# Patient Record
Sex: MALE | Race: WHITE | NOT HISPANIC OR LATINO | Employment: FULL TIME | URBAN - METROPOLITAN AREA
[De-identification: names, ages, dates, MRNs, and addresses within clinical notes are randomized per-mention and may not be internally consistent; named-entity substitution may affect disease eponyms.]

---

## 2017-01-17 ENCOUNTER — HOSPITAL ENCOUNTER (OUTPATIENT)
Dept: CARDIOLOGY | Facility: HOSPITAL | Age: 57
Discharge: HOME | End: 2017-01-17
Attending: INTERNAL MEDICINE | Admitting: INTERNAL MEDICINE

## 2017-05-16 ENCOUNTER — HOSPITAL ENCOUNTER (OUTPATIENT)
Dept: OTHER | Facility: HOSPITAL | Age: 57
Setting detail: SPECIMEN
Discharge: HOME | End: 2017-05-16
Attending: FAMILY MEDICINE | Admitting: FAMILY MEDICINE

## 2017-05-16 LAB
PSA SERPL-MCNC: 1.23 NG/ML (ref 0–4)
TESTOST SERPL-MCNC: 1.02 NG/ML (ref 1.7–7.8)

## 2018-06-06 ENCOUNTER — HOSPITAL ENCOUNTER (OUTPATIENT)
Dept: OTHER | Facility: HOSPITAL | Age: 58
Discharge: HOME | End: 2018-06-06
Attending: FAMILY MEDICINE | Admitting: FAMILY MEDICINE

## 2018-08-07 ENCOUNTER — HOSPITAL ENCOUNTER (OUTPATIENT)
Dept: OTHER | Facility: HOSPITAL | Age: 58
Setting detail: SPECIMEN
Discharge: HOME | End: 2018-08-07
Attending: FAMILY MEDICINE | Admitting: FAMILY MEDICINE

## 2018-08-07 LAB
ALBUMIN SERPL-MCNC: 4.1 G/DL (ref 3.5–4.8)
ALBUMIN/GLOB SERPL: 1.6 {RATIO} (ref 1–1.7)
ALP SERPL-CCNC: 53 IU/L (ref 32–91)
ALT SERPL-CCNC: 21 IU/L (ref 17–63)
ANION GAP SERPL CALC-SCNC: 11.1 MMOL/L (ref 10–20)
AST SERPL-CCNC: 24 IU/L (ref 15–41)
BASOPHILS # BLD AUTO: 0.1 10*3/UL (ref 0–0.2)
BASOPHILS NFR BLD AUTO: 1 % (ref 0–2)
BILIRUB SERPL-MCNC: 0.7 MG/DL (ref 0.3–1.2)
BUN SERPL-MCNC: 18 MG/DL (ref 8–20)
BUN/CREAT SERPL: 18 (ref 6.2–20.3)
CALCIUM SERPL-MCNC: 9.1 MG/DL (ref 8.9–10.3)
CHLORIDE SERPL-SCNC: 108 MMOL/L (ref 101–111)
CONV CO2: 25 MMOL/L (ref 22–32)
CONV TOTAL PROTEIN: 6.7 G/DL (ref 6.1–7.9)
CREAT UR-MCNC: 1 MG/DL (ref 0.7–1.2)
DIFFERENTIAL METHOD BLD: (no result)
EOSINOPHIL # BLD AUTO: 0.1 10*3/UL (ref 0–0.3)
EOSINOPHIL # BLD AUTO: 1 % (ref 0–3)
ERYTHROCYTE [DISTWIDTH] IN BLOOD BY AUTOMATED COUNT: 13.3 % (ref 11.5–14.5)
GLOBULIN UR ELPH-MCNC: 2.6 G/DL (ref 2.5–3.8)
GLUCOSE SERPL-MCNC: 89 MG/DL (ref 65–99)
HCT VFR BLD AUTO: 41.5 % (ref 40–54)
HGB BLD-MCNC: 14.4 G/DL (ref 14–18)
LYMPHOCYTES # BLD AUTO: 2 10*3/UL (ref 0.8–4.8)
LYMPHOCYTES NFR BLD AUTO: 35 % (ref 18–42)
MCH RBC QN AUTO: 30.8 PG (ref 26–32)
MCHC RBC AUTO-ENTMCNC: 34.6 G/DL (ref 32–36)
MCV RBC AUTO: 88.9 FL (ref 80–94)
MONOCYTES # BLD AUTO: 0.4 10*3/UL (ref 0.1–1.3)
MONOCYTES NFR BLD AUTO: 7 % (ref 2–11)
NEUTROPHILS # BLD AUTO: 3.2 10*3/UL (ref 2.3–8.6)
NEUTROPHILS NFR BLD AUTO: 56 % (ref 50–75)
NRBC BLD AUTO-RTO: 0 /100{WBCS}
NRBC/RBC NFR BLD MANUAL: 0 10*3/UL
PLATELET # BLD AUTO: 241 10*3/UL (ref 150–450)
PMV BLD AUTO: 8.4 FL (ref 7.4–10.4)
POTASSIUM SERPL-SCNC: 4.1 MMOL/L (ref 3.6–5.1)
RBC # BLD AUTO: 4.67 10*6/UL (ref 4.6–6)
SODIUM SERPL-SCNC: 140 MMOL/L (ref 136–144)
WBC # BLD AUTO: 5.7 10*3/UL (ref 4.5–11.5)

## 2018-08-31 ENCOUNTER — HOSPITAL ENCOUNTER (OUTPATIENT)
Dept: OTHER | Facility: HOSPITAL | Age: 58
Discharge: HOME | End: 2018-08-31
Attending: FAMILY MEDICINE | Admitting: FAMILY MEDICINE

## 2019-01-09 ENCOUNTER — HOSPITAL ENCOUNTER (OUTPATIENT)
Dept: OTHER | Facility: HOSPITAL | Age: 59
Setting detail: SPECIMEN
Discharge: HOME | End: 2019-01-09
Attending: FAMILY MEDICINE | Admitting: FAMILY MEDICINE

## 2019-01-09 ENCOUNTER — HOSPITAL ENCOUNTER (OUTPATIENT)
Dept: LAB | Facility: HOSPITAL | Age: 59
Discharge: HOME | End: 2019-01-09
Attending: FAMILY MEDICINE | Admitting: FAMILY MEDICINE

## 2019-01-09 LAB
ALBUMIN SERPL-MCNC: 3.9 G/DL (ref 3.5–4.8)
ALBUMIN/GLOB SERPL: 1.6 {RATIO} (ref 1–1.7)
ALP SERPL-CCNC: 63 IU/L (ref 32–91)
ALT SERPL-CCNC: 23 IU/L (ref 17–63)
ANION GAP SERPL CALC-SCNC: 13.9 MMOL/L (ref 10–20)
AST SERPL-CCNC: 28 IU/L (ref 15–41)
BILIRUB SERPL-MCNC: 0.3 MG/DL (ref 0.3–1.2)
BUN SERPL-MCNC: 14 MG/DL (ref 8–20)
BUN/CREAT SERPL: 14 (ref 6.2–20.3)
CALCIUM SERPL-MCNC: 9 MG/DL (ref 8.9–10.3)
CHLORIDE SERPL-SCNC: 102 MMOL/L (ref 101–111)
CONV CO2: 23 MMOL/L (ref 22–32)
CONV TOTAL PROTEIN: 6.4 G/DL (ref 6.1–7.9)
CREAT UR-MCNC: 1 MG/DL (ref 0.7–1.2)
GLOBULIN UR ELPH-MCNC: 2.5 G/DL (ref 2.5–3.8)
GLUCOSE SERPL-MCNC: 121 MG/DL (ref 65–99)
POTASSIUM SERPL-SCNC: 3.9 MMOL/L (ref 3.6–5.1)
SODIUM SERPL-SCNC: 135 MMOL/L (ref 136–144)
URATE SERPL-MCNC: 6.9 MG/DL (ref 4.8–8.7)

## 2019-01-11 LAB
ANA SER QL IA: NORMAL
CHROMATIN AB SERPL-ACNC: <20 [IU]/ML (ref 0–20)

## 2019-01-15 ENCOUNTER — HOSPITAL ENCOUNTER (OUTPATIENT)
Dept: LAB | Facility: HOSPITAL | Age: 59
Discharge: HOME | End: 2019-01-15
Attending: NURSE PRACTITIONER | Admitting: NURSE PRACTITIONER

## 2019-01-15 LAB
Lab: 24
Lab: NORMAL
Lab: NORMAL
SPECIMEN SOURCE: NORMAL
URINE VOLUME: 1300 ML

## 2019-02-18 ENCOUNTER — HOSPITAL ENCOUNTER (OUTPATIENT)
Dept: OTHER | Facility: HOSPITAL | Age: 59
Setting detail: SPECIMEN
Discharge: HOME | End: 2019-02-18
Attending: FAMILY MEDICINE | Admitting: FAMILY MEDICINE

## 2019-07-03 ENCOUNTER — OFFICE VISIT (OUTPATIENT)
Dept: FAMILY MEDICINE CLINIC | Facility: CLINIC | Age: 59
End: 2019-07-03

## 2019-07-03 VITALS
HEIGHT: 72 IN | BODY MASS INDEX: 26.33 KG/M2 | HEART RATE: 67 BPM | OXYGEN SATURATION: 97 % | SYSTOLIC BLOOD PRESSURE: 160 MMHG | DIASTOLIC BLOOD PRESSURE: 88 MMHG | WEIGHT: 194.4 LBS

## 2019-07-03 DIAGNOSIS — L82.1 SEBORRHEIC KERATOSIS: Primary | ICD-10-CM

## 2019-07-03 DIAGNOSIS — L82.1 SEBORRHEIC KERATOSES: ICD-10-CM

## 2019-07-03 PROCEDURE — 11200 RMVL SKIN TAGS UP TO&INC 15: CPT | Performed by: FAMILY MEDICINE

## 2019-07-03 PROCEDURE — 99211 OFF/OP EST MAY X REQ PHY/QHP: CPT | Performed by: FAMILY MEDICINE

## 2019-07-03 RX ORDER — TESTOSTERONE CYPIONATE 200 MG/ML
INJECTION, SOLUTION INTRAMUSCULAR
COMMUNITY
Start: 2016-12-27 | End: 2019-08-08 | Stop reason: SDUPTHER

## 2019-07-03 RX ORDER — VERAPAMIL HYDROCHLORIDE 240 MG/1
CAPSULE, EXTENDED RELEASE ORAL
COMMUNITY
Start: 2018-04-30 | End: 2019-08-08 | Stop reason: SDUPTHER

## 2019-07-03 RX ORDER — LISINOPRIL 20 MG/1
TABLET ORAL EVERY 12 HOURS
COMMUNITY
Start: 2018-03-27 | End: 2019-08-08 | Stop reason: SDUPTHER

## 2019-07-04 PROBLEM — E03.9 HYPOTHYROIDISM: Status: ACTIVE | Noted: 2018-08-31

## 2019-07-04 PROBLEM — F41.9 CHRONIC ANXIETY: Status: ACTIVE | Noted: 2019-04-22

## 2019-07-04 PROBLEM — M25.50 PAIN IN JOINT: Status: ACTIVE | Noted: 2019-01-09

## 2019-07-04 PROBLEM — L82.1 SEBORRHEIC KERATOSIS: Status: ACTIVE | Noted: 2017-01-13

## 2019-07-04 PROBLEM — M19.90 DEGENERATIVE ARTHRITIS: Status: ACTIVE | Noted: 2019-01-09

## 2019-07-04 PROBLEM — R42 DIZZINESS: Status: ACTIVE | Noted: 2017-11-22

## 2019-07-04 PROBLEM — H10.10 ALLERGIC CONJUNCTIVITIS: Status: ACTIVE | Noted: 2017-02-14

## 2019-07-04 PROBLEM — R79.89 LOW TESTOSTERONE: Status: ACTIVE | Noted: 2019-05-06

## 2019-07-04 PROBLEM — M31.6 TEMPORAL ARTERITIS (HCC): Status: ACTIVE | Noted: 2019-03-26

## 2019-07-04 PROBLEM — R51.9 OCCIPITAL HEADACHE: Status: ACTIVE | Noted: 2017-11-22

## 2019-07-04 NOTE — PROGRESS NOTES
Procedure  Pt experiencing irritation and inflammation from skin lesions and requests removal.      Destruction of Lesion  Date/Time: 7/4/2019 8:53 AM  Performed by: Armen Hayward Jr., MD  Authorized by: Armen Hayward Jr., MD   Local anesthesia used: yes    Anesthesia:  Local anesthesia used: yes  Local Anesthetic: lidocaine 1% with epinephrine    Sedation:  Patient sedated: no    Patient tolerance: Patient tolerated the procedure well with no immediate complications  Comments: 4 separate Lesions electrocauterized and removed w sailaja. No Path indicated.  Wound care instructions. F/U prn.

## 2019-08-05 ENCOUNTER — TELEPHONE (OUTPATIENT)
Dept: FAMILY MEDICINE CLINIC | Facility: CLINIC | Age: 59
End: 2019-08-05

## 2019-08-05 NOTE — TELEPHONE ENCOUNTER
I don't believe there are any multidose vials available.  Please call his Pharmacy to Ck.  We could also give his Injections q 2 weeks instead of monthly to see if that would help. Thanks.

## 2019-08-05 NOTE — TELEPHONE ENCOUNTER
Patient came in today for his testosterone injection and said that his energy levels haven't improved much. He said they are OK a few days after the injection, then they begin to decline again. He used his last vial today and will need a refill. He requested that you prescribe him a multi-dose vial instead of single-dose.

## 2019-08-08 ENCOUNTER — OFFICE VISIT (OUTPATIENT)
Dept: FAMILY MEDICINE CLINIC | Facility: CLINIC | Age: 59
End: 2019-08-08

## 2019-08-08 VITALS
HEART RATE: 70 BPM | HEIGHT: 72 IN | OXYGEN SATURATION: 98 % | SYSTOLIC BLOOD PRESSURE: 142 MMHG | WEIGHT: 190.6 LBS | DIASTOLIC BLOOD PRESSURE: 81 MMHG | BODY MASS INDEX: 25.81 KG/M2

## 2019-08-08 DIAGNOSIS — L57.0 MULTIPLE ACTINIC KERATOSES: ICD-10-CM

## 2019-08-08 DIAGNOSIS — I10 ESSENTIAL HYPERTENSION: ICD-10-CM

## 2019-08-08 DIAGNOSIS — Z86.73 HISTORY OF TRANSIENT ISCHEMIC ATTACK: ICD-10-CM

## 2019-08-08 DIAGNOSIS — M31.6 TEMPORAL ARTERITIS (HCC): ICD-10-CM

## 2019-08-08 DIAGNOSIS — L98.9 SKIN LESIONS: Primary | ICD-10-CM

## 2019-08-08 PROCEDURE — 99213 OFFICE O/P EST LOW 20 MIN: CPT | Performed by: FAMILY MEDICINE

## 2019-08-08 RX ORDER — TESTOSTERONE CYPIONATE 200 MG/ML
200 INJECTION, SOLUTION INTRAMUSCULAR
Qty: 6 ML | Refills: 1 | Status: SHIPPED | OUTPATIENT
Start: 2019-08-08 | End: 2019-12-02 | Stop reason: SDUPTHER

## 2019-08-08 RX ORDER — VERAPAMIL HYDROCHLORIDE 240 MG/1
240 CAPSULE, EXTENDED RELEASE ORAL NIGHTLY
Qty: 90 CAPSULE | Refills: 1 | Status: SHIPPED | OUTPATIENT
Start: 2019-08-08 | End: 2020-02-10 | Stop reason: SDUPTHER

## 2019-08-08 RX ORDER — LISINOPRIL 20 MG/1
20 TABLET ORAL EVERY 12 HOURS
Qty: 90 TABLET | Refills: 1 | Status: SHIPPED | OUTPATIENT
Start: 2019-08-08 | End: 2020-02-10 | Stop reason: SDUPTHER

## 2019-08-08 RX ORDER — GABAPENTIN 100 MG/1
100 CAPSULE ORAL 2 TIMES DAILY
Qty: 120 CAPSULE | Refills: 5 | Status: SHIPPED | OUTPATIENT
Start: 2019-08-08 | End: 2020-03-10 | Stop reason: SDUPTHER

## 2019-08-08 RX ORDER — VERAPAMIL HYDROCHLORIDE 240 MG/1
240 CAPSULE, EXTENDED RELEASE ORAL DAILY
Qty: 30 CAPSULE | Refills: 5 | Status: CANCELLED | OUTPATIENT
Start: 2019-08-08

## 2019-08-08 NOTE — PROGRESS NOTES
"Subjective   Jaron Espinoza is a 58 y.o. male.     Pt in for F/U chronic Fatigue and chronic Neuropathy.  Prednisone seemed to help \"for a while\" but back to baseline.  Also has some irritated skin lesions on L shoulder, chest, R arm.     /81   Pulse 70   Ht 182.9 cm (72.01\")   Wt 86.5 kg (190 lb 9.6 oz)   SpO2 98%   BMI 25.84 kg/m²     The following portions of the patient's history were reviewed and updated as appropriate: allergies, current medications, past family history, past medical history, past social history, past surgical history and problem list.    Review of Systems   Constitutional: Positive for fatigue.   Eyes: Negative.    Respiratory: Negative.    Cardiovascular: Negative.    Neurological: Positive for dizziness, weakness, light-headedness, numbness and headaches.   Hematological: Negative.    Psychiatric/Behavioral: Positive for dysphoric mood and sleep disturbance. Negative for self-injury and suicidal ideas. The patient is nervous/anxious.        Objective   Physical Exam   Constitutional: He is oriented to person, place, and time. He appears well-developed and well-nourished. No distress.   HENT:   Head: Normocephalic and atraumatic.   Mouth/Throat: Oropharynx is clear and moist.   Eyes: Conjunctivae and EOM are normal. Pupils are equal, round, and reactive to light.   Neck: Normal range of motion. Neck supple. No thyromegaly present.   Cardiovascular: Normal rate, normal heart sounds and intact distal pulses. Exam reveals no gallop.   No murmur heard.  Pulmonary/Chest: Effort normal and breath sounds normal. No respiratory distress. He has no wheezes. He has no rales.   Abdominal: Soft. Bowel sounds are normal. He exhibits no distension and no mass. There is no tenderness. No hernia.   Musculoskeletal: Normal range of motion. He exhibits tenderness. He exhibits no deformity.   Lymphadenopathy:     He has no cervical adenopathy.   Neurological: He is alert and oriented to person, " place, and time. He displays normal reflexes. A sensory deficit is present. No cranial nerve deficit. He exhibits normal muscle tone. Coordination normal.   Skin: Skin is warm and dry.   Scattered actinic and seborrheic keratoses. One on L Shoulder, L Abdomen, R Triceps area appear irritated and are removed w electrocautery and currettage.   Psychiatric: His behavior is normal. Judgment and thought content normal.   Mildly depressed and mod anxious. No harm thoughts.   Nursing note and vitals reviewed.      Assessment/Plan   Jaron was seen today for suspicious skin lesion.    Diagnoses and all orders for this visit:    Skin lesions    Multiple actinic keratoses    Essential hypertension    Temporal arteritis (CMS/HCC)    History of transient ischemic attack    Other orders  -     Testosterone Cypionate (DEPO-TESTOSTERONE) 200 MG/ML injection; Inject 1 mL into the appropriate muscle as directed by prescriber Every 14 (Fourteen) Days.  -     gabapentin (NEURONTIN) 100 MG capsule; Take 1 capsule by mouth 2 (Two) Times a Day. And 2-3 caps at bedtime.  -     verapamil (VERELAN) 240 MG 24 hr capsule; Take 1 capsule by mouth Every Night.  -     lisinopril (PRINIVIL,ZESTRIL) 20 MG tablet; Take 1 tablet by mouth Every 12 (Twelve) Hours.

## 2019-08-10 NOTE — PATIENT INSTRUCTIONS
Lesions removed as noted.  Labs/Imaging/Meds reviewed w no changes at this time. Neuro/Rheum Evaluation pending.  PCP F/U prn or in 3 mo.

## 2019-08-10 NOTE — PROGRESS NOTES
Procedure   Destruction of Lesion  Date/Time: 8/10/2019 9:13 AM  Performed by: Armen Hayward Jr., MD  Authorized by: Armen Hayward Jr., MD   Local anesthesia used: yes  Anesthesia: local infiltration    Anesthesia:  Local anesthesia used: yes  Local Anesthetic: lidocaine 1% without epinephrine  Anesthetic total: 0.5 mL    Sedation:  Patient sedated: no    Patient tolerance: Patient tolerated the procedure well with no immediate complications  Comments: Alcohol Prep of Lesions on L shoulder, L Abdomen, R Triceps, all < 1 cm, Numbed w 1% xylocaine and electrocauterized, curretted, and covered w Neosporina nd Band-aids. No Pathology indicated or sent. Wound care Instructions given.

## 2019-08-12 NOTE — TELEPHONE ENCOUNTER
Pharmacy does carry multi-dose vials, but they only have one left. Do you want to order it or continue with single dose? Thanks.

## 2019-08-12 NOTE — TELEPHONE ENCOUNTER
Ordered multi-dose (10ml) with one refill with the same directions as the previous prescription. Thanks.

## 2019-10-25 ENCOUNTER — TELEPHONE (OUTPATIENT)
Dept: FAMILY MEDICINE CLINIC | Facility: CLINIC | Age: 59
End: 2019-10-25

## 2019-10-25 DIAGNOSIS — R79.89 LOW TESTOSTERONE: Primary | ICD-10-CM

## 2019-10-25 DIAGNOSIS — E03.9 ACQUIRED HYPOTHYROIDISM: ICD-10-CM

## 2019-10-25 DIAGNOSIS — I10 ESSENTIAL HYPERTENSION: ICD-10-CM

## 2019-10-25 DIAGNOSIS — E78.49 OTHER HYPERLIPIDEMIA: ICD-10-CM

## 2019-12-02 RX ORDER — TESTOSTERONE CYPIONATE 200 MG/ML
200 INJECTION, SOLUTION INTRAMUSCULAR
Qty: 6 ML | Refills: 1 | Status: SHIPPED | OUTPATIENT
Start: 2019-12-02 | End: 2019-12-11 | Stop reason: SDUPTHER

## 2019-12-11 DIAGNOSIS — R79.89 LOW TESTOSTERONE: Primary | ICD-10-CM

## 2019-12-11 RX ORDER — TESTOSTERONE CYPIONATE 200 MG/ML
200 INJECTION, SOLUTION INTRAMUSCULAR
Qty: 6 ML | Refills: 1 | Status: SHIPPED | OUTPATIENT
Start: 2019-12-11 | End: 2020-03-10 | Stop reason: SDUPTHER

## 2019-12-20 ENCOUNTER — TRANSCRIBE ORDERS (OUTPATIENT)
Dept: ADMINISTRATIVE | Facility: HOSPITAL | Age: 59
End: 2019-12-20

## 2019-12-20 DIAGNOSIS — G89.29 CHRONIC NONINTRACTABLE HEADACHE, UNSPECIFIED HEADACHE TYPE: Primary | ICD-10-CM

## 2019-12-20 DIAGNOSIS — R51.9 CHRONIC NONINTRACTABLE HEADACHE, UNSPECIFIED HEADACHE TYPE: Primary | ICD-10-CM

## 2019-12-27 ENCOUNTER — HOSPITAL ENCOUNTER (OUTPATIENT)
Dept: MRI IMAGING | Facility: HOSPITAL | Age: 59
Discharge: HOME | End: 2019-12-27
Admitting: PSYCHIATRY & NEUROLOGY

## 2019-12-27 DIAGNOSIS — G89.29 CHRONIC NONINTRACTABLE HEADACHE, UNSPECIFIED HEADACHE TYPE: ICD-10-CM

## 2019-12-27 DIAGNOSIS — R51.9 CHRONIC NONINTRACTABLE HEADACHE, UNSPECIFIED HEADACHE TYPE: ICD-10-CM

## 2019-12-27 PROCEDURE — 70551 MRI BRAIN STEM W/O DYE: CPT

## 2020-01-17 PROCEDURE — 87086 URINE CULTURE/COLONY COUNT: CPT

## 2020-02-10 RX ORDER — VERAPAMIL HYDROCHLORIDE 240 MG/1
240 CAPSULE, EXTENDED RELEASE ORAL NIGHTLY
Qty: 90 CAPSULE | Refills: 1 | Status: SHIPPED | OUTPATIENT
Start: 2020-02-10 | End: 2020-08-10

## 2020-02-10 RX ORDER — LISINOPRIL 20 MG/1
20 TABLET ORAL EVERY 12 HOURS
Qty: 90 TABLET | Refills: 0 | Status: SHIPPED | OUTPATIENT
Start: 2020-02-10 | End: 2020-03-10 | Stop reason: SDUPTHER

## 2020-02-17 ENCOUNTER — TELEPHONE (OUTPATIENT)
Dept: FAMILY MEDICINE CLINIC | Facility: CLINIC | Age: 60
End: 2020-02-17

## 2020-02-17 NOTE — TELEPHONE ENCOUNTER
Called patient to remind him of his standing lab orders with no answer.  Left vm for patient to return call.

## 2020-02-19 ENCOUNTER — LAB (OUTPATIENT)
Dept: FAMILY MEDICINE CLINIC | Facility: CLINIC | Age: 60
End: 2020-02-19

## 2020-02-19 DIAGNOSIS — I10 ESSENTIAL HYPERTENSION: ICD-10-CM

## 2020-02-19 DIAGNOSIS — E03.9 ACQUIRED HYPOTHYROIDISM: ICD-10-CM

## 2020-02-19 DIAGNOSIS — R79.89 LOW TESTOSTERONE: ICD-10-CM

## 2020-02-19 DIAGNOSIS — E78.49 OTHER HYPERLIPIDEMIA: ICD-10-CM

## 2020-02-19 PROCEDURE — 84403 ASSAY OF TOTAL TESTOSTERONE: CPT | Performed by: NURSE PRACTITIONER

## 2020-02-19 PROCEDURE — 85027 COMPLETE CBC AUTOMATED: CPT | Performed by: NURSE PRACTITIONER

## 2020-02-19 PROCEDURE — 80061 LIPID PANEL: CPT | Performed by: NURSE PRACTITIONER

## 2020-02-19 PROCEDURE — 84443 ASSAY THYROID STIM HORMONE: CPT | Performed by: NURSE PRACTITIONER

## 2020-02-19 PROCEDURE — 80053 COMPREHEN METABOLIC PANEL: CPT | Performed by: NURSE PRACTITIONER

## 2020-02-20 LAB
ALBUMIN SERPL-MCNC: 4.5 G/DL (ref 3.5–5.2)
ALBUMIN/GLOB SERPL: 1.8 G/DL
ALP SERPL-CCNC: 52 U/L (ref 39–117)
ALT SERPL W P-5'-P-CCNC: 21 U/L (ref 1–41)
ANION GAP SERPL CALCULATED.3IONS-SCNC: 16.6 MMOL/L (ref 5–15)
AST SERPL-CCNC: 24 U/L (ref 1–40)
BILIRUB SERPL-MCNC: 0.4 MG/DL (ref 0.2–1.2)
BUN BLD-MCNC: 24 MG/DL (ref 8–23)
BUN/CREAT SERPL: 23.3 (ref 7–25)
CALCIUM SPEC-SCNC: 9.3 MG/DL (ref 8.6–10.5)
CHLORIDE SERPL-SCNC: 103 MMOL/L (ref 98–107)
CHOLEST SERPL-MCNC: 234 MG/DL (ref 0–200)
CO2 SERPL-SCNC: 19.4 MMOL/L (ref 22–29)
CREAT BLD-MCNC: 1.03 MG/DL (ref 0.76–1.27)
DEPRECATED RDW RBC AUTO: 41 FL (ref 37–54)
ERYTHROCYTE [DISTWIDTH] IN BLOOD BY AUTOMATED COUNT: 12.9 % (ref 12.3–15.4)
GFR SERPL CREATININE-BSD FRML MDRD: 73 ML/MIN/1.73
GLOBULIN UR ELPH-MCNC: 2.5 GM/DL
GLUCOSE BLD-MCNC: 98 MG/DL (ref 65–99)
HCT VFR BLD AUTO: 46.8 % (ref 37.5–51)
HDLC SERPL-MCNC: 37 MG/DL (ref 40–60)
HGB BLD-MCNC: 15.7 G/DL (ref 13–17.7)
LDLC SERPL CALC-MCNC: 144 MG/DL (ref 0–100)
LDLC/HDLC SERPL: 3.9 {RATIO}
MCH RBC QN AUTO: 29.5 PG (ref 26.6–33)
MCHC RBC AUTO-ENTMCNC: 33.5 G/DL (ref 31.5–35.7)
MCV RBC AUTO: 87.8 FL (ref 79–97)
PLATELET # BLD AUTO: 255 10*3/MM3 (ref 140–450)
PMV BLD AUTO: 10.5 FL (ref 6–12)
POTASSIUM BLD-SCNC: 4.2 MMOL/L (ref 3.5–5.2)
PROT SERPL-MCNC: 7 G/DL (ref 6–8.5)
RBC # BLD AUTO: 5.33 10*6/MM3 (ref 4.14–5.8)
SODIUM BLD-SCNC: 139 MMOL/L (ref 136–145)
TESTOST SERPL-MCNC: 91.9 NG/DL (ref 193–740)
TRIGL SERPL-MCNC: 264 MG/DL (ref 0–150)
TSH SERPL DL<=0.05 MIU/L-ACNC: 4.1 UIU/ML (ref 0.27–4.2)
VLDLC SERPL-MCNC: 52.8 MG/DL (ref 5–40)
WBC NRBC COR # BLD: 6.36 10*3/MM3 (ref 3.4–10.8)

## 2020-03-04 ENCOUNTER — TELEPHONE (OUTPATIENT)
Dept: FAMILY MEDICINE CLINIC | Facility: CLINIC | Age: 60
End: 2020-03-04

## 2020-03-04 NOTE — TELEPHONE ENCOUNTER
Patient presented to the office today for a testosterone injection and told me that he is having the following symptoms:  Bleeding and discharge from navel, no bowel changes, nausea and loss of appetite.  He believes that it is due to the use of [employer] deep alvarez dry oil spray that he had been using for a vacation.  He said that he could taste the spray.  I asked him if he had used the spray on his face because he would need to call poison control if he had.  He denies using it on his face or getting it in his mouth.  He said that he can squeeze his skin and the self tanning spray will leak out.  He said the symptoms began after he returned from [place] in late Jan early Feb.  He sees a neurologist and has expressed to me in past visits that he has been trying to ween off of some of his medications.  I told him that it is unwise to do unless he is being supervised by a provider.  He wanted me to ask if you could order labs to check if the self colby is in his bloodstream.  He has an appt with you on 3/10.

## 2020-03-04 NOTE — TELEPHONE ENCOUNTER
There is not a test to check to see if the sunscreen is in his bloodstream… did have blood work done in February and we will discuss this at his follow-up visit next week.  The bleeding and discharge from the navel may be hernia or something else, if this continues he may need to go to the emergency department.

## 2020-03-04 NOTE — TELEPHONE ENCOUNTER
Patient reports that he spoke with his neurologist and they told him that some of his symptoms are related to the topiramate that he is taking.  He is going to ween off under neurologist's supervision.

## 2020-03-10 ENCOUNTER — OFFICE VISIT (OUTPATIENT)
Dept: FAMILY MEDICINE CLINIC | Facility: CLINIC | Age: 60
End: 2020-03-10

## 2020-03-10 VITALS
HEIGHT: 68 IN | WEIGHT: 193.4 LBS | SYSTOLIC BLOOD PRESSURE: 143 MMHG | OXYGEN SATURATION: 97 % | DIASTOLIC BLOOD PRESSURE: 77 MMHG | HEART RATE: 74 BPM | BODY MASS INDEX: 29.31 KG/M2

## 2020-03-10 DIAGNOSIS — R79.89 LOW TESTOSTERONE: ICD-10-CM

## 2020-03-10 DIAGNOSIS — E78.49 OTHER HYPERLIPIDEMIA: ICD-10-CM

## 2020-03-10 DIAGNOSIS — Z12.11 COLON CANCER SCREENING: ICD-10-CM

## 2020-03-10 DIAGNOSIS — R20.9 COLD LEFT FOOT: ICD-10-CM

## 2020-03-10 DIAGNOSIS — I10 ESSENTIAL HYPERTENSION: Primary | ICD-10-CM

## 2020-03-10 PROCEDURE — 99214 OFFICE O/P EST MOD 30 MIN: CPT | Performed by: NURSE PRACTITIONER

## 2020-03-10 RX ORDER — LISINOPRIL 20 MG/1
20 TABLET ORAL DAILY
Qty: 90 TABLET | Refills: 1 | Status: SHIPPED | OUTPATIENT
Start: 2020-03-10 | End: 2020-12-17 | Stop reason: SDUPTHER

## 2020-03-10 RX ORDER — TESTOSTERONE CYPIONATE 200 MG/ML
200 INJECTION, SOLUTION INTRAMUSCULAR
Qty: 10 ML | Refills: 1 | Status: SHIPPED | OUTPATIENT
Start: 2020-03-10 | End: 2020-06-11

## 2020-03-10 RX ORDER — CARBAMAZEPINE 100 MG/1
100 TABLET, CHEWABLE ORAL DAILY
COMMUNITY
Start: 2020-03-06 | End: 2020-06-10

## 2020-03-10 RX ORDER — LISINOPRIL 20 MG/1
20 TABLET ORAL DAILY
COMMUNITY
End: 2020-03-10 | Stop reason: SDUPTHER

## 2020-03-10 RX ORDER — TESTOSTERONE CYPIONATE 200 MG/ML
200 INJECTION, SOLUTION INTRAMUSCULAR
COMMUNITY
Start: 2019-12-02 | End: 2020-03-10 | Stop reason: SDUPTHER

## 2020-03-10 NOTE — PROGRESS NOTES
Chief Complaint   Patient presents with   • Establish Care     Former Dr. Hayward patient       HPI     HTN, stable on meds and takes as directed, denies chest pain, headache, shortness of air, palpitations and swelling of extremities.  He does report his left lower extremity feels cool with intermittent pain in the posterior part of the leg intermittently, the pain resolves once he lies down and elevates his legs.    Migraines, see's Dr. Michael guerra with [facility], has stopped amitrip and gabapentin. Feels like the pain is r/t neck muscle spasms. Carbamazepine recently started and will only be used as needed for migraines.     Bleeding from [employer] recently, denies injury and abdominal pain.  Now just a scab present would like evaluated.         Past Medical History:   Diagnosis Date   • Headache    • Hx of hypogonadism    • Hyperlipidemia    • Hypertension      Past Surgical History:   Procedure Laterality Date   • OTHER SURGICAL HISTORY  2012    TIA- [place]     Family History   Problem Relation Age of Onset   • Cancer Mother      Social History     Tobacco Use   • Smoking status: Former Smoker   • Smokeless tobacco: Never Used   • Tobacco comment: quit x30 years   Substance Use Topics   • Alcohol use: No     Frequency: Never         Current Outpatient Medications:   •  carBAMazepine (TEGretol) 100 MG chewable tablet, Chew 100 mg Daily., Disp: , Rfl:   •  lisinopril (PRINIVIL,ZESTRIL) 20 MG tablet, Take 1 tablet by mouth Daily., Disp: 90 tablet, Rfl: 1  •  Testosterone Cypionate (DEPO-TESTOSTERONE) 200 MG/ML injection, Inject 1 mL into the appropriate muscle as directed by prescriber Every 14 (Fourteen) Days., Disp: 10 mL, Rfl: 1  •  verapamil ER (VERELAN) 240 MG 24 hr capsule, Take 1 capsule by mouth Every Night., Disp: 90 capsule, Rfl: 1        The following portions of the patient's history were reviewed and updated as appropriate: allergies, current medications, past family history, past medical history, past  "social history, past surgical history and problem list.    Review of Systems   Constitutional: Negative for chills, fatigue and fever.   HENT: Negative for dental problem, ear pain, sinus pressure and sore throat.    Eyes: Negative for visual disturbance.   Respiratory: Negative for cough, shortness of breath and wheezing.    Cardiovascular: Negative for chest pain, palpitations and leg swelling.   Gastrointestinal: Positive for nausea (resolved after stopping migraine meds). Negative for abdominal pain, blood in stool, constipation, diarrhea, vomiting and GERD.   Genitourinary: Negative for difficulty urinating, frequency, urgency and urinary incontinence.   Musculoskeletal: Negative for arthralgias, back pain, gait problem, joint swelling, myalgias and neck pain.        Left leg pain and the foot is cool intermittently   Skin: Negative for dry skin, pallor and rash.        Umbilical sore, bleeding at times   Neurological: Positive for headache. Negative for dizziness, seizures, speech difficulty and weakness.   Hematological: Negative for adenopathy.   Psychiatric/Behavioral: Negative for sleep disturbance, depressed mood and stress. The patient is not nervous/anxious.         Vitals:    03/10/20 1011   BP: 143/77   BP Location: Left arm   Patient Position: Sitting   Cuff Size: Large Adult   Pulse: 74   SpO2: 97%   Weight: 87.7 kg (193 lb 6.4 oz)   Height: 172.7 cm (67.99\")     Body mass index is 29.41 kg/m².     Physical Exam   Constitutional: He is oriented to person, place, and time. He appears well-developed and well-nourished. No distress.   HENT:   Head: Normocephalic and atraumatic.   Eyes: Pupils are equal, round, and reactive to light.   Neck: Normal range of motion. No thyromegaly present.   Cardiovascular: Normal rate, regular rhythm, normal heart sounds and intact distal pulses.   Cool left foot, pedal pulses present, slightly diminished DP +2, PT +2.  Right foot warm/pedal pulses normal "   Pulmonary/Chest: Effort normal and breath sounds normal. No respiratory distress.   Abdominal: Soft. Bowel sounds are normal. He exhibits no distension. There is no tenderness.   Musculoskeletal: Normal range of motion.   Neurological: He is alert and oriented to person, place, and time. No sensory deficit.   Skin: Skin is warm and dry. He is not diaphoretic. No erythema.   Tiny umbilical scab/excoriation present, healing, no drainage, tenderness or erythema   Psychiatric: He has a normal mood and affect. His behavior is normal. Judgment and thought content normal.   Nursing note and vitals reviewed.       No visits with results within 7 Day(s) from this visit.   Latest known visit with results is:   Lab on 02/19/2020   Component Date Value Ref Range Status   • Testosterone, Total 02/19/2020 91.90* 193.00 - 740.00 ng/dL Final   • Glucose 02/19/2020 98  65 - 99 mg/dL Final   • BUN 02/19/2020 24* 8 - 23 mg/dL Final   • Creatinine 02/19/2020 1.03  0.76 - 1.27 mg/dL Final   • Sodium 02/19/2020 139  136 - 145 mmol/L Final   • Potassium 02/19/2020 4.2  3.5 - 5.2 mmol/L Final   • Chloride 02/19/2020 103  98 - 107 mmol/L Final   • CO2 02/19/2020 19.4* 22.0 - 29.0 mmol/L Final   • Calcium 02/19/2020 9.3  8.6 - 10.5 mg/dL Final   • Total Protein 02/19/2020 7.0  6.0 - 8.5 g/dL Final   • Albumin 02/19/2020 4.50  3.50 - 5.20 g/dL Final   • ALT (SGPT) 02/19/2020 21  1 - 41 U/L Final   • AST (SGOT) 02/19/2020 24  1 - 40 U/L Final   • Alkaline Phosphatase 02/19/2020 52  39 - 117 U/L Final   • Total Bilirubin 02/19/2020 0.4  0.2 - 1.2 mg/dL Final   • eGFR Non African Amer 02/19/2020 73  >60 mL/min/1.73 Final   • Globulin 02/19/2020 2.5  gm/dL Final   • A/G Ratio 02/19/2020 1.8  g/dL Final   • BUN/Creatinine Ratio 02/19/2020 23.3  7.0 - 25.0 Final   • Anion Gap 02/19/2020 16.6* 5.0 - 15.0 mmol/L Final   • TSH 02/19/2020 4.100  0.270 - 4.200 uIU/mL Final   • Total Cholesterol 02/19/2020 234* 0 - 200 mg/dL Final   • Triglycerides  02/19/2020 264* 0 - 150 mg/dL Final   • HDL Cholesterol 02/19/2020 37* 40 - 60 mg/dL Final   • LDL Cholesterol  02/19/2020 144* 0 - 100 mg/dL Final   • VLDL Cholesterol 02/19/2020 52.8* 5 - 40 mg/dL Final   • LDL/HDL Ratio 02/19/2020 3.90   Final   • WBC 02/19/2020 6.36  3.40 - 10.80 10*3/mm3 Final   • RBC 02/19/2020 5.33  4.14 - 5.80 10*6/mm3 Final   • Hemoglobin 02/19/2020 15.7  13.0 - 17.7 g/dL Final   • Hematocrit 02/19/2020 46.8  37.5 - 51.0 % Final   • MCV 02/19/2020 87.8  79.0 - 97.0 fL Final   • MCH 02/19/2020 29.5  26.6 - 33.0 pg Final   • MCHC 02/19/2020 33.5  31.5 - 35.7 g/dL Final   • RDW 02/19/2020 12.9  12.3 - 15.4 % Final   • RDW-SD 02/19/2020 41.0  37.0 - 54.0 fl Final   • MPV 02/19/2020 10.5  6.0 - 12.0 fL Final   • Platelets 02/19/2020 255  140 - 450 10*3/mm3 Final       Diagnoses and all orders for this visit:    1. Essential hypertension (Primary)    2. Other hyperlipidemia    3. Cold left foot  -     Doppler Ankle Brachial Index Single Level CAR; Future    4. Low testosterone  -     Testosterone Cypionate (DEPO-TESTOSTERONE) 200 MG/ML injection; Inject 1 mL into the appropriate muscle as directed by prescriber Every 14 (Fourteen) Days.  Dispense: 10 mL; Refill: 1    5. Colon cancer screening  -     Cologuard - Stool, Per Rectum; Future    Other orders  -     lisinopril (PRINIVIL,ZESTRIL) 20 MG tablet; Take 1 tablet by mouth Daily.  Dispense: 90 tablet; Refill: 1      Reviewed labs, recommended to patient to get fishoil OTC. Given HHD information, discussed lifestyle modifications and improving exercise habits.  Plan to repeat lipids again in 3 months.  We will need to consider statin if no improvement  continue testosterone 200 mg injections every 14 days, level may improve now off amitrip and migraine meds.   bp elev, rf lisinopril, check BP outside of the office and notify if consistently greater than 150  Check TITA due to complaint of left cool foot and occasional posterior leg pain  Repeat  hypertension panel 1 week prior to 3-month follow-up, will discuss labs at follow-up visit

## 2020-03-10 NOTE — PATIENT INSTRUCTIONS
"Get fishoil OTC for hypertriglycerides.       DASH Eating Plan  DASH stands for \"Dietary Approaches to Stop Hypertension.\" The DASH eating plan is a healthy eating plan that has been shown to reduce high blood pressure (hypertension). It may also reduce your risk for type 2 diabetes, heart disease, and stroke. The DASH eating plan may also help with weight loss.  What are tips for following this plan?    General guidelines  · Avoid eating more than 2,300 mg (milligrams) of salt (sodium) a day. If you have hypertension, you may need to reduce your sodium intake to 1,500 mg a day.  · Limit alcohol intake to no more than 1 drink a day for nonpregnant women and 2 drinks a day for men. One drink equals 12 oz of beer, 5 oz of wine, or 1½ oz of hard liquor.  · Work with your health care provider to maintain a healthy body weight or to lose weight. Ask what an ideal weight is for you.  · Get at least 30 minutes of exercise that causes your heart to beat faster (aerobic exercise) most days of the week. Activities may include walking, swimming, or biking.  · Work with your health care provider or diet and nutrition specialist (dietitian) to adjust your eating plan to your individual calorie needs.  Reading food labels    · Check food labels for the amount of sodium per serving. Choose foods with less than 5 percent of the Daily Value of sodium. Generally, foods with less than 300 mg of sodium per serving fit into this eating plan.  · To find whole grains, look for the word \"whole\" as the first word in the ingredient list.  Shopping  · Buy products labeled as \"low-sodium\" or \"no salt added.\"  · Buy fresh foods. Avoid canned foods and premade or frozen meals.  Cooking  · Avoid adding salt when cooking. Use salt-free seasonings or herbs instead of table salt or sea salt. Check with your health care provider or pharmacist before using salt substitutes.  · Do not pittman foods. Cook foods using healthy methods such as baking, boiling, " grilling, and broiling instead.  · Cook with heart-healthy oils, such as olive, canola, soybean, or sunflower oil.  Meal planning  · Eat a balanced diet that includes:  ? 5 or more servings of fruits and vegetables each day. At each meal, try to fill half of your plate with fruits and vegetables.  ? Up to 6-8 servings of whole grains each day.  ? Less than 6 oz of lean meat, poultry, or fish each day. A 3-oz serving of meat is about the same size as a deck of cards. One egg equals 1 oz.  ? 2 servings of low-fat dairy each day.  ? A serving of nuts, seeds, or beans 5 times each week.  ? Heart-healthy fats. Healthy fats called Omega-3 fatty acids are found in foods such as flaxseeds and coldwater fish, like sardines, salmon, and mackerel.  · Limit how much you eat of the following:  ? Canned or prepackaged foods.  ? Food that is high in trans fat, such as fried foods.  ? Food that is high in saturated fat, such as fatty meat.  ? Sweets, desserts, sugary drinks, and other foods with added sugar.  ? Full-fat dairy products.  · Do not salt foods before eating.  · Try to eat at least 2 vegetarian meals each week.  · Eat more home-cooked food and less restaurant, buffet, and fast food.  · When eating at a restaurant, ask that your food be prepared with less salt or no salt, if possible.  What foods are recommended?  The items listed may not be a complete list. Talk with your dietitian about what dietary choices are best for you.  Grains  Whole-grain or whole-wheat bread. Whole-grain or whole-wheat pasta. Brown rice. Oatmeal. Quinoa. Bulgur. Whole-grain and low-sodium cereals. Katelynn bread. Low-fat, low-sodium crackers. Whole-wheat flour tortillas.  Vegetables  Fresh or frozen vegetables (raw, steamed, roasted, or grilled). Low-sodium or reduced-sodium tomato and vegetable juice. Low-sodium or reduced-sodium tomato sauce and tomato paste. Low-sodium or reduced-sodium canned vegetables.  Fruits  All fresh, dried, or frozen  fruit. Canned fruit in natural juice (without added sugar).  Meat and other protein foods  Skinless chicken or turkey. Ground chicken or turkey. Pork with fat trimmed off. Fish and seafood. Egg whites. Dried beans, peas, or lentils. Unsalted nuts, nut butters, and seeds. Unsalted canned beans. Lean cuts of beef with fat trimmed off. Low-sodium, lean deli meat.  Dairy  Low-fat (1%) or fat-free (skim) milk. Fat-free, low-fat, or reduced-fat cheeses. Nonfat, low-sodium ricotta or cottage cheese. Low-fat or nonfat yogurt. Low-fat, low-sodium cheese.  Fats and oils  Soft margarine without trans fats. Vegetable oil. Low-fat, reduced-fat, or light mayonnaise and salad dressings (reduced-sodium). Canola, safflower, olive, soybean, and sunflower oils. Avocado.  Seasoning and other foods  Herbs. Spices. Seasoning mixes without salt. Unsalted popcorn and pretzels. Fat-free sweets.  What foods are not recommended?  The items listed may not be a complete list. Talk with your dietitian about what dietary choices are best for you.  Grains  Baked goods made with fat, such as croissants, muffins, or some breads. Dry pasta or rice meal packs.  Vegetables  Creamed or fried vegetables. Vegetables in a cheese sauce. Regular canned vegetables (not low-sodium or reduced-sodium). Regular canned tomato sauce and paste (not low-sodium or reduced-sodium). Regular tomato and vegetable juice (not low-sodium or reduced-sodium). Pickles. Olives.  Fruits  Canned fruit in a light or heavy syrup. Fried fruit. Fruit in cream or butter sauce.  Meat and other protein foods  Fatty cuts of meat. Ribs. Fried meat. Valdes. Sausage. Bologna and other processed lunch meats. Salami. Fatback. Hotdogs. Bratwurst. Salted nuts and seeds. Canned beans with added salt. Canned or smoked fish. Whole eggs or egg yolks. Chicken or turkey with skin.  Dairy  Whole or 2% milk, cream, and half-and-half. Whole or full-fat cream cheese. Whole-fat or sweetened yogurt. Full-fat  cheese. Nondairy creamers. Whipped toppings. Processed cheese and cheese spreads.  Fats and oils  Butter. Stick margarine. Lard. Shortening. Ghee. Valdes fat. Tropical oils, such as coconut, palm kernel, or palm oil.  Seasoning and other foods  Salted popcorn and pretzels. Onion salt, garlic salt, seasoned salt, table salt, and sea salt. Worcestershire sauce. Tartar sauce. Barbecue sauce. Teriyaki sauce. Soy sauce, including reduced-sodium. Steak sauce. Canned and packaged gravies. Fish sauce. Oyster sauce. Cocktail sauce. Horseradish that you find on the shelf. Ketchup. Mustard. Meat flavorings and tenderizers. Bouillon cubes. Hot sauce and Tabasco sauce. Premade or packaged marinades. Premade or packaged taco seasonings. Relishes. Regular salad dressings.  Where to find more information:  · National Heart, Lung, and Blood [place]: www.nhlbi.nih.gov  · American Heart Association: www.heart.org  Summary  · The DASH eating plan is a healthy eating plan that has been shown to reduce high blood pressure (hypertension). It may also reduce your risk for type 2 diabetes, heart disease, and stroke.  · With the DASH eating plan, you should limit salt (sodium) intake to 2,300 mg a day. If you have hypertension, you may need to reduce your sodium intake to 1,500 mg a day.  · When on the DASH eating plan, aim to eat more fresh fruits and vegetables, whole grains, lean proteins, low-fat dairy, and heart-healthy fats.  · Work with your health care provider or diet and nutrition specialist (dietitian) to adjust your eating plan to your individual calorie needs.  This information is not intended to replace advice given to you by your health care provider. Make sure you discuss any questions you have with your health care provider.  Document Released: 12/06/2012 Document Revised: 12/11/2017 Document Reviewed: 12/11/2017  [employer] Patient Education © 2020 [employer]  ove diet, exercise, follow healthy heart diet

## 2020-03-12 ENCOUNTER — TELEPHONE (OUTPATIENT)
Dept: FAMILY MEDICINE CLINIC | Facility: CLINIC | Age: 60
End: 2020-03-12

## 2020-03-12 NOTE — TELEPHONE ENCOUNTER
Patient reports that he is experiencing hip pain.  He said that yesterday, he fell off a ladder at work due to the pain.  He requested an XR order for his left hip.  He said that it is the same side as the foot coldness and leg pains.

## 2020-03-13 NOTE — TELEPHONE ENCOUNTER
Pt to call Monday with symptoms, if wonb may order w/u at that time. RICE this weekend and ibuprofen/tylenol prn

## 2020-03-17 ENCOUNTER — HOSPITAL ENCOUNTER (OUTPATIENT)
Dept: GENERAL RADIOLOGY | Facility: HOSPITAL | Age: 60
Discharge: HOME | End: 2020-03-17

## 2020-03-17 ENCOUNTER — HOSPITAL ENCOUNTER (OUTPATIENT)
Dept: CARDIOLOGY | Facility: HOSPITAL | Age: 60
Discharge: HOME | End: 2020-03-17
Admitting: NURSE PRACTITIONER

## 2020-03-17 DIAGNOSIS — M25.552 LEFT HIP PAIN: Primary | ICD-10-CM

## 2020-03-17 DIAGNOSIS — R20.9 COLD LEFT FOOT: ICD-10-CM

## 2020-03-17 DIAGNOSIS — M25.552 LEFT HIP PAIN: ICD-10-CM

## 2020-03-17 LAB
BH CV LOWER ARTERIAL LEFT ABI RATIO: 1.14
BH CV LOWER ARTERIAL LEFT DORSALIS PEDIS SYS MAX: 165 MMHG
BH CV LOWER ARTERIAL LEFT GREAT TOE SYS MAX: 114 MMHG
BH CV LOWER ARTERIAL LEFT POST TIBIAL SYS MAX: 177 MMHG
BH CV LOWER ARTERIAL LEFT TBI RATIO: 0.74
BH CV LOWER ARTERIAL RIGHT ABI RATIO: 1.17
BH CV LOWER ARTERIAL RIGHT DORSALIS PEDIS SYS MAX: 162 MMHG
BH CV LOWER ARTERIAL RIGHT GREAT TOE SYS MAX: 104 MMHG
BH CV LOWER ARTERIAL RIGHT POST TIBIAL SYS MAX: 182 MMHG
BH CV LOWER ARTERIAL RIGHT TBI RATIO: 0.67
UPPER ARTERIAL LEFT ARM BRACHIAL SYS MAX: 145 MMHG
UPPER ARTERIAL RIGHT ARM BRACHIAL SYS MAX: 155 MMHG

## 2020-03-17 PROCEDURE — 93922 UPR/L XTREMITY ART 2 LEVELS: CPT

## 2020-03-17 PROCEDURE — 73502 X-RAY EXAM HIP UNI 2-3 VIEWS: CPT

## 2020-06-10 ENCOUNTER — TELEMEDICINE (OUTPATIENT)
Dept: FAMILY MEDICINE CLINIC | Facility: CLINIC | Age: 60
End: 2020-06-10

## 2020-06-10 DIAGNOSIS — Z53.21 PATIENT LEFT WITHOUT BEING SEEN: Primary | ICD-10-CM

## 2020-06-11 ENCOUNTER — TELEPHONE (OUTPATIENT)
Dept: FAMILY MEDICINE CLINIC | Facility: CLINIC | Age: 60
End: 2020-06-11

## 2020-06-11 ENCOUNTER — LAB (OUTPATIENT)
Dept: FAMILY MEDICINE CLINIC | Facility: CLINIC | Age: 60
End: 2020-06-11

## 2020-06-11 DIAGNOSIS — E29.1 HYPOGONADISM, MALE: Primary | ICD-10-CM

## 2020-06-11 RX ORDER — TESTOSTERONE CYPIONATE 200 MG/ML
200 INJECTION, SOLUTION INTRAMUSCULAR
Status: SHIPPED | OUTPATIENT
Start: 2020-06-11

## 2020-06-29 ENCOUNTER — OFFICE VISIT (OUTPATIENT)
Dept: FAMILY MEDICINE CLINIC | Facility: CLINIC | Age: 60
End: 2020-06-29

## 2020-06-29 VITALS
HEIGHT: 68 IN | HEART RATE: 57 BPM | SYSTOLIC BLOOD PRESSURE: 151 MMHG | OXYGEN SATURATION: 97 % | DIASTOLIC BLOOD PRESSURE: 83 MMHG | TEMPERATURE: 96.6 F | BODY MASS INDEX: 29.7 KG/M2 | WEIGHT: 196 LBS

## 2020-06-29 DIAGNOSIS — M77.12 EPICONDYLITIS, LATERAL, LEFT: ICD-10-CM

## 2020-06-29 DIAGNOSIS — E78.49 OTHER HYPERLIPIDEMIA: ICD-10-CM

## 2020-06-29 DIAGNOSIS — F41.9 ANXIETY: ICD-10-CM

## 2020-06-29 DIAGNOSIS — F32.A DEPRESSION, UNSPECIFIED DEPRESSION TYPE: ICD-10-CM

## 2020-06-29 DIAGNOSIS — Z12.5 PROSTATE CANCER SCREENING: ICD-10-CM

## 2020-06-29 DIAGNOSIS — B35.1 FUNGAL TOENAIL INFECTION: ICD-10-CM

## 2020-06-29 DIAGNOSIS — E29.1 HYPOGONADISM, MALE: ICD-10-CM

## 2020-06-29 DIAGNOSIS — I10 ESSENTIAL HYPERTENSION: Primary | ICD-10-CM

## 2020-06-29 PROCEDURE — 84443 ASSAY THYROID STIM HORMONE: CPT | Performed by: NURSE PRACTITIONER

## 2020-06-29 PROCEDURE — 80053 COMPREHEN METABOLIC PANEL: CPT | Performed by: NURSE PRACTITIONER

## 2020-06-29 PROCEDURE — G0103 PSA SCREENING: HCPCS | Performed by: NURSE PRACTITIONER

## 2020-06-29 PROCEDURE — 84403 ASSAY OF TOTAL TESTOSTERONE: CPT | Performed by: NURSE PRACTITIONER

## 2020-06-29 PROCEDURE — 80061 LIPID PANEL: CPT | Performed by: NURSE PRACTITIONER

## 2020-06-29 PROCEDURE — 96372 THER/PROPH/DIAG INJ SC/IM: CPT | Performed by: NURSE PRACTITIONER

## 2020-06-29 PROCEDURE — 99214 OFFICE O/P EST MOD 30 MIN: CPT | Performed by: NURSE PRACTITIONER

## 2020-06-29 PROCEDURE — 85027 COMPLETE CBC AUTOMATED: CPT | Performed by: NURSE PRACTITIONER

## 2020-06-29 RX ORDER — IBUPROFEN 800 MG/1
800 TABLET ORAL EVERY 8 HOURS PRN
Qty: 90 TABLET | Refills: 1 | Status: SHIPPED | OUTPATIENT
Start: 2020-06-29 | End: 2020-09-10

## 2020-06-29 RX ORDER — HYDROXYZINE HYDROCHLORIDE 10 MG/1
10 TABLET, FILM COATED ORAL 3 TIMES DAILY PRN
Qty: 30 TABLET | Refills: 0 | Status: SHIPPED | OUTPATIENT
Start: 2020-06-29 | End: 2020-12-17

## 2020-06-29 RX ADMIN — TESTOSTERONE CYPIONATE 200 MG: 200 INJECTION, SOLUTION INTRAMUSCULAR at 12:27

## 2020-06-29 NOTE — PROGRESS NOTES
Chief Complaint   Patient presents with   • Annual Exam       HPI     HTN, stable on meds and takes as directed, denies chest pain, headache, shortness of air, palpitations and swelling of extremities.     Hypgonadism, receiving testosterone inj q2wks, still with fatigue, reports anxiety/depression.     Fungal nail, 2nd toe right foot , using otc fungal solution which is not improving cond.     Left elbow pain, fell at the pool recently, but had pain prior to fall, does have pain with rotation and movement, difficulty with lifting, holding items, does have repetitive movements with work.     Anxiety, pt reports sadness and nervousness lately intermittently, reports several divorces in past and wants to be happy. Patient denies significant weight loss/gain, insomnia, hypersomnia, psychomotor agitation, psychomotor retardation, fatigue (loss of energy), feelings of worthlessness (guilt), impaired concentration (indecisiveness), thoughts of death or suicide.       The following portions of the patient's history were reviewed and updated as appropriate: allergies, current medications, past family history, past medical history, past social history, past surgical history and problem list.    Past Medical History:   Diagnosis Date   • Headache    • Hx of hypogonadism    • Hyperlipidemia    • Hypertension      Past Surgical History:   Procedure Laterality Date   • OTHER SURGICAL HISTORY  2012    TIA- [facility]     Family History   Problem Relation Age of Onset   • Cancer Mother      Social History     Tobacco Use   • Smoking status: Former Smoker   • Smokeless tobacco: Never Used   • Tobacco comment: quit x30 years   Substance Use Topics   • Alcohol use: No     Frequency: Never         Current Outpatient Medications:   •  lisinopril (PRINIVIL,ZESTRIL) 20 MG tablet, Take 1 tablet by mouth Daily., Disp: 90 tablet, Rfl: 1  •  verapamil ER (VERELAN) 240 MG 24 hr capsule, Take 1 capsule by mouth Every Night., Disp: 90 capsule,  "Rfl: 1  •  ciclopirox (PENLAC) 8 % solution, Apply to the affected toe nightly for up to 12 weeks., Disp: 6 mL, Rfl: 0  •  hydrOXYzine (ATARAX) 10 MG tablet, Take 1 tablet by mouth 3 (Three) Times a Day As Needed for Anxiety., Disp: 30 tablet, Rfl: 0  •  ibuprofen (ADVIL,MOTRIN) 800 MG tablet, Take 1 tablet by mouth Every 8 (Eight) Hours As Needed for Moderate Pain ., Disp: 90 tablet, Rfl: 1    Current Facility-Administered Medications:   •  Testosterone Cypionate (DEPOTESTOTERONE CYPIONATE) injection 200 mg, 200 mg, Intramuscular, Q14 Days, JensRina, APRN, 200 mg at 06/29/20 1227      Review of Systems     A full 12 point review of systems has been obtained as mentioned in HPI, otherwise negative      Vitals:    06/29/20 1104   BP: 151/83   BP Location: Left arm   Patient Position: Sitting   Cuff Size: Adult   Pulse: 57   Temp: 96.6 °F (35.9 °C)   TempSrc: Skin   SpO2: 97%   Weight: 88.9 kg (196 lb)   Height: 172.7 cm (67.99\")     Body mass index is 29.81 kg/m².    Physical Exam   Constitutional: He is oriented to person, place, and time. He appears well-developed and well-nourished. No distress.   HENT:   Head: Normocephalic and atraumatic.   Eyes: Pupils are equal, round, and reactive to light.   Neck: Normal range of motion. No thyromegaly present.   Cardiovascular: Normal rate, regular rhythm, normal heart sounds and intact distal pulses.   Pulmonary/Chest: Effort normal and breath sounds normal. No respiratory distress.   Abdominal: Soft. Bowel sounds are normal. He exhibits no distension. There is no tenderness.   Musculoskeletal: Normal range of motion. He exhibits tenderness (mild ttp L lateral epicondyle, no edema).   Neurological: He is alert and oriented to person, place, and time.   Skin: Skin is warm and dry. He is not diaphoretic. No erythema.   Psychiatric: His speech is normal and behavior is normal. Judgment and thought content normal. His mood appears anxious. Cognition and memory are " normal. He exhibits a depressed mood.   Nursing note and vitals reviewed.      No visits with results within 7 Day(s) from this visit.   Latest known visit with results is:   Hospital Outpatient Visit on 03/17/2020   Component Date Value Ref Range Status   • RIGHT DORSALIS PEDIS SYS MAX 03/17/2020 162  mmHg Final   • RIGHT POST TIBIAL SYS MAX 03/17/2020 182  mmHg Final   • RIGHT GREAT TOE SYS MAX 03/17/2020 104  mmHg Final   • RIGHT TITA RATIO 03/17/2020 1.17   Final   • RIGHT TBI RATIO 03/17/2020 0.67   Final   • LEFT DORSALIS PEDIS SYS MAX 03/17/2020 165  mmHg Final   • LEFT POST TIBIAL SYS MAX 03/17/2020 177  mmHg Final   • LEFT GREAT TOE SYS MAX 03/17/2020 114  mmHg Final   • LEFT TITA RATIO 03/17/2020 1.14   Final   • LEFT TBI RATIO 03/17/2020 0.74   Final   • Upper arterial right arm brachial * 03/17/2020 155  mmHg Final   • Upper arterial left arm brachial s* 03/17/2020 145  mmHg Final       Diagnoses and all orders for this visit:    1. Essential hypertension (Primary)  -     Comprehensive Metabolic Panel  -     CBC (No Diff)  -     TSH    2. Other hyperlipidemia  -     Lipid Panel    3. Hypogonadism, male  -     Testosterone    4. Anxiety    5. Depression, unspecified depression type    6. Fungal toenail infection    7. Prostate cancer screening  -     PSA Screen    8. Epicondylitis, lateral, left    Other orders  -     ibuprofen (ADVIL,MOTRIN) 800 MG tablet; Take 1 tablet by mouth Every 8 (Eight) Hours As Needed for Moderate Pain .  Dispense: 90 tablet; Refill: 1  -     hydrOXYzine (ATARAX) 10 MG tablet; Take 1 tablet by mouth 3 (Three) Times a Day As Needed for Anxiety.  Dispense: 30 tablet; Refill: 0  -     ciclopirox (PENLAC) 8 % solution; Apply to the affected toe nightly for up to 12 weeks.  Dispense: 6 mL; Refill: 0       no rf needed at this time  bp elevated but stable outside office  labs today, notify results  Continue testosterone inj q2 weeks, check level today  Start vicks to toenail in am,  penlac hs  Ibuprofen for L elbow prn, RICE  hydrox and consider ssri in future.   rtc in 6 months or earlier if needed to re-eval anxiety/depression

## 2020-06-30 ENCOUNTER — TELEPHONE (OUTPATIENT)
Dept: FAMILY MEDICINE CLINIC | Facility: CLINIC | Age: 60
End: 2020-06-30

## 2020-06-30 LAB
ALBUMIN SERPL-MCNC: 4.6 G/DL (ref 3.5–5.2)
ALBUMIN/GLOB SERPL: 1.8 G/DL
ALP SERPL-CCNC: 62 U/L (ref 39–117)
ALT SERPL W P-5'-P-CCNC: 21 U/L (ref 1–41)
ANION GAP SERPL CALCULATED.3IONS-SCNC: 12.2 MMOL/L (ref 5–15)
AST SERPL-CCNC: 20 U/L (ref 1–40)
BILIRUB SERPL-MCNC: 0.3 MG/DL (ref 0.2–1.2)
BUN SERPL-MCNC: 19 MG/DL (ref 8–23)
BUN/CREAT SERPL: 20 (ref 7–25)
CALCIUM SPEC-SCNC: 9.1 MG/DL (ref 8.6–10.5)
CHLORIDE SERPL-SCNC: 107 MMOL/L (ref 98–107)
CHOLEST SERPL-MCNC: 209 MG/DL (ref 0–200)
CO2 SERPL-SCNC: 21.8 MMOL/L (ref 22–29)
CREAT SERPL-MCNC: 0.95 MG/DL (ref 0.76–1.27)
DEPRECATED RDW RBC AUTO: 41.5 FL (ref 37–54)
ERYTHROCYTE [DISTWIDTH] IN BLOOD BY AUTOMATED COUNT: 13.1 % (ref 12.3–15.4)
GFR SERPL CREATININE-BSD FRML MDRD: 81 ML/MIN/1.73
GLOBULIN UR ELPH-MCNC: 2.6 GM/DL
GLUCOSE SERPL-MCNC: 91 MG/DL (ref 65–99)
HCT VFR BLD AUTO: 43.7 % (ref 37.5–51)
HDLC SERPL-MCNC: 42 MG/DL (ref 40–60)
HGB BLD-MCNC: 15 G/DL (ref 13–17.7)
LDLC SERPL CALC-MCNC: 122 MG/DL (ref 0–100)
LDLC/HDLC SERPL: 2.91 {RATIO}
MCH RBC QN AUTO: 30.1 PG (ref 26.6–33)
MCHC RBC AUTO-ENTMCNC: 34.3 G/DL (ref 31.5–35.7)
MCV RBC AUTO: 87.6 FL (ref 79–97)
PLATELET # BLD AUTO: 248 10*3/MM3 (ref 140–450)
PMV BLD AUTO: 10.4 FL (ref 6–12)
POTASSIUM SERPL-SCNC: 4.5 MMOL/L (ref 3.5–5.2)
PROT SERPL-MCNC: 7.2 G/DL (ref 6–8.5)
PSA SERPL-MCNC: 1.86 NG/ML (ref 0–4)
RBC # BLD AUTO: 4.99 10*6/MM3 (ref 4.14–5.8)
SODIUM SERPL-SCNC: 141 MMOL/L (ref 136–145)
TESTOST SERPL-MCNC: 97.3 NG/DL (ref 193–740)
TRIGL SERPL-MCNC: 223 MG/DL (ref 0–150)
TSH SERPL DL<=0.05 MIU/L-ACNC: 3.81 UIU/ML (ref 0.27–4.2)
VLDLC SERPL-MCNC: 44.6 MG/DL
WBC # BLD AUTO: 6.19 10*3/MM3 (ref 3.4–10.8)

## 2020-07-21 ENCOUNTER — OFFICE VISIT (OUTPATIENT)
Dept: FAMILY MEDICINE CLINIC | Facility: CLINIC | Age: 60
End: 2020-07-21

## 2020-07-21 VITALS
HEIGHT: 68 IN | OXYGEN SATURATION: 100 % | BODY MASS INDEX: 27.95 KG/M2 | DIASTOLIC BLOOD PRESSURE: 79 MMHG | SYSTOLIC BLOOD PRESSURE: 145 MMHG | HEART RATE: 59 BPM | TEMPERATURE: 97.5 F | WEIGHT: 184.4 LBS

## 2020-07-21 DIAGNOSIS — E29.1 HYPOGONADISM, MALE: ICD-10-CM

## 2020-07-21 DIAGNOSIS — R59.9 ENLARGED LYMPH NODE: Primary | ICD-10-CM

## 2020-07-21 DIAGNOSIS — S80.869A INSECT BITE OF LOWER EXTREMITY, UNSPECIFIED LATERALITY, INITIAL ENCOUNTER: ICD-10-CM
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
## 2020-07-21 PROCEDURE — 96372 THER/PROPH/DIAG INJ SC/IM: CPT | Performed by: NURSE PRACTITIONER

## 2020-07-21 PROCEDURE — 99213 OFFICE O/P EST LOW 20 MIN: CPT | Performed by: NURSE PRACTITIONER

## 2020-07-21 RX ADMIN — TESTOSTERONE CYPIONATE 200 MG: 200 INJECTION, SOLUTION INTRAMUSCULAR at 09:42

## 2020-07-21 NOTE — PROGRESS NOTES
Chief Complaint   Patient presents with   • GI Problem     lump on right groin       HPI     Pt reports he was spraying weeds, got insect bites on his legs and a lump appeared yesterday on his right groin, it is nontender, no erythema, and denies fever, fatigue, diaphoresis, but he is concerned. The insect bites are scattered on the BLE, erythemic, no drainage w/ pruritis.     The following portions of the patient's history were reviewed and updated as appropriate: allergies, current medications, past family history, past medical history, past social history, past surgical history and problem list.    Past Medical History:   Diagnosis Date   • Headache    • Hx of hypogonadism    • Hyperlipidemia    • Hypertension      Past Surgical History:   Procedure Laterality Date   • OTHER SURGICAL HISTORY  2012    TIA- [facility]     Family History   Problem Relation Age of Onset   • Cancer Mother      Social History     Tobacco Use   • Smoking status: Former Smoker   • Smokeless tobacco: Never Used   • Tobacco comment: quit x30 years   Substance Use Topics   • Alcohol use: No     Frequency: Never         Current Outpatient Medications:   •  ciclopirox (PENLAC) 8 % solution, Apply to the affected toe nightly for up to 12 weeks., Disp: 6 mL, Rfl: 0  •  hydrOXYzine (ATARAX) 10 MG tablet, Take 1 tablet by mouth 3 (Three) Times a Day As Needed for Anxiety., Disp: 30 tablet, Rfl: 0  •  ibuprofen (ADVIL,MOTRIN) 800 MG tablet, Take 1 tablet by mouth Every 8 (Eight) Hours As Needed for Moderate Pain ., Disp: 90 tablet, Rfl: 1  •  lisinopril (PRINIVIL,ZESTRIL) 20 MG tablet, Take 1 tablet by mouth Daily., Disp: 90 tablet, Rfl: 1  •  verapamil ER (VERELAN) 240 MG 24 hr capsule, Take 1 capsule by mouth Every Night., Disp: 90 capsule, Rfl: 1    Current Facility-Administered Medications:   •  Testosterone Cypionate (DEPOTESTOTERONE CYPIONATE) injection 200 mg, 200 mg, Intramuscular, Q14 Days, Rina Colindres APRN, 200 mg at 06/29/20  "1227      Review of Systems   Constitutional: Negative for chills, diaphoresis, fatigue, fever and unexpected weight gain.   HENT: Negative.    Genitourinary: Negative for scrotal swelling.   Musculoskeletal: Negative.    Skin:        Insect bites both legs and lump right groin   Psychiatric/Behavioral: Negative.        Vitals:    07/21/20 0859   BP: 145/79   BP Location: Left arm   Patient Position: Sitting   Cuff Size: Adult   Pulse: 59   Temp: 97.5 °F (36.4 °C)   TempSrc: Skin   SpO2: 100%   Weight: 83.6 kg (184 lb 6.4 oz)   Height: 172.7 cm (67.99\")     Body mass index is 28.04 kg/m².    Physical Exam   Constitutional: He is oriented to person, place, and time. He appears well-developed and well-nourished. No distress.   HENT:   Head: Normocephalic and atraumatic.   Eyes: Pupils are equal, round, and reactive to light.   Neck: Normal range of motion. No thyromegaly present.   Abdominal: He exhibits no distension. There is no tenderness.   Musculoskeletal: Normal range of motion.   Neurological: He is alert and oriented to person, place, and time.   Skin: Skin is warm and dry. Rash (scattered insect bites BLE and one 1/2 dollar size bite 1 inch from the enlarged lymphnode.  non fluctuant w/o drainage ) noted. He is not diaphoretic. No erythema.   R inguinal lymph node enlargement, approx 2x2cm. nontender w/o erythema.    Psychiatric: He has a normal mood and affect. His behavior is normal. Judgment and thought content normal.   Nursing note and vitals reviewed.      No visits with results within 7 Day(s) from this visit.   Latest known visit with results is:   Office Visit on 06/29/2020   Component Date Value Ref Range Status   • Testosterone, Total 06/29/2020 97.30* 193.00 - 740.00 ng/dL Final   • Total Cholesterol 06/29/2020 209* 0 - 200 mg/dL Final   • Triglycerides 06/29/2020 223* 0 - 150 mg/dL Final   • HDL Cholesterol 06/29/2020 42  40 - 60 mg/dL Final   • LDL Cholesterol  06/29/2020 122* 0 - 100 mg/dL " Final   • VLDL Cholesterol 06/29/2020 44.6  mg/dL Final   • LDL/HDL Ratio 06/29/2020 2.91   Final   • Glucose 06/29/2020 91  65 - 99 mg/dL Final   • BUN 06/29/2020 19  8 - 23 mg/dL Final   • Creatinine 06/29/2020 0.95  0.76 - 1.27 mg/dL Final   • Sodium 06/29/2020 141  136 - 145 mmol/L Final   • Potassium 06/29/2020 4.5  3.5 - 5.2 mmol/L Final   • Chloride 06/29/2020 107  98 - 107 mmol/L Final   • CO2 06/29/2020 21.8* 22.0 - 29.0 mmol/L Final   • Calcium 06/29/2020 9.1  8.6 - 10.5 mg/dL Final   • Total Protein 06/29/2020 7.2  6.0 - 8.5 g/dL Final   • Albumin 06/29/2020 4.60  3.50 - 5.20 g/dL Final   • ALT (SGPT) 06/29/2020 21  1 - 41 U/L Final   • AST (SGOT) 06/29/2020 20  1 - 40 U/L Final   • Alkaline Phosphatase 06/29/2020 62  39 - 117 U/L Final   • Total Bilirubin 06/29/2020 0.3  0.2 - 1.2 mg/dL Final   • eGFR Non African Amer 06/29/2020 81  >60 mL/min/1.73 Final   • Globulin 06/29/2020 2.6  gm/dL Final   • A/G Ratio 06/29/2020 1.8  g/dL Final   • BUN/Creatinine Ratio 06/29/2020 20.0  7.0 - 25.0 Final   • Anion Gap 06/29/2020 12.2  5.0 - 15.0 mmol/L Final   • WBC 06/29/2020 6.19  3.40 - 10.80 10*3/mm3 Final   • RBC 06/29/2020 4.99  4.14 - 5.80 10*6/mm3 Final   • Hemoglobin 06/29/2020 15.0  13.0 - 17.7 g/dL Final   • Hematocrit 06/29/2020 43.7  37.5 - 51.0 % Final   • MCV 06/29/2020 87.6  79.0 - 97.0 fL Final   • MCH 06/29/2020 30.1  26.6 - 33.0 pg Final   • MCHC 06/29/2020 34.3  31.5 - 35.7 g/dL Final   • RDW 06/29/2020 13.1  12.3 - 15.4 % Final   • RDW-SD 06/29/2020 41.5  37.0 - 54.0 fl Final   • MPV 06/29/2020 10.4  6.0 - 12.0 fL Final   • Platelets 06/29/2020 248  140 - 450 10*3/mm3 Final   • TSH 06/29/2020 3.810  0.270 - 4.200 uIU/mL Final   • PSA 06/29/2020 1.860  0.000 - 4.000 ng/mL Final       Diagnoses and all orders for this visit:    1. Enlarged lymph node (Primary)    2. Insect bite of lower extremity, unspecified laterality, initial encounter    3. Hypogonadism, male      Monitor for now, likely d/t  insect bites BLE. Notify if wonb in 2 weeks, if still enlarged at 4 weeks will check u/s.     Ok to receive testosterone injection today as well

## 2020-08-10 RX ORDER — VERAPAMIL HYDROCHLORIDE 240 MG/1
CAPSULE, EXTENDED RELEASE ORAL
Qty: 90 CAPSULE | Refills: 0 | Status: SHIPPED | OUTPATIENT
Start: 2020-08-10 | End: 2020-10-27 | Stop reason: SDUPTHER

## 2020-08-12 ENCOUNTER — LAB (OUTPATIENT)
Dept: FAMILY MEDICINE CLINIC | Facility: CLINIC | Age: 60
End: 2020-08-12

## 2020-08-12 PROCEDURE — 96372 THER/PROPH/DIAG INJ SC/IM: CPT | Performed by: NURSE PRACTITIONER

## 2020-08-12 RX ADMIN — TESTOSTERONE CYPIONATE 200 MG: 200 INJECTION, SOLUTION INTRAMUSCULAR at 12:00

## 2020-08-27 DIAGNOSIS — E29.1 HYPOGONADISM, MALE: Primary | ICD-10-CM

## 2020-08-27 RX ORDER — TESTOSTERONE CYPIONATE 200 MG/ML
200 INJECTION, SOLUTION INTRAMUSCULAR
Qty: 10 ML | Refills: 0 | Status: SHIPPED | OUTPATIENT
Start: 2020-08-27 | End: 2020-10-27 | Stop reason: SDUPTHER

## 2020-08-27 NOTE — TELEPHONE ENCOUNTER
Pt requested refill for Testosterone Cypionate (DEPOTESTOTERONE CYPIONATE) injection 200 mg [[id number]] (Order [id number]) pt will come in tomorrow

## 2020-08-28 ENCOUNTER — TELEPHONE (OUTPATIENT)
Dept: FAMILY MEDICINE CLINIC | Facility: CLINIC | Age: 60
End: 2020-08-28

## 2020-08-31 ENCOUNTER — CLINICAL SUPPORT (OUTPATIENT)
Dept: FAMILY MEDICINE CLINIC | Facility: CLINIC | Age: 60
End: 2020-08-31

## 2020-08-31 DIAGNOSIS — E03.9 ACQUIRED HYPOTHYROIDISM: ICD-10-CM

## 2020-08-31 DIAGNOSIS — E29.1 HYPOGONADISM, MALE: ICD-10-CM

## 2020-08-31 PROCEDURE — 96372 THER/PROPH/DIAG INJ SC/IM: CPT | Performed by: NURSE PRACTITIONER

## 2020-08-31 RX ADMIN — TESTOSTERONE CYPIONATE 200 MG: 200 INJECTION, SOLUTION INTRAMUSCULAR at 11:00

## 2020-09-02 ENCOUNTER — OFFICE VISIT (OUTPATIENT)
Dept: FAMILY MEDICINE CLINIC | Facility: CLINIC | Age: 60
End: 2020-09-02

## 2020-09-02 VITALS
BODY MASS INDEX: 28.95 KG/M2 | DIASTOLIC BLOOD PRESSURE: 74 MMHG | TEMPERATURE: 97.3 F | SYSTOLIC BLOOD PRESSURE: 137 MMHG | HEIGHT: 68 IN | WEIGHT: 191 LBS | OXYGEN SATURATION: 97 % | HEART RATE: 62 BPM

## 2020-09-02 DIAGNOSIS — M77.12 LATERAL EPICONDYLITIS OF LEFT ELBOW: Primary | ICD-10-CM

## 2020-09-02 PROCEDURE — 99213 OFFICE O/P EST LOW 20 MIN: CPT | Performed by: NURSE PRACTITIONER

## 2020-09-02 RX ORDER — GABAPENTIN 100 MG/1
100 CAPSULE ORAL 3 TIMES DAILY
Qty: 30 CAPSULE | Refills: 0 | Status: SHIPPED | OUTPATIENT
Start: 2020-09-02 | End: 2020-09-10 | Stop reason: SINTOL

## 2020-09-02 NOTE — PROGRESS NOTES
Chief Complaint   Patient presents with   • Numbness     bilateral fingers       HPI     Pt reports pain around the left elbow and forearm, with numbness, tingling into the fingers when picks up objects. Denies loss of motion. 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       The following portions of the patient's history were reviewed and updated as appropriate: allergies, current medications, past family history, past medical history, past social history, past surgical history and problem list.    Past Medical History:   Diagnosis Date   • Headache    • Hx of hypogonadism    • Hyperlipidemia    • Hypertension      Past Surgical History:   Procedure Laterality Date   • OTHER SURGICAL HISTORY  2012    TIA- [facility]     Family History   Problem Relation Age of Onset   • Cancer Mother      Social History     Tobacco Use   • Smoking status: Former Smoker   • Smokeless tobacco: Never Used   • Tobacco comment: quit x30 years   Substance Use Topics   • Alcohol use: No     Frequency: Never         Current Outpatient Medications:   •  lisinopril (PRINIVIL,ZESTRIL) 20 MG tablet, Take 1 tablet by mouth Daily., Disp: 90 tablet, Rfl: 1  •  Testosterone Cypionate (Depo-Testosterone) 200 MG/ML injection, Inject 1 mL into the appropriate muscle as directed by prescriber Every 14 (Fourteen) Days., Disp: 10 mL, Rfl: 0  •  verapamil ER (VERELAN) 240 MG 24 hr capsule, TAKE ONE CAPSULE BY MOUTH EVERY NIGHT, Disp: 90 capsule, Rfl: 0  •  diclofenac (VOLTAREN) 1 % gel gel, Apply 4 g topically to the appropriate area as directed 4 (Four) Times a Day As Needed (for left arm tendinitis pain)., Disp: 150 g, Rfl: 0  •  gabapentin (NEURONTIN) 100 MG capsule, Take 1 capsule by mouth 3 (Three) Times a Day., Disp: 30 capsule, Rfl: 0  •  hydrOXYzine (ATARAX) 10 MG tablet, Take 1 tablet by mouth 3 (Three) Times a Day As Needed for Anxiety., Disp: 30 tablet, Rfl: 0  •  ibuprofen (ADVIL,MOTRIN) 800 MG tablet, Take 1 tablet by mouth  "Every 8 (Eight) Hours As Needed for Moderate Pain ., Disp: 90 tablet, Rfl: 1    Current Facility-Administered Medications:   •  Testosterone Cypionate (DEPOTESTOTERONE CYPIONATE) injection 200 mg, 200 mg, Intramuscular, Q14 Days, Rina Colindres, APRN, 200 mg at 08/31/20 1100      Review of Systems       Obtained as mentioned in HPI, otherwise negative.       Vitals:    09/02/20 1332   BP: 137/74   BP Location: Left arm   Patient Position: Sitting   Cuff Size: Adult   Pulse: 62   Temp: 97.3 °F (36.3 °C)   TempSrc: Skin   SpO2: 97%   Weight: 86.6 kg (191 lb)   Height: 172.7 cm (67.99\")     Body mass index is 29.05 kg/m².    Physical Exam   Constitutional: He is oriented to person, place, and time. He appears well-developed and well-nourished. No distress.   HENT:   Head: Normocephalic and atraumatic.   Eyes: Pupils are equal, round, and reactive to light.   Neck: Normal range of motion. No thyromegaly present.   Pulmonary/Chest: Effort normal.   Abdominal: Soft. Bowel sounds are normal. He exhibits no distension. There is no tenderness.   Musculoskeletal: Normal range of motion. He exhibits tenderness (left lateral epicondyle ttp, firm and tender mid forearm distal to the elbow).   Neurological: He is alert and oriented to person, place, and time.   Skin: Skin is warm and dry. He is not diaphoretic. No erythema.   Psychiatric: He has a normal mood and affect. His behavior is normal. Judgment and thought content normal.   Nursing note and vitals reviewed.      No visits with results within 7 Day(s) from this visit.   Latest known visit with results is:   Office Visit on 06/29/2020   Component Date Value Ref Range Status   • Testosterone, Total 06/29/2020 97.30* 193.00 - 740.00 ng/dL Final   • Total Cholesterol 06/29/2020 209* 0 - 200 mg/dL Final   • Triglycerides 06/29/2020 223* 0 - 150 mg/dL Final   • HDL Cholesterol 06/29/2020 42  40 - 60 mg/dL Final   • LDL Cholesterol  06/29/2020 122* 0 - 100 mg/dL Final   • VLDL " Cholesterol 06/29/2020 44.6  mg/dL Final   • LDL/HDL Ratio 06/29/2020 2.91   Final   • Glucose 06/29/2020 91  65 - 99 mg/dL Final   • BUN 06/29/2020 19  8 - 23 mg/dL Final   • Creatinine 06/29/2020 0.95  0.76 - 1.27 mg/dL Final   • Sodium 06/29/2020 141  136 - 145 mmol/L Final   • Potassium 06/29/2020 4.5  3.5 - 5.2 mmol/L Final   • Chloride 06/29/2020 107  98 - 107 mmol/L Final   • CO2 06/29/2020 21.8* 22.0 - 29.0 mmol/L Final   • Calcium 06/29/2020 9.1  8.6 - 10.5 mg/dL Final   • Total Protein 06/29/2020 7.2  6.0 - 8.5 g/dL Final   • Albumin 06/29/2020 4.60  3.50 - 5.20 g/dL Final   • ALT (SGPT) 06/29/2020 21  1 - 41 U/L Final   • AST (SGOT) 06/29/2020 20  1 - 40 U/L Final   • Alkaline Phosphatase 06/29/2020 62  39 - 117 U/L Final   • Total Bilirubin 06/29/2020 0.3  0.2 - 1.2 mg/dL Final   • eGFR Non African Amer 06/29/2020 81  >60 mL/min/1.73 Final   • Globulin 06/29/2020 2.6  gm/dL Final   • A/G Ratio 06/29/2020 1.8  g/dL Final   • BUN/Creatinine Ratio 06/29/2020 20.0  7.0 - 25.0 Final   • Anion Gap 06/29/2020 12.2  5.0 - 15.0 mmol/L Final   • WBC 06/29/2020 6.19  3.40 - 10.80 10*3/mm3 Final   • RBC 06/29/2020 4.99  4.14 - 5.80 10*6/mm3 Final   • Hemoglobin 06/29/2020 15.0  13.0 - 17.7 g/dL Final   • Hematocrit 06/29/2020 43.7  37.5 - 51.0 % Final   • MCV 06/29/2020 87.6  79.0 - 97.0 fL Final   • MCH 06/29/2020 30.1  26.6 - 33.0 pg Final   • MCHC 06/29/2020 34.3  31.5 - 35.7 g/dL Final   • RDW 06/29/2020 13.1  12.3 - 15.4 % Final   • RDW-SD 06/29/2020 41.5  37.0 - 54.0 fl Final   • MPV 06/29/2020 10.4  6.0 - 12.0 fL Final   • Platelets 06/29/2020 248  140 - 450 10*3/mm3 Final   • TSH 06/29/2020 3.810  0.270 - 4.200 uIU/mL Final   • PSA 06/29/2020 1.860  0.000 - 4.000 ng/mL Final       Diagnoses and all orders for this visit:    1. Lateral epicondylitis of left elbow (Primary)  Comments:  Rest, ice, rec tennis elbow strap, and elevate. try diclofenac and gabapentin.     Other orders  -     diclofenac (VOLTAREN) 1  % gel gel; Apply 4 g topically to the appropriate area as directed 4 (Four) Times a Day As Needed (for left arm tendinitis pain).  Dispense: 150 g; Refill: 0  -     gabapentin (NEURONTIN) 100 MG capsule; Take 1 capsule by mouth 3 (Three) Times a Day.  Dispense: 30 capsule; Refill: 0    Return for Next scheduled follow up, sched htn panel and testosterone labs 1 wk prior to appt.

## 2020-09-02 NOTE — PATIENT INSTRUCTIONS
Tennis Elbow    Tennis elbow (lateral epicondylitis) is inflammation of tendons in your outer forearm, near your elbow. Tendons are tissues that connect muscle to bone. When you have tennis elbow, inflammation affects the tendons that you use to bend your wrist and move your hand up. Inflammation occurs in the lower part of the upper arm bone (humerus), where the tendons connect to the bone (lateral epicondyle).  Tennis elbow often affects people who play tennis, but anyone may get the condition from repeatedly extending the wrist or turning the forearm.  What are the causes?  This condition is usually caused by repeatedly extending the wrist, turning the forearm, and using the hands. It can result from sports or work that requires repetitive forearm movements. In some cases, it may be caused by a sudden injury.  What increases the risk?  You are more likely to develop tennis elbow if you play tennis or another racket sport. You also have a higher risk if you frequently use your hands for work. Besides people who play tennis, others at greater risk include:  · Musicians.  · , painters, and plumbers.  · Cooks.  · Mobile.  · People who work in factories.  · Construction workers.  · Butchers.  · People who use computers.  What are the signs or symptoms?  Symptoms of this condition include:  · Pain and tenderness in the forearm and the outer part of the elbow. Pain may be felt only when using the arm, or it may be there all the time.  · A burning feeling that starts in the elbow and spreads down the forearm.  · A weak  in the hand.  How is this diagnosed?  This condition may be diagnosed based on:  · Your symptoms and medical history.  · A physical exam.  · X-rays.  · MRI.  How is this treated?  Resting and icing your arm is often the first treatment. Your health care provider may also recommend:  · Medicines to reduce pain and inflammation. These may be in the form of a pill, topical gels, or shots of a  steroid medicine (cortisone).  · An elbow strap to reduce stress on the area.  · Physical therapy. This may include massage or exercises.  · An elbow brace to restrict the movements that cause symptoms.  If these treatments do not help relieve your symptoms, your health care provider may recommend surgery to remove damaged muscle and reattach healthy muscle to bone.  Follow these instructions at home:  Activity  · Rest your elbow and wrist and avoid activities that cause symptoms, as told by your health care provider.  · Do physical therapy exercises as instructed.  · If you lift an object, lift it with your palm facing up. This reduces stress on your elbow.  Lifestyle  · If your tennis elbow is caused by sports, check your equipment and make sure that:  ? You are using it correctly.  ? It is the best fit for you.  · If your tennis elbow is caused by work or computer use, take frequent breaks to stretch your arm. Talk with your manager about ways to manage your condition at work.  If you have a brace:  · Wear the brace or strap as told by your health care provider. Remove it only as told by your health care provider.  · Loosen the brace if your fingers tingle, become numb, or turn cold and blue.  · Keep the brace clean.  · If the brace is not waterproof, ask if you may remove it for bathing. If you must keep the brace on while bathing:  ? Do not let it get wet.  ? Cover it with a watertight covering when you take a bath or a shower.  General instructions    · If directed, put ice on the painful area:  ? Put ice in a plastic bag.  ? Place a towel between your skin and the bag.  ? Leave the ice on for 20 minutes, 2-3 times a day.  · Take over-the-counter and prescription medicines only as told by your health care provider.  · Keep all follow-up visits as told by your health care provider. This is important.  Contact a health care provider if:  · You have pain that gets worse or does not get better with  treatment.  · You have numbness or weakness in your forearm, hand, or fingers.  Summary  · Tennis elbow (lateral epicondylitis) is inflammation of tendons in your outer forearm, near your elbow.  · Common symptoms include pain and tenderness in your forearm and the outer part of your elbow.  · This condition is usually caused by repeatedly extending your wrist, turning your forearm, and using your hands.  · The first treatment is often resting and icing your arm to relieve symptoms. Further treatment may include taking medicine, getting physical therapy, wearing a brace or strap, or having surgery.  This information is not intended to replace advice given to you by your health care provider. Make sure you discuss any questions you have with your health care provider.  Document Released: 12/18/2006 Document Revised: 09/13/2019 Document Reviewed: 10/02/2018  [employer] Patient Education © 2020 [employer]      Tennis Elbow Rehab  Ask your health care provider which exercises are safe for you. Do exercises exactly as told by your health care provider and adjust them as directed. It is normal to feel mild stretching, pulling, tightness, or discomfort as you do these exercises. Stop right away if you feel sudden pain or your pain gets worse. Do not begin these exercises until told by your health care provider.  Stretching and range-of-motion exercises  These exercises warm up your muscles and joints and improve the movement and flexibility of your elbow. These exercises also help to relieve pain, numbness, and tingling.  Wrist flexion, assisted    1. Straighten your left / right elbow in front of you with your palm facing down toward the floor.  ? If told by your health care provider, bend your left / right elbow to a 90-degree angle (right angle) at your side.  2. With your other hand, gently push over the back of your left / right hand so your fingers point toward the floor (flexion). Stop when you feel a gentle  stretch on the back of your forearm.  3. Hold this position for __________ seconds.  Repeat __________ times. Complete this exercise __________ times a day.  Wrist extension, assisted    1. Straighten your left / right elbow in front of you with your palm facing up toward the ceiling.  ? If told by your health care provider, bend your left / right elbow to a 90-degree angle (right angle) at your side.  2. With your other hand, gently pull your left / right hand and fingers toward the floor (extension). Stop when you feel a gentle stretch on the palm side of your forearm.  3. Hold this position for __________ seconds.  Repeat __________ times. Complete this exercise __________ times a day.  Assisted forearm rotation, supination  1. Sit or stand with your left / right elbow bent to a 90-degree angle (right angle) at your side.  2. Using your uninjured hand, turn (rotate) your left / right palm up toward the ceiling (supination) until you feel a gentle stretch along the inside of your forearm.  3. Hold this position for __________ seconds.  Repeat __________ times. Complete this exercise __________ times a day.  Assisted forearm rotation, pronation  1. Sit or stand with your left / right elbow bent to a 90-degree angle (right angle) at your side.  2. Using your uninjured hand, rotate your left / right palm down toward the floor (pronation) until you feel a gentle stretch along the outside of your forearm.  3. Hold this position for __________ seconds.  Repeat __________ times. Complete this exercise __________ times a day.  Strengthening exercises  These exercises build strength and endurance in your forearm and elbow. Endurance is the ability to use your muscles for a long time, even after they get tired.  Radial deviation    1. Stand with a __________ weight or a hammer in your left / right hand. Or, sit while holding a rubber exercise band or tubing, with your left / right forearm supported on a table or  countertop.  ? If you are standing, position your forearm so that your thumb is facing forward. If you are sitting, position your forearm so that the thumb is facing the ceiling. This is the neutral position.  2. Raise your hand upward in front of you so your thumb moves toward the ceiling (radial deviation), or pull up on the rubber tubing. Keep your forearm and elbow still while you move your wrist only.  3. Hold this position for __________ seconds.  4. Slowly return to the starting position.  Repeat __________ times. Complete this exercise __________ times a day.  Wrist extension, eccentric  1. Sit with your left / right forearm palm-down and supported on a table or other surface. Let your left / right wrist extend over the edge of the surface.  2. Hold a __________ weight or a piece of exercise band or tubing in your left / right hand.  ? If using a rubber exercise band or tubing, hold the other end of the tubing with your other hand.  3. Use your uninjured hand to move your left / right hand up toward the ceiling.  4. Take your uninjured hand away and slowly return to the starting position using only your left / right hand. Lowering your arm under tension is called eccentric extension.  Repeat __________ times. Complete this exercise __________ times a day.  Wrist extension  Do not do this exercise if it causes pain at the outside of your elbow. Only do this exercise once instructed by your health care provider.  1. Sit with your left / right forearm supported on a table or other surface and your palm turned down toward the floor. Let your left / right wrist extend over the edge of the surface.  2. Hold a __________ weight or a piece of rubber exercise band or tubing.  ? If you are using a rubber exercise band or tubing, hold the band or tubing in place with your other hand to provide resistance.  3. Slowly bend your wrist so your hand moves up toward the ceiling (extension). Move only your wrist, keeping your  forearm and elbow still.  4. Hold this position for __________ seconds.  5. Slowly return to the starting position.  Repeat __________ times. Complete this exercise __________ times a day.  Forearm rotation, supination  To do this exercise, you will need a lightweight hammer or rubber mallet.  1. Sit with your left / right forearm supported on a table or other surface. Bend your elbow to a 90-degree angle (right angle). Position your forearm so that your palm is facing down toward the floor, with your hand resting over the edge of the table.  2. Hold a hammer in your left / right hand.  ? To make this exercise easier, hold the hammer near the head of the hammer.  ? To make this exercise harder, hold the hammer near the end of the handle.  3. Without moving your wrist or elbow, slowly rotate your forearm so your palm faces up toward the ceiling (supination).  4. Hold this position for __________ seconds.  5. Slowly return to the starting position.  Repeat __________ times. Complete this exercise __________ times a day.  Shoulder blade squeeze  1. Sit in a stable chair or stand with good posture. If you are sitting down, do not let your back touch the back of the chair.  2. Your arms should be at your sides with your elbows bent to a 90-degree angle (right angle). Position your forearms so that your thumbs are facing the ceiling (neutral position).  3. Without lifting your shoulders up, squeeze your shoulder blades tightly together.  4. Hold this position for __________ seconds.  5. Slowly release and return to the starting position.  Repeat __________ times. Complete this exercise __________ times a day.  This information is not intended to replace advice given to you by your health care provider. Make sure you discuss any questions you have with your health care provider.  Document Released: 12/18/2006 Document Revised: 04/09/2020 Document Reviewed: 02/11/2020  [employer] Patient Education © 2020 [employer]

## 2020-09-08 ENCOUNTER — TELEPHONE (OUTPATIENT)
Dept: FAMILY MEDICINE CLINIC | Facility: CLINIC | Age: 60
End: 2020-09-08

## 2020-09-08 NOTE — TELEPHONE ENCOUNTER
Pt requested a change in medication from gabapentin (NEURONTIN) 100 MG capsule [[id number]] (Order [id number])    To something else saying that medication gives him headaches and doesn't do enough for him to take it.    Pt also requested that his T-shot be given every ten days instead of 2 weeks.  He says it wears off by then and leaves him sluggish.      Pt wants a callback when something is decided.

## 2020-09-10 ENCOUNTER — LAB (OUTPATIENT)
Dept: FAMILY MEDICINE CLINIC | Facility: CLINIC | Age: 60
End: 2020-09-10

## 2020-09-10 DIAGNOSIS — E29.1 HYPOGONADISM, MALE: Primary | ICD-10-CM

## 2020-09-10 PROCEDURE — 84403 ASSAY OF TOTAL TESTOSTERONE: CPT | Performed by: NURSE PRACTITIONER

## 2020-09-10 PROCEDURE — 36415 COLL VENOUS BLD VENIPUNCTURE: CPT | Performed by: NURSE PRACTITIONER

## 2020-09-10 RX ORDER — INDOMETHACIN 50 MG/1
50 CAPSULE ORAL 2 TIMES DAILY PRN
Qty: 60 CAPSULE | Refills: 0 | Status: SHIPPED | OUTPATIENT
Start: 2020-09-10 | End: 2020-12-17

## 2020-09-10 NOTE — TELEPHONE ENCOUNTER
Stop gabapentin d/t side effect. Hold ibuprofen and try indomethacin BID prn, I sent to pharmacy. He needs to come in for testosterone level 10 days after the injection, will see where his level is at that time before deciding to change frequency in shots.

## 2020-09-10 NOTE — TELEPHONE ENCOUNTER
Called patient with instructions and he voiced understanding.  He states his last testosterone shot was 8/31/20 so he is going to stop by today to check his current testosterone level.  cc

## 2020-09-11 LAB — TESTOST SERPL-MCNC: 238 NG/DL (ref 193–740)

## 2020-09-21 ENCOUNTER — CLINICAL SUPPORT (OUTPATIENT)
Dept: FAMILY MEDICINE CLINIC | Facility: CLINIC | Age: 60
End: 2020-09-21

## 2020-09-21 DIAGNOSIS — E29.1 HYPOGONADISM, MALE: Primary | ICD-10-CM

## 2020-09-21 PROCEDURE — 96372 THER/PROPH/DIAG INJ SC/IM: CPT | Performed by: NURSE PRACTITIONER

## 2020-09-21 RX ADMIN — TESTOSTERONE CYPIONATE 200 MG: 200 INJECTION, SOLUTION INTRAMUSCULAR at 13:02

## 2020-10-01 ENCOUNTER — CLINICAL SUPPORT (OUTPATIENT)
Dept: FAMILY MEDICINE CLINIC | Facility: CLINIC | Age: 60
End: 2020-10-01

## 2020-10-01 DIAGNOSIS — E29.1 HYPOGONADISM, MALE: ICD-10-CM

## 2020-10-01 PROCEDURE — 96372 THER/PROPH/DIAG INJ SC/IM: CPT | Performed by: NURSE PRACTITIONER

## 2020-10-01 RX ADMIN — TESTOSTERONE CYPIONATE 200 MG: 200 INJECTION, SOLUTION INTRAMUSCULAR at 11:48

## 2020-10-05 ENCOUNTER — OFFICE VISIT (OUTPATIENT)
Dept: FAMILY MEDICINE CLINIC | Facility: CLINIC | Age: 60
End: 2020-10-05

## 2020-10-05 VITALS
HEIGHT: 68 IN | BODY MASS INDEX: 29.1 KG/M2 | OXYGEN SATURATION: 97 % | TEMPERATURE: 98.2 F | DIASTOLIC BLOOD PRESSURE: 79 MMHG | HEART RATE: 57 BPM | SYSTOLIC BLOOD PRESSURE: 136 MMHG | WEIGHT: 192 LBS

## 2020-10-05 DIAGNOSIS — L82.1 SEBORRHEIC KERATOSES: Primary | ICD-10-CM

## 2020-10-05 DIAGNOSIS — D49.2 ABNORMAL SKIN GROWTH: ICD-10-CM

## 2020-10-05 PROCEDURE — 99213 OFFICE O/P EST LOW 20 MIN: CPT | Performed by: NURSE PRACTITIONER

## 2020-10-05 NOTE — PROGRESS NOTES
Chief Complaint   Patient presents with   • Suspicious Skin Lesion     pt isn't sure of when last colonoscopy, refused flu vax       HPI     Pt with a spot on left forehead last 2 mo, brown and itching at times. Also a spot on the left shoulder discolored and irritated, abnormal borders, no drainage or erythema      The following portions of the patient's history were reviewed and updated as appropriate: allergies, current medications, past family history, past medical history, past social history, past surgical history and problem list.    Past Medical History:   Diagnosis Date   • Headache    • Hx of hypogonadism    • Hyperlipidemia    • Hypertension      Past Surgical History:   Procedure Laterality Date   • OTHER SURGICAL HISTORY  2012    TIA- [place]     Family History   Problem Relation Age of Onset   • Cancer Mother      Social History     Tobacco Use   • Smoking status: Former Smoker   • Smokeless tobacco: Never Used   • Tobacco comment: quit x30 years   Substance Use Topics   • Alcohol use: No     Frequency: Never         Current Outpatient Medications:   •  lisinopril (PRINIVIL,ZESTRIL) 20 MG tablet, Take 1 tablet by mouth Daily., Disp: 90 tablet, Rfl: 1  •  Testosterone Cypionate (Depo-Testosterone) 200 MG/ML injection, Inject 1 mL into the appropriate muscle as directed by prescriber Every 14 (Fourteen) Days., Disp: 10 mL, Rfl: 0  •  verapamil ER (VERELAN) 240 MG 24 hr capsule, TAKE ONE CAPSULE BY MOUTH EVERY NIGHT, Disp: 90 capsule, Rfl: 0  •  diclofenac (VOLTAREN) 1 % gel gel, Apply 4 g topically to the appropriate area as directed 4 (Four) Times a Day As Needed (for left arm tendinitis pain)., Disp: 150 g, Rfl: 0  •  hydrOXYzine (ATARAX) 10 MG tablet, Take 1 tablet by mouth 3 (Three) Times a Day As Needed for Anxiety., Disp: 30 tablet, Rfl: 0  •  indomethacin (INDOCIN) 50 MG capsule, Take 1 capsule by mouth 2 (Two) Times a Day As Needed for Moderate Pain ., Disp: 60 capsule, Rfl: 0    Current  "Facility-Administered Medications:   •  Testosterone Cypionate (DEPOTESTOTERONE CYPIONATE) injection 200 mg, 200 mg, Intramuscular, Q14 Days, Rina Colindres, APRN, 200 mg at 10/01/20 1148      Review of Systems       Obtained as mentioned in HPI, otherwise negative.       Vitals:    10/05/20 1315   BP: 136/79   BP Location: Left arm   Patient Position: Sitting   Cuff Size: Adult   Pulse: 57   Temp: 98.2 °F (36.8 °C)   TempSrc: Skin   SpO2: 97%   Weight: 87.1 kg (192 lb)   Height: 172.7 cm (67.99\")     Body mass index is 29.2 kg/m².    Physical Exam  Vitals signs and nursing note reviewed.   Constitutional:       General: He is not in acute distress.     Appearance: He is well-developed. He is not diaphoretic.   HENT:      Head: Normocephalic and atraumatic.   Eyes:      Pupils: Pupils are equal, round, and reactive to light.   Neck:      Musculoskeletal: Normal range of motion.      Thyroid: No thyromegaly.   Cardiovascular:      Rate and Rhythm: Normal rate and regular rhythm.      Heart sounds: Normal heart sounds. No murmur.   Pulmonary:      Effort: Pulmonary effort is normal. No respiratory distress.      Breath sounds: Normal breath sounds.   Abdominal:      General: Bowel sounds are normal. There is no distension.      Palpations: Abdomen is soft.      Tenderness: There is no abdominal tenderness.   Musculoskeletal: Normal range of motion.   Skin:     General: Skin is warm and dry.      Findings: Lesion (x2 small SK left forehead, left shoulder abnormal nevus., abnormal abcde) present. No erythema.   Neurological:      Mental Status: He is alert and oriented to person, place, and time.   Psychiatric:         Behavior: Behavior normal.         Thought Content: Thought content normal.         Judgment: Judgment normal.         No visits with results within 7 Day(s) from this visit.   Latest known visit with results is:   Orders Only on 09/10/2020   Component Date Value Ref Range Status   • Testosterone, Total " 09/10/2020 238.00  193.00 - 740.00 ng/dL Final       Problem List Items Addressed This Visit     None      Visit Diagnoses     Seborrheic keratoses    -  Primary    facial and several on the body, referral to derm for full body skin check.     Relevant Orders    Ambulatory Referral to Dermatology    Abnormal skin growth        Relevant Orders    Ambulatory Referral to Dermatology         Return for Next scheduled follow up, schedule labs 1 wk prior to appt.

## 2020-10-13 ENCOUNTER — CLINICAL SUPPORT (OUTPATIENT)
Dept: FAMILY MEDICINE CLINIC | Facility: CLINIC | Age: 60
End: 2020-10-13

## 2020-10-13 PROCEDURE — 96372 THER/PROPH/DIAG INJ SC/IM: CPT | Performed by: NURSE PRACTITIONER

## 2020-10-13 RX ADMIN — TESTOSTERONE CYPIONATE 200 MG: 200 INJECTION, SOLUTION INTRAMUSCULAR at 14:28

## 2020-10-27 ENCOUNTER — CLINICAL SUPPORT (OUTPATIENT)
Dept: FAMILY MEDICINE CLINIC | Facility: CLINIC | Age: 60
End: 2020-10-27

## 2020-10-27 DIAGNOSIS — E29.1 HYPOGONADISM, MALE: ICD-10-CM

## 2020-10-27 PROCEDURE — 96372 THER/PROPH/DIAG INJ SC/IM: CPT | Performed by: NURSE PRACTITIONER

## 2020-10-27 RX ORDER — TESTOSTERONE CYPIONATE 200 MG/ML
200 INJECTION, SOLUTION INTRAMUSCULAR
Qty: 10 ML | Refills: 0 | Status: SHIPPED | OUTPATIENT
Start: 2020-10-27 | End: 2021-06-22 | Stop reason: SDUPTHER

## 2020-10-27 RX ORDER — VERAPAMIL HYDROCHLORIDE 240 MG/1
240 CAPSULE, EXTENDED RELEASE ORAL NIGHTLY
Qty: 90 CAPSULE | Refills: 1 | Status: SHIPPED | OUTPATIENT
Start: 2020-10-27 | End: 2021-05-25 | Stop reason: SDUPTHER

## 2020-10-27 RX ADMIN — TESTOSTERONE CYPIONATE 200 MG: 200 INJECTION, SOLUTION INTRAMUSCULAR at 11:51

## 2020-11-18 ENCOUNTER — CLINICAL SUPPORT (OUTPATIENT)
Dept: FAMILY MEDICINE CLINIC | Facility: CLINIC | Age: 60
End: 2020-11-18

## 2020-11-18 DIAGNOSIS — E29.1 HYPOGONADISM, MALE: ICD-10-CM

## 2020-11-18 PROCEDURE — 96372 THER/PROPH/DIAG INJ SC/IM: CPT | Performed by: NURSE PRACTITIONER

## 2020-11-18 RX ADMIN — TESTOSTERONE CYPIONATE 200 MG: 200 INJECTION, SOLUTION INTRAMUSCULAR at 13:52

## 2020-12-07 ENCOUNTER — CLINICAL SUPPORT (OUTPATIENT)
Dept: FAMILY MEDICINE CLINIC | Facility: CLINIC | Age: 60
End: 2020-12-07

## 2020-12-07 DIAGNOSIS — E29.1 HYPOGONADISM, MALE: ICD-10-CM

## 2020-12-07 PROCEDURE — 96372 THER/PROPH/DIAG INJ SC/IM: CPT | Performed by: NURSE PRACTITIONER

## 2020-12-07 RX ADMIN — TESTOSTERONE CYPIONATE 200 MG: 200 INJECTION, SOLUTION INTRAMUSCULAR at 11:48

## 2020-12-17 ENCOUNTER — OFFICE VISIT (OUTPATIENT)
Dept: FAMILY MEDICINE CLINIC | Facility: CLINIC | Age: 60
End: 2020-12-17

## 2020-12-17 VITALS
BODY MASS INDEX: 29.92 KG/M2 | DIASTOLIC BLOOD PRESSURE: 117 MMHG | OXYGEN SATURATION: 99 % | WEIGHT: 197.4 LBS | SYSTOLIC BLOOD PRESSURE: 198 MMHG | HEIGHT: 68 IN | TEMPERATURE: 97.8 F | HEART RATE: 66 BPM

## 2020-12-17 DIAGNOSIS — E78.49 OTHER HYPERLIPIDEMIA: ICD-10-CM

## 2020-12-17 DIAGNOSIS — Z00.00 PREVENTATIVE HEALTH CARE: ICD-10-CM

## 2020-12-17 DIAGNOSIS — R53.83 FATIGUE, UNSPECIFIED TYPE: ICD-10-CM

## 2020-12-17 DIAGNOSIS — J30.2 SEASONAL ALLERGIC RHINITIS, UNSPECIFIED TRIGGER: ICD-10-CM

## 2020-12-17 DIAGNOSIS — R07.89 OTHER CHEST PAIN: ICD-10-CM

## 2020-12-17 DIAGNOSIS — F41.9 ANXIETY: ICD-10-CM

## 2020-12-17 DIAGNOSIS — E29.1 HYPOGONADISM, MALE: Primary | ICD-10-CM

## 2020-12-17 DIAGNOSIS — I10 ESSENTIAL HYPERTENSION: ICD-10-CM

## 2020-12-17 PROCEDURE — 99214 OFFICE O/P EST MOD 30 MIN: CPT | Performed by: NURSE PRACTITIONER

## 2020-12-17 PROCEDURE — 86803 HEPATITIS C AB TEST: CPT | Performed by: NURSE PRACTITIONER

## 2020-12-17 PROCEDURE — 84443 ASSAY THYROID STIM HORMONE: CPT | Performed by: NURSE PRACTITIONER

## 2020-12-17 PROCEDURE — 84484 ASSAY OF TROPONIN QUANT: CPT | Performed by: NURSE PRACTITIONER

## 2020-12-17 PROCEDURE — 85027 COMPLETE CBC AUTOMATED: CPT | Performed by: NURSE PRACTITIONER

## 2020-12-17 PROCEDURE — 80061 LIPID PANEL: CPT | Performed by: NURSE PRACTITIONER

## 2020-12-17 PROCEDURE — 82306 VITAMIN D 25 HYDROXY: CPT | Performed by: NURSE PRACTITIONER

## 2020-12-17 PROCEDURE — 80053 COMPREHEN METABOLIC PANEL: CPT | Performed by: NURSE PRACTITIONER

## 2020-12-17 PROCEDURE — 84403 ASSAY OF TOTAL TESTOSTERONE: CPT | Performed by: NURSE PRACTITIONER

## 2020-12-17 PROCEDURE — 82607 VITAMIN B-12: CPT | Performed by: NURSE PRACTITIONER

## 2020-12-17 RX ORDER — LISINOPRIL 20 MG/1
20 TABLET ORAL DAILY
Qty: 90 TABLET | Refills: 1 | Status: SHIPPED | OUTPATIENT
Start: 2020-12-17 | End: 2020-12-17

## 2020-12-17 RX ORDER — LISINOPRIL 40 MG/1
40 TABLET ORAL DAILY
Qty: 90 TABLET | Refills: 1 | Status: SHIPPED | OUTPATIENT
Start: 2020-12-17 | End: 2021-05-25 | Stop reason: SDUPTHER

## 2020-12-17 NOTE — PROGRESS NOTES
Chief Complaint   Patient presents with   • Hypogonadism   • Follow-up   • Chest Pain     left side of chest, shooting pains, only happened once a few days ago with ataxia (stumbling)       HPI    HTN, bp elevated, repeat per manual cuff today 180/90, pt feels stable on meds and takes as directed, pt does report left sided chest pain intermittently after carrying a ladder-he also reports some tenderness with palpation on the chest wall, lasted on/off 2-3 days, but now resolved for the last 5 days. He denies headache, shortness of air, palpitations and swelling of extremities.      Hypgonadism, receiving testosterone inj q10 days, fatigue improved but still present, reports anxiety/depression.     Hyperlipidemia, taking fishoil daily, the patient denies muscle aches, constipation, diarrhea, GI upset, fatigue, chest pain/pressure, exercise intolerance, dyspnea, palpitations, syncope and pedal edema.           The following portions of the patient's history were reviewed and updated as appropriate: allergies, current medications, past family history, past medical history, past social history, past surgical history and problem list.    Past Medical History:   Diagnosis Date   • Headache    • Hx of hypogonadism    • Hyperlipidemia    • Hypertension      Past Surgical History:   Procedure Laterality Date   • OTHER SURGICAL HISTORY  2012    TIA- [facility]     Family History   Problem Relation Age of Onset   • Cancer Mother      Social History     Tobacco Use   • Smoking status: Former Smoker   • Smokeless tobacco: Never Used   • Tobacco comment: quit x30 years   Substance Use Topics   • Alcohol use: No     Frequency: Never         Current Outpatient Medications:   •  lisinopril (PRINIVIL,ZESTRIL) 40 MG tablet, Take 1 tablet by mouth Daily., Disp: 90 tablet, Rfl: 1  •  Testosterone Cypionate (Depo-Testosterone) 200 MG/ML injection, Inject 1 mL into the appropriate muscle as directed by prescriber Every 14 (Fourteen)  "Days., Disp: 10 mL, Rfl: 0  •  verapamil ER (VERELAN) 240 MG 24 hr capsule, Take 1 capsule by mouth Every Night., Disp: 90 capsule, Rfl: 1  •  diclofenac (VOLTAREN) 1 % gel gel, Apply 4 g topically to the appropriate area as directed 4 (Four) Times a Day As Needed (for left arm tendinitis pain)., Disp: 150 g, Rfl: 0    Current Facility-Administered Medications:   •  Testosterone Cypionate (DEPOTESTOTERONE CYPIONATE) injection 200 mg, 200 mg, Intramuscular, Q14 Days, Rina Colindres, APRN, 200 mg at 12/07/20 1148      Review of Systems     Obtained as mentioned in HPI, otherwise negative.       Vitals:    12/17/20 1322 12/17/20 1327   BP: 178/98 (!) 198/117   BP Location: Right arm Left arm   Patient Position: Sitting Sitting   Cuff Size: Adult Adult   Pulse: 66    Temp: 97.8 °F (36.6 °C)    TempSrc: Skin    SpO2: 99%    Weight: 89.5 kg (197 lb 6.4 oz)    Height: 172.7 cm (67.99\")      Body mass index is 30.02 kg/m².    Physical Exam  Vitals signs and nursing note reviewed.   Constitutional:       General: He is not in acute distress.     Appearance: He is well-developed. He is not diaphoretic.   HENT:      Head: Normocephalic and atraumatic.   Eyes:      Pupils: Pupils are equal, round, and reactive to light.   Neck:      Musculoskeletal: Normal range of motion.      Thyroid: No thyromegaly.   Cardiovascular:      Rate and Rhythm: Normal rate and regular rhythm.      Heart sounds: Normal heart sounds. No murmur.   Pulmonary:      Effort: Pulmonary effort is normal. No respiratory distress.      Breath sounds: Normal breath sounds.   Abdominal:      General: Bowel sounds are normal. There is no distension.      Palpations: Abdomen is soft.      Tenderness: There is no abdominal tenderness.   Musculoskeletal: Normal range of motion.   Skin:     General: Skin is warm and dry.      Findings: No erythema.   Neurological:      Mental Status: He is alert and oriented to person, place, and time.   Psychiatric:         " Behavior: Behavior normal.         Thought Content: Thought content normal.         Judgment: Judgment normal.         Office Visit on 12/17/2020   Component Date Value Ref Range Status   • Total Cholesterol 12/17/2020 204* 0 - 200 mg/dL Final   • Triglycerides 12/17/2020 166* 0 - 150 mg/dL Final   • HDL Cholesterol 12/17/2020 41  40 - 60 mg/dL Final   • LDL Cholesterol  12/17/2020 133* 0 - 100 mg/dL Final   • VLDL Cholesterol 12/17/2020 30  5 - 40 mg/dL Final   • LDL/HDL Ratio 12/17/2020 3.17   Final   • Glucose 12/17/2020 94  65 - 99 mg/dL Final   • BUN 12/17/2020 16  8 - 23 mg/dL Final   • Creatinine 12/17/2020 1.04  0.76 - 1.27 mg/dL Final   • Sodium 12/17/2020 142  136 - 145 mmol/L Final   • Potassium 12/17/2020 4.5  3.5 - 5.2 mmol/L Final   • Chloride 12/17/2020 106  98 - 107 mmol/L Final   • CO2 12/17/2020 25.5  22.0 - 29.0 mmol/L Final   • Calcium 12/17/2020 9.5  8.6 - 10.5 mg/dL Final   • Total Protein 12/17/2020 7.2  6.0 - 8.5 g/dL Final   • Albumin 12/17/2020 4.60  3.50 - 5.20 g/dL Final   • ALT (SGPT) 12/17/2020 28  1 - 41 U/L Final   • AST (SGOT) 12/17/2020 22  1 - 40 U/L Final   • Alkaline Phosphatase 12/17/2020 57  39 - 117 U/L Final   • Total Bilirubin 12/17/2020 0.2  0.0 - 1.2 mg/dL Final   • eGFR Non African Amer 12/17/2020 72  >60 mL/min/1.73 Final   • Globulin 12/17/2020 2.6  gm/dL Final   • A/G Ratio 12/17/2020 1.8  g/dL Final   • BUN/Creatinine Ratio 12/17/2020 15.4  7.0 - 25.0 Final   • Anion Gap 12/17/2020 10.5  5.0 - 15.0 mmol/L Final   • WBC 12/17/2020 5.94  3.40 - 10.80 10*3/mm3 Final   • RBC 12/17/2020 5.45  4.14 - 5.80 10*6/mm3 Final   • Hemoglobin 12/17/2020 16.1  13.0 - 17.7 g/dL Final   • Hematocrit 12/17/2020 48.4  37.5 - 51.0 % Final   • MCV 12/17/2020 88.8  79.0 - 97.0 fL Final   • MCH 12/17/2020 29.5  26.6 - 33.0 pg Final   • MCHC 12/17/2020 33.3  31.5 - 35.7 g/dL Final   • RDW 12/17/2020 12.7  12.3 - 15.4 % Final   • RDW-SD 12/17/2020 40.6  37.0 - 54.0 fl Final   • MPV 12/17/2020  10.5  6.0 - 12.0 fL Final   • Platelets 12/17/2020 265  140 - 450 10*3/mm3 Final   • TSH 12/17/2020 3.340  0.270 - 4.200 uIU/mL Final   • Testosterone, Total 12/17/2020 211.00  193.00 - 740.00 ng/dL Final   • Vitamin B-12 12/17/2020 1,298* 211 - 946 pg/mL Final   • 25 Hydroxy, Vitamin D 12/17/2020 37.4  30.0 - 100.0 ng/ml Final   • Hepatitis C Ab 12/17/2020 Non-Reactive  Non-Reactive Final   • Troponin T 12/17/2020 <0.010  0.000 - 0.030 ng/mL Final       Diagnoses and all orders for this visit:    1. Hypogonadism, male (Primary)  -     Testosterone    2. Essential hypertension  -     Comprehensive Metabolic Panel  -     CBC (No Diff)  -     TSH  -     Vitamin B12  -     Vitamin D 25 Hydroxy  -     Troponin    3. Other hyperlipidemia  -     Lipid Panel    4. Anxiety    5. Fatigue, unspecified type  -     Vitamin B12  -     Vitamin D 25 Hydroxy    6. Preventative health care  -     Hepatitis C Antibody    7. Other chest pain  -     Troponin    8. Seasonal allergic rhinitis, unspecified trigger  Comments:  try flonase at night for left blocked nasal passage. pt has seen ENT, had allergy testing    Other orders  -     Discontinue: lisinopril (PRINIVIL,ZESTRIL) 20 MG tablet; Take 1 tablet by mouth Daily.  Dispense: 90 tablet; Refill: 1  -     lisinopril (PRINIVIL,ZESTRIL) 40 MG tablet; Take 1 tablet by mouth Daily.  Dispense: 90 tablet; Refill: 1       1. Check labs as ordered, will notify pt results.   2. Increase lisinopril, check bp at home, notify if >150 sbp. Adjust prn  3. Adjust T if needed  4. Try flonase for blocked left nasal passage at night.   5.  Return in about 6 months (around 6/17/2021) for htn, low T.

## 2020-12-18 LAB
25(OH)D3 SERPL-MCNC: 37.4 NG/ML (ref 30–100)
ALBUMIN SERPL-MCNC: 4.6 G/DL (ref 3.5–5.2)
ALBUMIN/GLOB SERPL: 1.8 G/DL
ALP SERPL-CCNC: 57 U/L (ref 39–117)
ALT SERPL W P-5'-P-CCNC: 28 U/L (ref 1–41)
ANION GAP SERPL CALCULATED.3IONS-SCNC: 10.5 MMOL/L (ref 5–15)
AST SERPL-CCNC: 22 U/L (ref 1–40)
BILIRUB SERPL-MCNC: 0.2 MG/DL (ref 0–1.2)
BUN SERPL-MCNC: 16 MG/DL (ref 8–23)
BUN/CREAT SERPL: 15.4 (ref 7–25)
CALCIUM SPEC-SCNC: 9.5 MG/DL (ref 8.6–10.5)
CHLORIDE SERPL-SCNC: 106 MMOL/L (ref 98–107)
CHOLEST SERPL-MCNC: 204 MG/DL (ref 0–200)
CO2 SERPL-SCNC: 25.5 MMOL/L (ref 22–29)
CREAT SERPL-MCNC: 1.04 MG/DL (ref 0.76–1.27)
DEPRECATED RDW RBC AUTO: 40.6 FL (ref 37–54)
ERYTHROCYTE [DISTWIDTH] IN BLOOD BY AUTOMATED COUNT: 12.7 % (ref 12.3–15.4)
GFR SERPL CREATININE-BSD FRML MDRD: 72 ML/MIN/1.73
GLOBULIN UR ELPH-MCNC: 2.6 GM/DL
GLUCOSE SERPL-MCNC: 94 MG/DL (ref 65–99)
HCT VFR BLD AUTO: 48.4 % (ref 37.5–51)
HCV AB SER DONR QL: NORMAL
HDLC SERPL-MCNC: 41 MG/DL (ref 40–60)
HGB BLD-MCNC: 16.1 G/DL (ref 13–17.7)
LDLC SERPL CALC-MCNC: 133 MG/DL (ref 0–100)
LDLC/HDLC SERPL: 3.17 {RATIO}
MCH RBC QN AUTO: 29.5 PG (ref 26.6–33)
MCHC RBC AUTO-ENTMCNC: 33.3 G/DL (ref 31.5–35.7)
MCV RBC AUTO: 88.8 FL (ref 79–97)
PLATELET # BLD AUTO: 265 10*3/MM3 (ref 140–450)
PMV BLD AUTO: 10.5 FL (ref 6–12)
POTASSIUM SERPL-SCNC: 4.5 MMOL/L (ref 3.5–5.2)
PROT SERPL-MCNC: 7.2 G/DL (ref 6–8.5)
RBC # BLD AUTO: 5.45 10*6/MM3 (ref 4.14–5.8)
SODIUM SERPL-SCNC: 142 MMOL/L (ref 136–145)
TESTOST SERPL-MCNC: 211 NG/DL (ref 193–740)
TRIGL SERPL-MCNC: 166 MG/DL (ref 0–150)
TROPONIN T SERPL-MCNC: <0.01 NG/ML (ref 0–0.03)
TSH SERPL DL<=0.05 MIU/L-ACNC: 3.34 UIU/ML (ref 0.27–4.2)
VIT B12 BLD-MCNC: 1298 PG/ML (ref 211–946)
VLDLC SERPL-MCNC: 30 MG/DL (ref 5–40)
WBC # BLD AUTO: 5.94 10*3/MM3 (ref 3.4–10.8)

## 2020-12-30 ENCOUNTER — CLINICAL SUPPORT (OUTPATIENT)
Dept: FAMILY MEDICINE CLINIC | Facility: CLINIC | Age: 60
End: 2020-12-30

## 2020-12-30 PROCEDURE — 96372 THER/PROPH/DIAG INJ SC/IM: CPT | Performed by: NURSE PRACTITIONER

## 2020-12-30 RX ADMIN — TESTOSTERONE CYPIONATE 200 MG: 200 INJECTION, SOLUTION INTRAMUSCULAR at 11:55

## 2021-01-05 ENCOUNTER — TELEPHONE (OUTPATIENT)
Dept: FAMILY MEDICINE CLINIC | Facility: CLINIC | Age: 61
End: 2021-01-05

## 2021-01-05 RX ORDER — FLUTICASONE PROPIONATE 50 MCG
2 SPRAY, SUSPENSION (ML) NASAL DAILY
Qty: 11.1 ML | Refills: 3 | Status: SHIPPED | OUTPATIENT
Start: 2021-01-05 | End: 2023-04-05

## 2021-01-05 NOTE — TELEPHONE ENCOUNTER
Patient stopped by the office and wanted to know if you would send in a prescription for Fluticasone Propionate Nasal Prospect 50 mcg to [facility].  He stated that he found his old prescription and used it and it opened his nasal passages right up and he could breath easier.  Thanks.  cc

## 2021-01-15 ENCOUNTER — CLINICAL SUPPORT (OUTPATIENT)
Dept: FAMILY MEDICINE CLINIC | Facility: CLINIC | Age: 61
End: 2021-01-15

## 2021-01-15 DIAGNOSIS — E29.1 HYPOGONADISM, MALE: ICD-10-CM

## 2021-01-15 PROCEDURE — 96372 THER/PROPH/DIAG INJ SC/IM: CPT | Performed by: NURSE PRACTITIONER

## 2021-01-15 RX ADMIN — TESTOSTERONE CYPIONATE 200 MG: 200 INJECTION, SOLUTION INTRAMUSCULAR at 12:18

## 2021-01-25 ENCOUNTER — CLINICAL SUPPORT (OUTPATIENT)
Dept: FAMILY MEDICINE CLINIC | Facility: CLINIC | Age: 61
End: 2021-01-25

## 2021-01-25 DIAGNOSIS — E29.1 HYPOGONADISM, MALE: ICD-10-CM

## 2021-01-25 PROCEDURE — 96372 THER/PROPH/DIAG INJ SC/IM: CPT | Performed by: NURSE PRACTITIONER

## 2021-01-25 RX ADMIN — TESTOSTERONE CYPIONATE 200 MG: 200 INJECTION, SOLUTION INTRAMUSCULAR at 15:59

## 2021-02-15 ENCOUNTER — TELEPHONE (OUTPATIENT)
Dept: FAMILY MEDICINE CLINIC | Facility: CLINIC | Age: 61
End: 2021-02-15

## 2021-02-15 NOTE — TELEPHONE ENCOUNTER
Patient called stating that he is currently [place]of[place] ([place] for the next few months) but would like a prescription sent to [facility] for syringes/ needles so that he can be given his testosterone shot (q14d).  He usually comes to the office for this and is behind.  Thanks.  cc

## 2021-05-25 RX ORDER — LISINOPRIL 40 MG/1
40 TABLET ORAL DAILY
Qty: 90 TABLET | Refills: 1 | Status: SHIPPED | OUTPATIENT
Start: 2021-05-25 | End: 2021-11-10 | Stop reason: SDUPTHER

## 2021-05-25 RX ORDER — VERAPAMIL HYDROCHLORIDE 240 MG/1
240 CAPSULE, EXTENDED RELEASE ORAL NIGHTLY
Qty: 90 CAPSULE | Refills: 1 | Status: SHIPPED | OUTPATIENT
Start: 2021-05-25 | End: 2021-11-10 | Stop reason: SDUPTHER

## 2021-05-25 RX ORDER — VERAPAMIL HYDROCHLORIDE 240 MG/1
CAPSULE, EXTENDED RELEASE ORAL
Qty: 90 CAPSULE | Refills: 0 | OUTPATIENT
Start: 2021-05-25

## 2021-05-25 NOTE — TELEPHONE ENCOUNTER
Pt calling to check on RF for this medication.  He states that he is taking his last one tonight.  Wants to know if there are any side effects to not taking it for a week or two?  He is in [place] and has a friend who picks it up at his pharmacy and mails it to him.  Thanks. cc

## 2021-05-25 NOTE — TELEPHONE ENCOUNTER
No he needs to take every day, [place] will [place] supply [place] tomorrow, have him provide the number and address to where he wants it sent

## 2021-05-25 NOTE — TELEPHONE ENCOUNTER
Appears the pharmacy on file is actually [facility] [place] [facility], I am sending the prescription tonight

## 2021-06-02 ENCOUNTER — TELEPHONE (OUTPATIENT)
Dept: FAMILY MEDICINE CLINIC | Facility: CLINIC | Age: 61
End: 2021-06-02

## 2021-06-02 NOTE — TELEPHONE ENCOUNTER
HUB TO SHARE - Left message for patient to call the office and r/s 6/17/2021 appt due to Rina being out of the office that afternoon.  Can either schedule to the morning the same day or a different day completely.  cc

## 2021-06-22 DIAGNOSIS — E29.1 HYPOGONADISM, MALE: ICD-10-CM

## 2021-06-22 RX ORDER — TESTOSTERONE CYPIONATE 200 MG/ML
200 INJECTION, SOLUTION INTRAMUSCULAR
Qty: 10 ML | Refills: 0 | Status: SHIPPED | OUTPATIENT
Start: 2021-06-22 | End: 2021-11-15 | Stop reason: SDUPTHER

## 2021-11-10 ENCOUNTER — OFFICE VISIT (OUTPATIENT)
Dept: FAMILY MEDICINE CLINIC | Facility: CLINIC | Age: 61
End: 2021-11-10

## 2021-11-10 VITALS
DIASTOLIC BLOOD PRESSURE: 80 MMHG | HEIGHT: 68 IN | HEART RATE: 66 BPM | OXYGEN SATURATION: 99 % | SYSTOLIC BLOOD PRESSURE: 159 MMHG | BODY MASS INDEX: 28.55 KG/M2 | WEIGHT: 188.4 LBS

## 2021-11-10 DIAGNOSIS — M79.672 LEFT FOOT PAIN: ICD-10-CM

## 2021-11-10 DIAGNOSIS — E78.49 OTHER HYPERLIPIDEMIA: ICD-10-CM

## 2021-11-10 DIAGNOSIS — Z00.00 PREVENTATIVE HEALTH CARE: ICD-10-CM

## 2021-11-10 DIAGNOSIS — F41.9 ANXIETY: ICD-10-CM

## 2021-11-10 DIAGNOSIS — Z12.5 PROSTATE CANCER SCREENING: ICD-10-CM

## 2021-11-10 DIAGNOSIS — I10 ESSENTIAL HYPERTENSION: ICD-10-CM

## 2021-11-10 DIAGNOSIS — G47.09 OTHER INSOMNIA: ICD-10-CM

## 2021-11-10 DIAGNOSIS — E29.1 HYPOGONADISM, MALE: ICD-10-CM

## 2021-11-10 DIAGNOSIS — K21.9 GASTROESOPHAGEAL REFLUX DISEASE WITHOUT ESOPHAGITIS: Primary | ICD-10-CM

## 2021-11-10 PROBLEM — R51.9 CHRONIC DAILY HEADACHE: Status: ACTIVE | Noted: 2020-03-06

## 2021-11-10 LAB — HBA1C MFR BLD: 5.3 % (ref 3.5–5.6)

## 2021-11-10 PROCEDURE — 96372 THER/PROPH/DIAG INJ SC/IM: CPT | Performed by: NURSE PRACTITIONER

## 2021-11-10 PROCEDURE — 83036 HEMOGLOBIN GLYCOSYLATED A1C: CPT | Performed by: NURSE PRACTITIONER

## 2021-11-10 PROCEDURE — G0103 PSA SCREENING: HCPCS | Performed by: NURSE PRACTITIONER

## 2021-11-10 PROCEDURE — 80061 LIPID PANEL: CPT | Performed by: NURSE PRACTITIONER

## 2021-11-10 PROCEDURE — 84403 ASSAY OF TOTAL TESTOSTERONE: CPT | Performed by: NURSE PRACTITIONER

## 2021-11-10 PROCEDURE — 80050 GENERAL HEALTH PANEL: CPT | Performed by: NURSE PRACTITIONER

## 2021-11-10 PROCEDURE — 99214 OFFICE O/P EST MOD 30 MIN: CPT | Performed by: NURSE PRACTITIONER

## 2021-11-10 RX ORDER — OMEPRAZOLE 40 MG/1
40 CAPSULE, DELAYED RELEASE ORAL NIGHTLY
Qty: 90 CAPSULE | Refills: 1 | Status: SHIPPED | OUTPATIENT
Start: 2021-11-10 | End: 2022-05-10 | Stop reason: SDUPTHER

## 2021-11-10 RX ORDER — LISINOPRIL 40 MG/1
40 TABLET ORAL DAILY
Qty: 90 TABLET | Refills: 1 | Status: SHIPPED | OUTPATIENT
Start: 2021-11-10 | End: 2022-05-10 | Stop reason: SDUPTHER

## 2021-11-10 RX ORDER — METOPROLOL SUCCINATE 25 MG/1
25 TABLET, EXTENDED RELEASE ORAL DAILY
Qty: 90 TABLET | Refills: 0 | Status: SHIPPED | OUTPATIENT
Start: 2021-11-10 | End: 2022-05-10 | Stop reason: SDUPTHER

## 2021-11-10 RX ORDER — HYDROXYZINE HYDROCHLORIDE 10 MG/1
10-20 TABLET, FILM COATED ORAL NIGHTLY PRN
Qty: 40 TABLET | Refills: 0 | Status: SHIPPED | OUTPATIENT
Start: 2021-11-10 | End: 2022-05-10 | Stop reason: SDUPTHER

## 2021-11-10 RX ORDER — VERAPAMIL HYDROCHLORIDE 240 MG/1
240 CAPSULE, EXTENDED RELEASE ORAL NIGHTLY
Qty: 90 CAPSULE | Refills: 1 | Status: SHIPPED | OUTPATIENT
Start: 2021-11-10 | End: 2022-05-10 | Stop reason: SDUPTHER

## 2021-11-10 RX ADMIN — TESTOSTERONE CYPIONATE 200 MG: 200 INJECTION, SOLUTION INTRAMUSCULAR at 12:29

## 2021-11-10 NOTE — PROGRESS NOTES
"Chief Complaint  Annual Exam, Med Refill (testosterone and lisinopril), Hypertension (pt would like to discuss changing his lisinopril to amlodipine), Dizziness (with headaches, pt reports that he wonders if r/t stress.  pt reports he has been very stressed and would like a note that i'll let him explain to you related to a court case.), and Abdominal Pain (pt reports chest pain, but when he motioned toward the pain, it was epigastric area)    Subjective          Jaron Hassan Angelique presents to [facility] for   History of Present Illness     Patient is here today for annual exam    HTN, BP is elevated, 170/80 last night, he does report headaches, dizziness, increased stress and chest pain/midepigastric pain. Takes medications as directed, denies chest pain, shortness of air, palpitations and swelling of extremities.     Midepigastric abdominal pain, worse after meals and exacerbated with stress lately and when lying down.      Hypogonadism on testosterone IM every 14 days, he is at 2 weeks and reports fatigue, which is also exacerbated due to insomnia    Anxiety, patient reports insomnia, increased stress, fatigue. Pt denies significant weight loss/gain, hypersomnia, psychomotor agitation, psychomotor retardation, feelings of worthlessness (guilt), impaired concentration (indecisiveness), thoughts of death or suicide.       Pt reports pain in L foot ambulation, \"feels like the bones are shifting\"       The following portions of the patient's history were reviewed and updated as appropriate: allergies, current medications, past family history, past medical history, past social history, past surgical history and problem list.    Past Medical History:   Diagnosis Date   • Headache    • Hx of hypogonadism    • Hyperlipidemia    • Hypertension      Past Surgical History:   Procedure Laterality Date   • OTHER SURGICAL HISTORY  2012    TIA- [place] [facility]     Family History   Problem Relation " "Age of Onset   • Cancer Mother      Social History     Tobacco Use   • Smoking status: Former Smoker     Packs/day: 0.50     Years: 15.00     Pack years: 7.50     Types: Cigarettes     Quit date:      Years since quittin.8   • Smokeless tobacco: Never Used   • Tobacco comment: quit x30 years   Substance Use Topics   • Alcohol use: No       Current Outpatient Medications:   •  diclofenac (VOLTAREN) 1 % gel gel, Apply 4 g topically to the appropriate area as directed 4 (Four) Times a Day As Needed (for left arm tendinitis pain)., Disp: 150 g, Rfl: 0  •  fluticasone (Flonase) 50 MCG/ACT nasal spray, 2 sprays into the nostril(s) as directed by provider Daily., Disp: 11.1 mL, Rfl: 3  •  lisinopril (PRINIVIL,ZESTRIL) 40 MG tablet, Take 1 tablet by mouth Daily., Disp: 90 tablet, Rfl: 1  •  Syringe, Disposable, 3 ML misc, 1 mL Every 14 (Fourteen) Days., Disp: 2 each, Rfl: 4  •  Syringe/Needle, Disp, 23G X 1\" 3 ML misc, Use to inject testosterone Q14 days, Disp: 12 each, Rfl: 0  •  verapamil ER (VERELAN) 240 MG 24 hr capsule, Take 1 capsule by mouth Every Night., Disp: 90 capsule, Rfl: 1  •  hydrOXYzine (ATARAX) 10 MG tablet, Take 1-2 tablets by mouth At Night As Needed for Anxiety (insomnia)., Disp: 40 tablet, Rfl: 0  •  metoprolol succinate XL (Toprol XL) 25 MG 24 hr tablet, Take 1 tablet by mouth Daily., Disp: 90 tablet, Rfl: 0  •  omeprazole (priLOSEC) 40 MG capsule, Take 1 capsule by mouth Every Night. For GERD, Disp: 90 capsule, Rfl: 1  •  Testosterone Cypionate (Depo-Testosterone) 200 MG/ML injection, Inject 1 mL into the appropriate muscle as directed by prescriber Every 14 (Fourteen) Days., Disp: 10 mL, Rfl: 0    Current Facility-Administered Medications:   •  Testosterone Cypionate (DEPOTESTOTERONE CYPIONATE) injection 200 mg, 200 mg, Intramuscular, Q14 Days, Rina Colindres, APRN, 200 mg at 21 1559    Objective   Vital Signs:   /80 (BP Location: Left arm, Patient Position: Sitting, Cuff Size: " "Large Adult)   Pulse 66   Ht 172.7 cm (67.99\")   Wt 85.5 kg (188 lb 6.4 oz)   SpO2 99%   BMI 28.65 kg/m²         Physical Exam  Vitals and nursing note reviewed.   Constitutional:       General: He is not in acute distress.     Appearance: He is well-developed. He is not diaphoretic.   HENT:      Head: Normocephalic and atraumatic.   Eyes:      Pupils: Pupils are equal, round, and reactive to light.   Neck:      Thyroid: No thyromegaly.   Cardiovascular:      Rate and Rhythm: Normal rate and regular rhythm.      Heart sounds: Normal heart sounds. No murmur heard.      Pulmonary:      Effort: Pulmonary effort is normal. No respiratory distress.      Breath sounds: Normal breath sounds.   Abdominal:      General: Bowel sounds are normal. There is no distension.      Palpations: Abdomen is soft.      Tenderness: There is no abdominal tenderness.   Musculoskeletal:         General: Normal range of motion.      Cervical back: Normal range of motion.   Skin:     General: Skin is warm and dry.      Findings: No erythema.   Neurological:      Mental Status: He is alert and oriented to person, place, and time.   Psychiatric:         Behavior: Behavior normal.         Thought Content: Thought content normal.         Judgment: Judgment normal.          Result Review :{ Labs  Result Review  Imaging  Med Tab  Media :23}     No visits with results within 7 Day(s) from this visit.   Latest known visit with results is:   Office Visit on 12/17/2020   Component Date Value Ref Range Status   • Total Cholesterol 12/17/2020 204* 0 - 200 mg/dL Final   • Triglycerides 12/17/2020 166* 0 - 150 mg/dL Final   • HDL Cholesterol 12/17/2020 41  40 - 60 mg/dL Final   • LDL Cholesterol  12/17/2020 133* 0 - 100 mg/dL Final   • VLDL Cholesterol 12/17/2020 30  5 - 40 mg/dL Final   • LDL/HDL Ratio 12/17/2020 3.17   Final   • Glucose 12/17/2020 94  65 - 99 mg/dL Final   • BUN 12/17/2020 16  8 - 23 mg/dL Final   • Creatinine 12/17/2020 1.04  " 0.76 - 1.27 mg/dL Final   • Sodium 12/17/2020 142  136 - 145 mmol/L Final   • Potassium 12/17/2020 4.5  3.5 - 5.2 mmol/L Final   • Chloride 12/17/2020 106  98 - 107 mmol/L Final   • CO2 12/17/2020 25.5  22.0 - 29.0 mmol/L Final   • Calcium 12/17/2020 9.5  8.6 - 10.5 mg/dL Final   • Total Protein 12/17/2020 7.2  6.0 - 8.5 g/dL Final   • Albumin 12/17/2020 4.60  3.50 - 5.20 g/dL Final   • ALT (SGPT) 12/17/2020 28  1 - 41 U/L Final   • AST (SGOT) 12/17/2020 22  1 - 40 U/L Final   • Alkaline Phosphatase 12/17/2020 57  39 - 117 U/L Final   • Total Bilirubin 12/17/2020 0.2  0.0 - 1.2 mg/dL Final   • eGFR Non African Amer 12/17/2020 72  >60 mL/min/1.73 Final   • Globulin 12/17/2020 2.6  gm/dL Final   • A/G Ratio 12/17/2020 1.8  g/dL Final   • BUN/Creatinine Ratio 12/17/2020 15.4  7.0 - 25.0 Final   • Anion Gap 12/17/2020 10.5  5.0 - 15.0 mmol/L Final   • WBC 12/17/2020 5.94  3.40 - 10.80 10*3/mm3 Final   • RBC 12/17/2020 5.45  4.14 - 5.80 10*6/mm3 Final   • Hemoglobin 12/17/2020 16.1  13.0 - 17.7 g/dL Final   • Hematocrit 12/17/2020 48.4  37.5 - 51.0 % Final   • MCV 12/17/2020 88.8  79.0 - 97.0 fL Final   • MCH 12/17/2020 29.5  26.6 - 33.0 pg Final   • MCHC 12/17/2020 33.3  31.5 - 35.7 g/dL Final   • RDW 12/17/2020 12.7  12.3 - 15.4 % Final   • RDW-SD 12/17/2020 40.6  37.0 - 54.0 fl Final   • MPV 12/17/2020 10.5  6.0 - 12.0 fL Final   • Platelets 12/17/2020 265  140 - 450 10*3/mm3 Final   • TSH 12/17/2020 3.340  0.270 - 4.200 uIU/mL Final   • Testosterone, Total 12/17/2020 211.00  193.00 - 740.00 ng/dL Final   • Vitamin B-12 12/17/2020 1,298* 211 - 946 pg/mL Final   • 25 Hydroxy, Vitamin D 12/17/2020 37.4  30.0 - 100.0 ng/ml Final   • Hepatitis C Ab 12/17/2020 Non-Reactive  Non-Reactive Final   • Troponin T 12/17/2020 <0.010  0.000 - 0.030 ng/mL Final                       Assessment and Plan    Diagnoses and all orders for this visit:    1. Gastroesophageal reflux disease without esophagitis (Primary)  Comments:  start  omeprazole nightly    2. Anxiety  Comments:  trial hydroxyzine prn    3. Hypogonadism, male  Comments:  due for testosterone IM today, check T today if w/in range will continue Q2 weeks.   Orders:  -     Testosterone    4. Other hyperlipidemia  Comments:  labs today as ordered will notify results.   Orders:  -     Lipid Panel    5. Preventative health care  Comments:  labs as ordered, will notify results.   rec shingrix  all other immuns utd  Orders:  -     Hemoglobin A1c    6. Prostate cancer screening  -     PSA Screen    7. Other insomnia  Comments:  likely 2/2 anxiety/stress, trial hydroxyzine at night    8. Essential hypertension  Comments:  elevated, likely d/t anxiety/stress. add toprol, cont verapamil and lisinopril.   Orders:  -     Comprehensive Metabolic Panel  -     CBC (No Diff)  -     TSH  -     metoprolol succinate XL (Toprol XL) 25 MG 24 hr tablet; Take 1 tablet by mouth Daily.  Dispense: 90 tablet; Refill: 0  -     lisinopril (PRINIVIL,ZESTRIL) 40 MG tablet; Take 1 tablet by mouth Daily.  Dispense: 90 tablet; Refill: 1  -     verapamil ER (VERELAN) 240 MG 24 hr capsule; Take 1 capsule by mouth Every Night.  Dispense: 90 capsule; Refill: 1    9. Left foot pain  -     Ambulatory Referral to Podiatry    Other orders  -     hydrOXYzine (ATARAX) 10 MG tablet; Take 1-2 tablets by mouth At Night As Needed for Anxiety (insomnia).  Dispense: 40 tablet; Refill: 0  -     omeprazole (priLOSEC) 40 MG capsule; Take 1 capsule by mouth Every Night. For GERD  Dispense: 90 capsule; Refill: 1      -pt to check bp at home, notify if >160 or <110 consistently  -Age appropriate preventative counseling provided, including healthy lifestyle modifications and exercise  -pt has legal issue and rec he d/w , no letter from me at this time is recommended.     I spent 30 minutes caring for Jaron Hassan Angelique on this date of service. This time includes time spent by me in the following activities: preparing for the  visit, reviewing tests, performing a medically appropriate examination and/or evaluation , counseling and educating the patient/family/caregiver, ordering medications, tests, or procedures and documenting information in the medical record        Follow Up     Return in about 6 months (around 5/10/2022) for HTN, lipids.  Patient was given instructions and counseling regarding his condition or for health maintenance advice. Please see specific information pulled into the AVS if appropriate.      EMR Dragon transcription disclaimer:  Some of this encounter note is an electronic transcription translation of spoken language to printed text. The electronic translation of spoken language may permit erroneous, or at times, nonsensical words or phrases to be inadvertently transcribed; Although I have reviewed the note for such errors some may still exist.

## 2021-11-11 LAB
ALBUMIN SERPL-MCNC: 4.8 G/DL (ref 3.5–5.2)
ALBUMIN/GLOB SERPL: 1.7 G/DL
ALP SERPL-CCNC: 63 U/L (ref 39–117)
ALT SERPL W P-5'-P-CCNC: 25 U/L (ref 1–41)
ANION GAP SERPL CALCULATED.3IONS-SCNC: 9.7 MMOL/L (ref 5–15)
AST SERPL-CCNC: 21 U/L (ref 1–40)
BILIRUB SERPL-MCNC: 0.7 MG/DL (ref 0–1.2)
BUN SERPL-MCNC: 17 MG/DL (ref 8–23)
BUN/CREAT SERPL: 17.3 (ref 7–25)
CALCIUM SPEC-SCNC: 9.7 MG/DL (ref 8.6–10.5)
CHLORIDE SERPL-SCNC: 103 MMOL/L (ref 98–107)
CHOLEST SERPL-MCNC: 234 MG/DL (ref 0–200)
CO2 SERPL-SCNC: 25.3 MMOL/L (ref 22–29)
CREAT SERPL-MCNC: 0.98 MG/DL (ref 0.76–1.27)
DEPRECATED RDW RBC AUTO: 42.7 FL (ref 37–54)
ERYTHROCYTE [DISTWIDTH] IN BLOOD BY AUTOMATED COUNT: 12.8 % (ref 12.3–15.4)
GFR SERPL CREATININE-BSD FRML MDRD: 77 ML/MIN/1.73
GLOBULIN UR ELPH-MCNC: 2.8 GM/DL
GLUCOSE SERPL-MCNC: 86 MG/DL (ref 65–99)
HCT VFR BLD AUTO: 50.7 % (ref 37.5–51)
HDLC SERPL-MCNC: 36 MG/DL (ref 40–60)
HGB BLD-MCNC: 16.2 G/DL (ref 13–17.7)
LDLC SERPL CALC-MCNC: 155 MG/DL (ref 0–100)
LDLC/HDLC SERPL: 4.21 {RATIO}
MCH RBC QN AUTO: 29.1 PG (ref 26.6–33)
MCHC RBC AUTO-ENTMCNC: 32 G/DL (ref 31.5–35.7)
MCV RBC AUTO: 91.2 FL (ref 79–97)
PLATELET # BLD AUTO: 292 10*3/MM3 (ref 140–450)
PMV BLD AUTO: 10.4 FL (ref 6–12)
POTASSIUM SERPL-SCNC: 4.3 MMOL/L (ref 3.5–5.2)
PROT SERPL-MCNC: 7.6 G/DL (ref 6–8.5)
PSA SERPL-MCNC: 1.72 NG/ML (ref 0–4)
RBC # BLD AUTO: 5.56 10*6/MM3 (ref 4.14–5.8)
SODIUM SERPL-SCNC: 138 MMOL/L (ref 136–145)
TESTOST SERPL-MCNC: 166 NG/DL (ref 193–740)
TRIGL SERPL-MCNC: 232 MG/DL (ref 0–150)
TSH SERPL DL<=0.05 MIU/L-ACNC: 2.88 UIU/ML (ref 0.27–4.2)
VLDLC SERPL-MCNC: 43 MG/DL (ref 5–40)
WBC # BLD AUTO: 8.4 10*3/MM3 (ref 3.4–10.8)

## 2021-11-15 ENCOUNTER — TELEPHONE (OUTPATIENT)
Dept: FAMILY MEDICINE CLINIC | Facility: CLINIC | Age: 61
End: 2021-11-15

## 2021-11-15 DIAGNOSIS — E29.1 HYPOGONADISM, MALE: ICD-10-CM

## 2021-11-15 RX ORDER — AZITHROMYCIN 250 MG/1
TABLET, FILM COATED ORAL
Qty: 6 TABLET | Refills: 0 | Status: SHIPPED | OUTPATIENT
Start: 2021-11-15 | End: 2021-11-29

## 2021-11-15 RX ORDER — TESTOSTERONE CYPIONATE 200 MG/ML
200 INJECTION, SOLUTION INTRAMUSCULAR
Qty: 10 ML | Refills: 0 | Status: SHIPPED | OUTPATIENT
Start: 2021-11-15 | End: 2022-03-01

## 2021-11-15 NOTE — TELEPHONE ENCOUNTER
"Hub ok to share:    \"Z-Tae sent to pharmacy, rec otc mucinex DM as well, push fluids. Lab results:  Lipids are elevated, should improve diet, limit fatty, fried, greasy foods and increase lean meats, increase water and exercise 30 min at least 3 days/week. -Testosterone is low, but due for injection at time of lab draw so that is the reason it was a bit low.\"      "

## 2021-11-15 NOTE — TELEPHONE ENCOUNTER
Rx Refill Note  Requested Prescriptions      No prescriptions requested or ordered in this encounter      Last office visit with prescribing clinician: 11/10/2021      Next office visit with prescribing clinician: 5/10/2022            Komal Medina MA  11/15/21, 14:15 EST

## 2021-11-15 NOTE — TELEPHONE ENCOUNTER
"Caller: Jaron Merino    Relationship to patient: Self    Best call back number: [phone]    Patient is needing: PATIENT CALLED BACK AND I RELAYED \"HUB TO READ\" MESSAGE. PATIENT STATED THAT HE IS OUT OF TESTOSTERONE SHOTS AND WILL NEED A REFILL SENT IN. PATIENT ALSO HAS ADDITIONAL QUESTIONS ABOUT LAB RESULTS, BUT WOULD LIKE A CALL BACK TOMORROW, NOT TODAY.         "

## 2021-11-15 NOTE — TELEPHONE ENCOUNTER
Caller: Jaron Merino    Relationship: Self    Best call back number: [phone]    What medication are you requesting: N/A    What are your current symptoms: COUGH, GREEN MUCUS AND SORE THROAT     How long have you been experiencing symptoms: 11/11    Have you had these symptoms before:    [] Yes  [x] No    Have you been treated for these symptoms before:   [] Yes  [x] No    If a prescription is needed, what is your preferred pharmacy and phone number:      [facility] [place] - [address] [place] [address] - [phone]  - [phone]

## 2021-11-23 DIAGNOSIS — I10 ESSENTIAL HYPERTENSION: ICD-10-CM

## 2021-11-23 RX ORDER — VERAPAMIL HYDROCHLORIDE 240 MG/1
240 CAPSULE, EXTENDED RELEASE ORAL NIGHTLY
Qty: 90 CAPSULE | Refills: 1 | OUTPATIENT
Start: 2021-11-23

## 2021-11-23 RX ORDER — VERAPAMIL HYDROCHLORIDE 240 MG/1
TABLET, FILM COATED, EXTENDED RELEASE ORAL
Qty: 90 TABLET | OUTPATIENT
Start: 2021-11-23

## 2021-11-23 RX ORDER — LISINOPRIL 40 MG/1
TABLET ORAL
Qty: 90 TABLET | Refills: 1 | OUTPATIENT
Start: 2021-11-23

## 2021-11-29 ENCOUNTER — OFFICE VISIT (OUTPATIENT)
Dept: FAMILY MEDICINE CLINIC | Facility: CLINIC | Age: 61
End: 2021-11-29

## 2021-11-29 VITALS
TEMPERATURE: 98.2 F | OXYGEN SATURATION: 97 % | RESPIRATION RATE: 18 BRPM | SYSTOLIC BLOOD PRESSURE: 143 MMHG | WEIGHT: 193.6 LBS | BODY MASS INDEX: 29.34 KG/M2 | HEIGHT: 68 IN | HEART RATE: 65 BPM | DIASTOLIC BLOOD PRESSURE: 76 MMHG

## 2021-11-29 DIAGNOSIS — R59.0 LYMPHADENOPATHY, CERVICAL: Primary | ICD-10-CM

## 2021-11-29 DIAGNOSIS — E29.1 HYPOGONADISM, MALE: ICD-10-CM

## 2021-11-29 DIAGNOSIS — R07.9 CHEST PAIN, UNSPECIFIED TYPE: ICD-10-CM

## 2021-11-29 LAB
EXPIRATION DATE: NORMAL
INTERNAL CONTROL: NORMAL
Lab: NORMAL
S PYO AG THROAT QL: NEGATIVE

## 2021-11-29 PROCEDURE — 86664 EPSTEIN-BARR NUCLEAR ANTIGEN: CPT | Performed by: NURSE PRACTITIONER

## 2021-11-29 PROCEDURE — 99214 OFFICE O/P EST MOD 30 MIN: CPT | Performed by: NURSE PRACTITIONER

## 2021-11-29 PROCEDURE — 93000 ELECTROCARDIOGRAM COMPLETE: CPT | Performed by: NURSE PRACTITIONER

## 2021-11-29 PROCEDURE — 84484 ASSAY OF TROPONIN QUANT: CPT | Performed by: NURSE PRACTITIONER

## 2021-11-29 PROCEDURE — 86665 EPSTEIN-BARR CAPSID VCA: CPT | Performed by: NURSE PRACTITIONER

## 2021-11-29 PROCEDURE — 87880 STREP A ASSAY W/OPTIC: CPT | Performed by: NURSE PRACTITIONER

## 2021-11-29 RX ORDER — PREDNISONE 10 MG/1
TABLET ORAL
Qty: 33 TABLET | Refills: 0 | Status: SHIPPED | OUTPATIENT
Start: 2021-11-29 | End: 2022-05-10

## 2021-11-29 NOTE — PROGRESS NOTES
Chief Complaint  Adenopathy (under the jaw line on both the left and right sides)    Subjective          Jaron Merino presents to [facility] for   History of Present Illness    Pt was started on zpack on 11/15 for cough, green mucus and sore throat, mostly resolved with minor sinus pressure/congestion. He presents today for evaluation of cervical lymphadenopathy, sore throat, fatigue/exhaustion with minimal activity has slept nearly 20 hours/day for the last 2 days.  He reports diaphoresis, joint pain, and also developed 1 episode of chest pain, occurred when taking groceries in the house, up the stairs 2 days ago.        The following portions of the patient's history were reviewed and updated as appropriate: allergies, current medications, past family history, past medical history, past social history, past surgical history and problem list.    Past Medical History:   Diagnosis Date   • Headache    • Hx of hypogonadism    • Hyperlipidemia    • Hypertension      Past Surgical History:   Procedure Laterality Date   • OTHER SURGICAL HISTORY      TIA- [facility]     Family History   Problem Relation Age of Onset   • Cancer Mother      Social History     Tobacco Use   • Smoking status: Former Smoker     Packs/day: 0.50     Years: 15.00     Pack years: 7.50     Types: Cigarettes     Quit date:      Years since quittin.9   • Smokeless tobacco: Never Used   • Tobacco comment: quit x30 years   Substance Use Topics   • Alcohol use: No       Current Outpatient Medications:   •  diclofenac (VOLTAREN) 1 % gel gel, Apply 4 g topically to the appropriate area as directed 4 (Four) Times a Day As Needed (for left arm tendinitis pain)., Disp: 150 g, Rfl: 0  •  fluticasone (Flonase) 50 MCG/ACT nasal spray, 2 sprays into the nostril(s) as directed by provider Daily., Disp: 11.1 mL, Rfl: 3  •  hydrOXYzine (ATARAX) 10 MG tablet, Take 1-2 tablets by mouth At Night As Needed for Anxiety  "(insomnia)., Disp: 40 tablet, Rfl: 0  •  lisinopril (PRINIVIL,ZESTRIL) 40 MG tablet, Take 1 tablet by mouth Daily., Disp: 90 tablet, Rfl: 1  •  metoprolol succinate XL (Toprol XL) 25 MG 24 hr tablet, Take 1 tablet by mouth Daily., Disp: 90 tablet, Rfl: 0  •  omeprazole (priLOSEC) 40 MG capsule, Take 1 capsule by mouth Every Night. For GERD, Disp: 90 capsule, Rfl: 1  •  Syringe, Disposable, 3 ML misc, 1 mL Every 14 (Fourteen) Days., Disp: 2 each, Rfl: 4  •  Syringe/Needle, Disp, 23G X 1\" 3 ML misc, Use to inject testosterone Q14 days, Disp: 12 each, Rfl: 0  •  Testosterone Cypionate (Depo-Testosterone) 200 MG/ML injection, Inject 1 mL into the appropriate muscle as directed by prescriber Every 14 (Fourteen) Days., Disp: 10 mL, Rfl: 0  •  verapamil ER (VERELAN) 240 MG 24 hr capsule, Take 1 capsule by mouth Every Night., Disp: 90 capsule, Rfl: 1  •  predniSONE (DELTASONE) 10 MG tablet, Take 5 po for 2 days, Take 4 for 2 days, then 3 for 2 days, then 2 for 2 days, then 1 for 5 days, then stop, Disp: 33 tablet, Rfl: 0    Current Facility-Administered Medications:   •  Testosterone Cypionate (DEPOTESTOTERONE CYPIONATE) injection 200 mg, 200 mg, Intramuscular, Q14 Days, Rina Colindres, APRN, 200 mg at 11/10/21 1229    Objective   Vital Signs:   /76 (BP Location: Left arm, Patient Position: Sitting, Cuff Size: Adult)   Pulse 65   Temp 98.2 °F (36.8 °C) (Oral)   Resp 18   Ht 172.7 cm (67.99\")   Wt 87.8 kg (193 lb 9.6 oz)   SpO2 97%   BMI 29.45 kg/m²       Physical Exam  Vitals and nursing note reviewed.   Constitutional:       General: He is not in acute distress.     Appearance: He is well-developed. He is ill-appearing. He is not diaphoretic.   HENT:      Head: Normocephalic and atraumatic.      Right Ear: Tympanic membrane normal.      Left Ear: Tympanic membrane normal.      Nose: Rhinorrhea present. No congestion.      Mouth/Throat:      Pharynx: Posterior oropharyngeal erythema present.   Eyes:      " Pupils: Pupils are equal, round, and reactive to light.   Neck:      Thyroid: No thyromegaly.   Cardiovascular:      Rate and Rhythm: Normal rate and regular rhythm.      Heart sounds: Murmur (GII/VI) heard.       Pulmonary:      Effort: Pulmonary effort is normal. No respiratory distress.      Breath sounds: Normal breath sounds.   Abdominal:      General: Bowel sounds are normal. There is no distension.      Palpations: Abdomen is soft.      Tenderness: There is no abdominal tenderness.   Musculoskeletal:         General: Normal range of motion.      Cervical back: Normal range of motion. Tenderness present. No rigidity.   Lymphadenopathy:      Cervical: Cervical adenopathy (anterior cervical and submandibular enlargement and ttp) present.   Skin:     General: Skin is warm and dry.      Findings: No erythema.   Neurological:      Mental Status: He is alert and oriented to person, place, and time.   Psychiatric:         Behavior: Behavior normal.         Thought Content: Thought content normal.         Judgment: Judgment normal.          Result Review :     Office Visit on 11/29/2021   Component Date Value Ref Range Status   • Rapid Strep A Screen 11/29/2021 Negative  Negative, VALID, INVALID, Not Performed Final   • Internal Control 11/29/2021 Passed  Passed Final   • Lot Number 11/29/2021 [id number]   Final   • Expiration Date 11/29/2021 03/31/2022   Final                       Assessment and Plan    Diagnoses and all orders for this visit:    1. Lymphadenopathy, cervical (Primary)  Comments:  RSS neg, check cbc and EBV igg/igm, start pred taper.  Rest, push fluids Tylenol/ibuprofen as needed    Orders:  -     EBV Antibody Profile  -     POCT rapid strep A  -     CBC & Differential    2. Chest pain, unspecified type  Comments:  EKG and troponin today. cp resolved 2 days ago, only occurred while taking groceries. consider stress test, present to ED for worsening/recurrence.   Orders:  -     Troponin  -     ECG 12  Lead  -     CBC & Differential    3. Hypogonadism, male  Comments:  Hold testosterone IM today until troponin has resulted, if normal patient may return tomorrow for testosterone injection    Other orders  -     predniSONE (DELTASONE) 10 MG tablet; Take 5 po for 2 days, Take 4 for 2 days, then 3 for 2 days, then 2 for 2 days, then 1 for 5 days, then stop  Dispense: 33 tablet; Refill: 0      EKG shows SR with 1st degree av block      I spent 32 minutes caring for Jaron Meirno on this date of service. This time includes time spent by me in the following activities: preparing for the visit, reviewing tests, performing a medically appropriate examination and/or evaluation , counseling and educating the patient/family/caregiver, ordering medications, tests, or procedures and documenting information in the medical record        Follow Up     Return if symptoms worsen or fail to improve.  Patient was given instructions and counseling regarding his condition or for health maintenance advice. Please see specific information pulled into the AVS if appropriate.      EMR Dragon transcription disclaimer:  Some of this encounter note is an electronic transcription translation of spoken language to printed text. The electronic translation of spoken language may permit erroneous, or at times, nonsensical words or phrases to be inadvertently transcribed; Although I have reviewed the note for such errors some may still exist.

## 2021-11-30 ENCOUNTER — TELEPHONE (OUTPATIENT)
Dept: FAMILY MEDICINE CLINIC | Facility: CLINIC | Age: 61
End: 2021-11-30

## 2021-11-30 ENCOUNTER — CLINICAL SUPPORT (OUTPATIENT)
Dept: FAMILY MEDICINE CLINIC | Facility: CLINIC | Age: 61
End: 2021-11-30

## 2021-11-30 DIAGNOSIS — E55.9 VITAMIN D DEFICIENCY: Primary | ICD-10-CM

## 2021-11-30 DIAGNOSIS — R53.83 FATIGUE, UNSPECIFIED TYPE: ICD-10-CM

## 2021-11-30 DIAGNOSIS — R79.89 LOW TESTOSTERONE: ICD-10-CM

## 2021-11-30 LAB
EBV NA IGG SER IA-ACNC: 189 U/ML (ref 0–17.9)
EBV VCA IGG SER IA-ACNC: 74.3 U/ML (ref 0–17.9)
EBV VCA IGM SER IA-ACNC: <36 U/ML (ref 0–35.9)
SERVICE CMNT-IMP: ABNORMAL
TROPONIN T SERPL-MCNC: <0.01 NG/ML (ref 0–0.03)

## 2021-11-30 PROCEDURE — 96372 THER/PROPH/DIAG INJ SC/IM: CPT | Performed by: NURSE PRACTITIONER

## 2021-11-30 RX ADMIN — TESTOSTERONE CYPIONATE 200 MG: 200 INJECTION, SOLUTION INTRAMUSCULAR at 12:01

## 2021-11-30 NOTE — PROGRESS NOTES
Injection  Injection performed in left dorsogluteal by Komal Medina MA. Patient tolerated the procedure well without complications.  11/30/21   Komal Medina MA

## 2021-12-08 ENCOUNTER — TELEPHONE (OUTPATIENT)
Dept: FAMILY MEDICINE CLINIC | Facility: CLINIC | Age: 61
End: 2021-12-08

## 2021-12-08 NOTE — TELEPHONE ENCOUNTER
----- Message from MERARI Chung sent at 12/8/2021  3:18 PM EST -----  That is likely the lymph node that is still enlarged, if it persists for more than 4 weeks then we will check an ultrasound

## 2021-12-08 NOTE — TELEPHONE ENCOUNTER
"Hub ok to share:    \"The pain under the jaw is likely the lymph node that is still enlarged, if it persists for more than 4 weeks then we will check an ultrasound\"  "

## 2021-12-28 ENCOUNTER — CLINICAL SUPPORT (OUTPATIENT)
Dept: FAMILY MEDICINE CLINIC | Facility: CLINIC | Age: 61
End: 2021-12-28
Payer: MEDICAID

## 2021-12-28 DIAGNOSIS — R53.83 FATIGUE, UNSPECIFIED TYPE: ICD-10-CM

## 2021-12-28 PROCEDURE — 82306 VITAMIN D 25 HYDROXY: CPT | Performed by: NURSE PRACTITIONER

## 2021-12-28 PROCEDURE — 82607 VITAMIN B-12: CPT | Performed by: NURSE PRACTITIONER

## 2021-12-28 PROCEDURE — 85025 COMPLETE CBC W/AUTO DIFF WBC: CPT | Performed by: NURSE PRACTITIONER

## 2021-12-28 PROCEDURE — 36415 COLL VENOUS BLD VENIPUNCTURE: CPT | Performed by: NURSE PRACTITIONER

## 2021-12-29 LAB
25(OH)D3 SERPL-MCNC: 31.6 NG/ML (ref 30–100)
BASOPHILS # BLD AUTO: 0.03 10*3/MM3 (ref 0–0.2)
BASOPHILS NFR BLD AUTO: 0.5 % (ref 0–1.5)
DEPRECATED RDW RBC AUTO: 41.2 FL (ref 37–54)
EOSINOPHIL # BLD AUTO: 0.07 10*3/MM3 (ref 0–0.4)
EOSINOPHIL NFR BLD AUTO: 1.1 % (ref 0.3–6.2)
ERYTHROCYTE [DISTWIDTH] IN BLOOD BY AUTOMATED COUNT: 12.6 % (ref 12.3–15.4)
HCT VFR BLD AUTO: 48.6 % (ref 37.5–51)
HGB BLD-MCNC: 16.1 G/DL (ref 13–17.7)
IMM GRANULOCYTES # BLD AUTO: 0.02 10*3/MM3 (ref 0–0.05)
IMM GRANULOCYTES NFR BLD AUTO: 0.3 % (ref 0–0.5)
LYMPHOCYTES # BLD AUTO: 2.61 10*3/MM3 (ref 0.7–3.1)
LYMPHOCYTES NFR BLD AUTO: 40.8 % (ref 19.6–45.3)
MCH RBC QN AUTO: 29.7 PG (ref 26.6–33)
MCHC RBC AUTO-ENTMCNC: 33.1 G/DL (ref 31.5–35.7)
MCV RBC AUTO: 89.7 FL (ref 79–97)
MONOCYTES # BLD AUTO: 0.5 10*3/MM3 (ref 0.1–0.9)
MONOCYTES NFR BLD AUTO: 7.8 % (ref 5–12)
NEUTROPHILS NFR BLD AUTO: 3.17 10*3/MM3 (ref 1.7–7)
NEUTROPHILS NFR BLD AUTO: 49.5 % (ref 42.7–76)
NRBC BLD AUTO-RTO: 0 /100 WBC (ref 0–0.2)
PLATELET # BLD AUTO: 296 10*3/MM3 (ref 140–450)
PMV BLD AUTO: 10.5 FL (ref 6–12)
RBC # BLD AUTO: 5.42 10*6/MM3 (ref 4.14–5.8)
VIT B12 BLD-MCNC: 664 PG/ML (ref 211–946)
WBC NRBC COR # BLD: 6.4 10*3/MM3 (ref 3.4–10.8)

## 2022-02-28 DIAGNOSIS — E29.1 HYPOGONADISM, MALE: ICD-10-CM

## 2022-02-28 RX ORDER — NEEDLES, FILTER 19GX1 1/2"
NEEDLE, DISPOSABLE MISCELLANEOUS
Qty: 12 EACH | Refills: 0 | Status: SHIPPED | OUTPATIENT
Start: 2022-02-28 | End: 2022-05-27 | Stop reason: SDUPTHER

## 2022-03-01 ENCOUNTER — TELEPHONE (OUTPATIENT)
Dept: FAMILY MEDICINE CLINIC | Facility: CLINIC | Age: 62
End: 2022-03-01

## 2022-03-01 RX ORDER — TESTOSTERONE CYPIONATE 200 MG/ML
INJECTION, SOLUTION INTRAMUSCULAR
Qty: 10 ML | Refills: 0 | Status: SHIPPED | OUTPATIENT
Start: 2022-03-01 | End: 2022-05-27 | Stop reason: SDUPTHER

## 2022-03-01 NOTE — TELEPHONE ENCOUNTER
Caller: Jaron Espinoza    Relationship: Self    Best call back number: [phone]    Caller requesting test results: PATIENT     What test was performed: BLOOD WORK     When was the test performed: 12/28/21    Where was the test performed: IN OFFICE     Additional notes:

## 2022-03-07 ENCOUNTER — TELEPHONE (OUTPATIENT)
Dept: FAMILY MEDICINE CLINIC | Facility: CLINIC | Age: 62
End: 2022-03-07

## 2022-03-07 NOTE — TELEPHONE ENCOUNTER
PATIENT STATES THAT THE PHARMACY DID NOT RECEIVE THIS MEDICATION REFILL REQUEST:  Testosterone Cypionate (DEPOTESTOTERONE CYPIONATE) 200 MG/ML injection [[id number]] (Order [id number])    [facility]: [facility] - [phone] [facility] [phone] FX    CALL BACK #: [phone]

## 2022-03-07 NOTE — TELEPHONE ENCOUNTER
PATIENT CALLED BACK-PHARMACY TOLD HIM THIS NEEDS A PRIOR AUTHORIZATION.    [facility] - [place], [place] - [address] [place] [address] DR - [phone]  - [phone]   [phone]

## 2022-05-10 ENCOUNTER — OFFICE VISIT (OUTPATIENT)
Dept: FAMILY MEDICINE CLINIC | Facility: CLINIC | Age: 62
End: 2022-05-10

## 2022-05-10 VITALS
DIASTOLIC BLOOD PRESSURE: 100 MMHG | OXYGEN SATURATION: 97 % | HEART RATE: 82 BPM | HEIGHT: 68 IN | BODY MASS INDEX: 27.89 KG/M2 | WEIGHT: 184 LBS | SYSTOLIC BLOOD PRESSURE: 170 MMHG

## 2022-05-10 DIAGNOSIS — K21.9 GASTROESOPHAGEAL REFLUX DISEASE WITHOUT ESOPHAGITIS: Primary | ICD-10-CM

## 2022-05-10 DIAGNOSIS — M54.42 ACUTE LEFT-SIDED LOW BACK PAIN WITH LEFT-SIDED SCIATICA: ICD-10-CM

## 2022-05-10 DIAGNOSIS — G89.29 CHRONIC PAIN OF LEFT KNEE: ICD-10-CM

## 2022-05-10 DIAGNOSIS — F41.9 ANXIETY: ICD-10-CM

## 2022-05-10 DIAGNOSIS — I10 ESSENTIAL HYPERTENSION: ICD-10-CM

## 2022-05-10 DIAGNOSIS — E29.1 HYPOGONADISM, MALE: ICD-10-CM

## 2022-05-10 DIAGNOSIS — E78.49 OTHER HYPERLIPIDEMIA: ICD-10-CM

## 2022-05-10 DIAGNOSIS — M25.562 CHRONIC PAIN OF LEFT KNEE: ICD-10-CM

## 2022-05-10 LAB
DEPRECATED RDW RBC AUTO: 42.4 FL (ref 37–54)
ERYTHROCYTE [DISTWIDTH] IN BLOOD BY AUTOMATED COUNT: 13.1 % (ref 12.3–15.4)
HCT VFR BLD AUTO: 50 % (ref 37.5–51)
HGB BLD-MCNC: 16.4 G/DL (ref 13–17.7)
MCH RBC QN AUTO: 29.1 PG (ref 26.6–33)
MCHC RBC AUTO-ENTMCNC: 32.8 G/DL (ref 31.5–35.7)
MCV RBC AUTO: 88.7 FL (ref 79–97)
PLATELET # BLD AUTO: 407 10*3/MM3 (ref 140–450)
PMV BLD AUTO: 10 FL (ref 6–12)
RBC # BLD AUTO: 5.64 10*6/MM3 (ref 4.14–5.8)
WBC NRBC COR # BLD: 8.82 10*3/MM3 (ref 3.4–10.8)

## 2022-05-10 PROCEDURE — 80050 GENERAL HEALTH PANEL: CPT | Performed by: NURSE PRACTITIONER

## 2022-05-10 PROCEDURE — 99214 OFFICE O/P EST MOD 30 MIN: CPT | Performed by: NURSE PRACTITIONER

## 2022-05-10 PROCEDURE — 80061 LIPID PANEL: CPT | Performed by: NURSE PRACTITIONER

## 2022-05-10 PROCEDURE — 84403 ASSAY OF TOTAL TESTOSTERONE: CPT | Performed by: NURSE PRACTITIONER

## 2022-05-10 RX ORDER — VERAPAMIL HYDROCHLORIDE 240 MG/1
240 CAPSULE, EXTENDED RELEASE ORAL NIGHTLY
Qty: 90 CAPSULE | Refills: 1 | Status: SHIPPED | OUTPATIENT
Start: 2022-05-10 | End: 2022-11-10 | Stop reason: SDUPTHER

## 2022-05-10 RX ORDER — HYDROXYZINE HYDROCHLORIDE 10 MG/1
10-20 TABLET, FILM COATED ORAL NIGHTLY PRN
Qty: 40 TABLET | Refills: 0 | Status: SHIPPED | OUTPATIENT
Start: 2022-05-10 | End: 2023-04-05

## 2022-05-10 RX ORDER — IBUPROFEN 800 MG/1
800 TABLET ORAL EVERY 8 HOURS PRN
Qty: 90 TABLET | Refills: 1 | Status: SHIPPED | OUTPATIENT
Start: 2022-05-10 | End: 2023-04-05

## 2022-05-10 RX ORDER — OMEPRAZOLE 40 MG/1
40 CAPSULE, DELAYED RELEASE ORAL NIGHTLY
Qty: 90 CAPSULE | Refills: 1 | Status: SHIPPED | OUTPATIENT
Start: 2022-05-10 | End: 2022-11-10 | Stop reason: SDUPTHER

## 2022-05-10 RX ORDER — METOPROLOL SUCCINATE 25 MG/1
25 TABLET, EXTENDED RELEASE ORAL DAILY
Qty: 90 TABLET | Refills: 0 | Status: SHIPPED | OUTPATIENT
Start: 2022-05-10 | End: 2022-11-10 | Stop reason: SDUPTHER

## 2022-05-10 RX ORDER — LISINOPRIL 40 MG/1
40 TABLET ORAL DAILY
Qty: 90 TABLET | Refills: 1 | Status: SHIPPED | OUTPATIENT
Start: 2022-05-10 | End: 2022-11-10 | Stop reason: SDUPTHER

## 2022-05-10 NOTE — PROGRESS NOTES
Chief Complaint  Chief Complaint   Patient presents with   • Hypertension   • Hyperlipidemia   • Follow-up     6 mo   • Med Refill     Needs testosterone and needles   • Back Pain     Left lower back, sometimes radiates down lt leg and groin.  Pt also reports knee swelling on lt side, errol when he sits.  When he walks it's not so bad.  Pt reports he started wearing a knee brace which helped some.  Pt reports it comes and goes.           Subjective          Jaron Espinoza presents to [facility] for   History of Present Illness       HTN, elevated today, has not had meds yet today. Denies chest pain, headache, shortness of air, palpitations and swelling of extremities.     Hyperlipidemia, The patient denies muscle aches, constipation, diarrhea, GI upset, fatigue, chest pain/pressure, exercise intolerance, dyspnea, palpitations, syncope and pedal edema. He got a new bike and is going to start riding.       Hypogonadism, on testosterone 200 mg injections every 2 weeks, denies fatigue. His last injection was ~1 week ago.     Back and knee pain as mentioned in CC, denies injury, new activity, is not exercising much.     GERD, stable on medication, denies nausea, vomiting, constipation. He does report having abdominal pain and diarrhea for 7-8 days when had popeyes chicken and seemed like the food was bad, s/s now resolved.         The following portions of the patient's history were reviewed and updated as appropriate: allergies, current medications, past family history, past medical history, past social history, past surgical history and problem list.    Past Medical History:   Diagnosis Date   • Headache    • Hx of hypogonadism    • Hyperlipidemia    • Hypertension      Past Surgical History:   Procedure Laterality Date   • OTHER SURGICAL HISTORY  2012    TIA- [facility]     Family History   Problem Relation Age of Onset   • Cancer Mother      Social History     Tobacco Use   •  "Smoking status: Former Smoker     Packs/day: 0.50     Years: 15.00     Pack years: 7.50     Types: Cigarettes     Quit date:      Years since quittin.3   • Smokeless tobacco: Never Used   • Tobacco comment: quit x30 years   Substance Use Topics   • Alcohol use: No       Current Outpatient Medications:   •  hydrOXYzine (ATARAX) 10 MG tablet, Take 1-2 tablets by mouth At Night As Needed for Anxiety (insomnia)., Disp: 40 tablet, Rfl: 0  •  lisinopril (PRINIVIL,ZESTRIL) 40 MG tablet, Take 1 tablet by mouth Daily., Disp: 90 tablet, Rfl: 1  •  metoprolol succinate XL (Toprol XL) 25 MG 24 hr tablet, Take 1 tablet by mouth Daily., Disp: 90 tablet, Rfl: 0  •  omeprazole (priLOSEC) 40 MG capsule, Take 1 capsule by mouth Every Night. For GERD, Disp: 90 capsule, Rfl: 1  •  Syringe, Disposable, 3 ML misc, 1 mL Every 14 (Fourteen) Days., Disp: 2 each, Rfl: 4  •  Syringe/Needle, Disp, (B-D INTEGRA SYRINGE) 23G X 1\" 3 ML misc, USE TO INJECT TESTOSTERONE ONCE EVERY 14 DAYS, Disp: 12 each, Rfl: 0  •  Testosterone Cypionate (DEPOTESTOTERONE CYPIONATE) 200 MG/ML injection, INJECT 1 ML INTRAMUSCULARLY EVERY 14 DAYS, Disp: 10 mL, Rfl: 0  •  verapamil ER (VERELAN) 240 MG 24 hr capsule, Take 1 capsule by mouth Every Night., Disp: 90 capsule, Rfl: 1  •  diclofenac (VOLTAREN) 1 % gel gel, Apply 4 g topically to the appropriate area as directed 4 (Four) Times a Day As Needed (for left arm tendinitis pain)., Disp: 150 g, Rfl: 0  •  fluticasone (Flonase) 50 MCG/ACT nasal spray, 2 sprays into the nostril(s) as directed by provider Daily., Disp: 11.1 mL, Rfl: 3  •  ibuprofen (ADVIL,MOTRIN) 800 MG tablet, Take 1 tablet by mouth Every 8 (Eight) Hours As Needed for Moderate Pain ., Disp: 90 tablet, Rfl: 1    Current Facility-Administered Medications:   •  Testosterone Cypionate (DEPOTESTOTERONE CYPIONATE) injection 200 mg, 200 mg, Intramuscular, Q14 Days, Rina Colindres, APRN, 200 mg at 21 1201    Objective   Vital Signs:   /100 " "(BP Location: Left arm, Patient Position: Sitting, Cuff Size: Adult)   Pulse 82   Ht 172.7 cm (67.99\")   Wt 83.5 kg (184 lb)   SpO2 97%   BMI 27.98 kg/m²           Physical Exam  Vitals and nursing note reviewed.   Constitutional:       General: He is not in acute distress.     Appearance: He is well-developed. He is not diaphoretic.   HENT:      Head: Normocephalic and atraumatic.   Eyes:      Pupils: Pupils are equal, round, and reactive to light.   Neck:      Thyroid: No thyromegaly.   Cardiovascular:      Rate and Rhythm: Normal rate and regular rhythm.      Heart sounds: Normal heart sounds. No murmur heard.  Pulmonary:      Effort: Pulmonary effort is normal. No respiratory distress.      Breath sounds: Normal breath sounds. No wheezing or rhonchi.   Abdominal:      General: Bowel sounds are normal. There is no distension.      Palpations: Abdomen is soft.      Tenderness: There is no abdominal tenderness.   Musculoskeletal:         General: Tenderness (L low back muscular ttp, good rom.  mild L knee tenderness, good rom) present. Normal range of motion.      Cervical back: Normal range of motion.   Skin:     General: Skin is warm and dry.      Findings: No erythema.   Neurological:      Mental Status: He is alert and oriented to person, place, and time.   Psychiatric:         Behavior: Behavior normal.         Thought Content: Thought content normal.         Judgment: Judgment normal.          Result Review :     No visits with results within 7 Day(s) from this visit.   Latest known visit with results is:   Orders Only on 11/30/2021   Component Date Value Ref Range Status   • 25 Hydroxy, Vitamin D 12/28/2021 31.6  30.0 - 100.0 ng/ml Final   • Vitamin B-12 12/28/2021 664  211 - 946 pg/mL Final                  BMI is >= 25 and < 30. (Overweight) The following options were offered after discussion: exercise counseling/recommendations and nutrition counseling/recommendations           Assessment and Plan  "   Diagnoses and all orders for this visit:    1. Gastroesophageal reflux disease without esophagitis (Primary)  Comments:  stable    2. Essential hypertension  Comments:  elevated, has not had meds, d/w pt to every am same time upon awakening. cont/refill metop, verapamil and lisinopril.   Orders:  -     Lipid Panel  -     Comprehensive Metabolic Panel  -     CBC (No Diff)  -     TSH  -     lisinopril (PRINIVIL,ZESTRIL) 40 MG tablet; Take 1 tablet by mouth Daily.  Dispense: 90 tablet; Refill: 1  -     metoprolol succinate XL (Toprol XL) 25 MG 24 hr tablet; Take 1 tablet by mouth Daily.  Dispense: 90 tablet; Refill: 0  -     verapamil ER (VERELAN) 240 MG 24 hr capsule; Take 1 capsule by mouth Every Night.  Dispense: 90 capsule; Refill: 1    3. Hypogonadism, male  Comments:  check T today, cont q2 wk injections.   Orders:  -     Testosterone    4. Acute left-sided low back pain with left-sided sciatica  Comments:  rec proper body mechanics, start riding bike, rec weight loss. use ibuprofen prn, heat/ice.     5. Anxiety    6. Other hyperlipidemia  Comments:  lipids today. work on HHD and weight loss    7. Chronic pain of left knee  Comments:  rec exercise, ice if needed, ibuprof prn.     Other orders  -     omeprazole (priLOSEC) 40 MG capsule; Take 1 capsule by mouth Every Night. For GERD  Dispense: 90 capsule; Refill: 1  -     hydrOXYzine (ATARAX) 10 MG tablet; Take 1-2 tablets by mouth At Night As Needed for Anxiety (insomnia).  Dispense: 40 tablet; Refill: 0  -     ibuprofen (ADVIL,MOTRIN) 800 MG tablet; Take 1 tablet by mouth Every 8 (Eight) Hours As Needed for Moderate Pain .  Dispense: 90 tablet; Refill: 1        I spent 30 minutes caring for Jaron Espinoza on this date of service. This time includes time spent by me in the following activities: preparing for the visit, reviewing tests, performing a medically appropriate examination and/or evaluation , counseling and educating the  patient/family/caregiver, ordering medications, tests, or procedures and documenting information in the medical record        Follow Up     Return in about 6 months (around 11/10/2022) for Recheck, HTN, GERD, low T. HTN panel and testosterone prior to appt.  Patient was given instructions and counseling regarding his condition or for health maintenance advice. Please see specific information pulled into the AVS if appropriate.        Part of this note may be an electronic transcription/translation of spoken language to printed text using the Dragon Dictation System

## 2022-05-11 LAB
ALBUMIN SERPL-MCNC: 4.2 G/DL (ref 3.5–5.2)
ALBUMIN/GLOB SERPL: 1.6 G/DL
ALP SERPL-CCNC: 76 U/L (ref 39–117)
ALT SERPL W P-5'-P-CCNC: 19 U/L (ref 1–41)
ANION GAP SERPL CALCULATED.3IONS-SCNC: 12 MMOL/L (ref 5–15)
AST SERPL-CCNC: 22 U/L (ref 1–40)
BILIRUB SERPL-MCNC: 0.3 MG/DL (ref 0–1.2)
BUN SERPL-MCNC: 14 MG/DL (ref 8–23)
BUN/CREAT SERPL: 12.1 (ref 7–25)
CALCIUM SPEC-SCNC: 9.6 MG/DL (ref 8.6–10.5)
CHLORIDE SERPL-SCNC: 103 MMOL/L (ref 98–107)
CHOLEST SERPL-MCNC: 215 MG/DL (ref 0–200)
CO2 SERPL-SCNC: 27 MMOL/L (ref 22–29)
CREAT SERPL-MCNC: 1.16 MG/DL (ref 0.76–1.27)
EGFRCR SERPLBLD CKD-EPI 2021: 70.8 ML/MIN/1.73
GLOBULIN UR ELPH-MCNC: 2.7 GM/DL
GLUCOSE SERPL-MCNC: 83 MG/DL (ref 65–99)
HDLC SERPL-MCNC: 30 MG/DL (ref 40–60)
LDLC SERPL CALC-MCNC: 119 MG/DL (ref 0–100)
LDLC/HDLC SERPL: 3.67 {RATIO}
POTASSIUM SERPL-SCNC: 4.4 MMOL/L (ref 3.5–5.2)
PROT SERPL-MCNC: 6.9 G/DL (ref 6–8.5)
SODIUM SERPL-SCNC: 142 MMOL/L (ref 136–145)
TESTOST SERPL-MCNC: 233 NG/DL (ref 193–740)
TRIGL SERPL-MCNC: 374 MG/DL (ref 0–150)
TSH SERPL DL<=0.05 MIU/L-ACNC: 1.71 UIU/ML (ref 0.27–4.2)
VLDLC SERPL-MCNC: 66 MG/DL (ref 5–40)

## 2022-05-13 RX ORDER — ATORVASTATIN CALCIUM 10 MG/1
10 TABLET, FILM COATED ORAL NIGHTLY
Qty: 90 TABLET | Refills: 1 | Status: SHIPPED | OUTPATIENT
Start: 2022-05-13 | End: 2022-11-10 | Stop reason: SDUPTHER

## 2022-05-27 DIAGNOSIS — E29.1 HYPOGONADISM, MALE: ICD-10-CM

## 2022-05-27 RX ORDER — NEEDLES, FILTER 19GX1 1/2"
NEEDLE, DISPOSABLE MISCELLANEOUS
Qty: 12 EACH | Refills: 0 | Status: SHIPPED | OUTPATIENT
Start: 2022-05-27 | End: 2023-02-20 | Stop reason: SDUPTHER

## 2022-05-27 RX ORDER — TESTOSTERONE CYPIONATE 200 MG/ML
200 INJECTION, SOLUTION INTRAMUSCULAR
Qty: 10 ML | Refills: 0 | Status: SHIPPED | OUTPATIENT
Start: 2022-05-27 | End: 2022-11-10 | Stop reason: SDUPTHER

## 2022-05-27 NOTE — TELEPHONE ENCOUNTER
"Caller: Jaron Espinoza    Relationship: Self    Best call back number:     Requested Prescriptions:   Requested Prescriptions     Pending Prescriptions Disp Refills   • Testosterone Cypionate (DEPOTESTOTERONE CYPIONATE) 200 MG/ML injection 10 mL 0     Sig: Inject 1 mL into the appropriate muscle as directed by prescriber Every 14 (Fourteen) Days.   • Syringe/Needle, Disp, (B-D INTEGRA SYRINGE) 23G X 1\" 3 ML misc 12 each 0     Sig: USE TO INJECT TESTOSTERONE ONCE EVERY 14 DAYS        Pharmacy where request should be sent: [facility] [place] - [address] [place] [address] - [phone] [facility] [phone] FX     Additional details provided by patient: PATIENT IS COMPLETELY OUT AND THE PHARMACY HAS REQUESTED THESE PRESCRIPTIONS SEVERAL TIMES PER PATIENT.  Does the patient have less than a 3 day supply:  [x] Yes  [] No    Tessy Pope Rep   05/27/22 11:05 EDT             "

## 2022-07-01 ENCOUNTER — TELEPHONE (OUTPATIENT)
Dept: FAMILY MEDICINE CLINIC | Facility: CLINIC | Age: 62
End: 2022-07-01

## 2022-07-01 NOTE — TELEPHONE ENCOUNTER
Patient notified and indicated understanding. He added that his cough is starting to be a bit productive. He said he would try this and monitor symptoms over the weekend and let us know of any changes. I advised UC for worsening symptoms.

## 2022-07-01 NOTE — TELEPHONE ENCOUNTER
It is likely allergy symptoms and post nasal drip. Recommend zyrtec/claritin and flonase otc, treat the symptoms with Mucinex DM or DayQuil/NyQuil, Tylenol/ibuprofen as needed for body aches or fever and push fluids

## 2022-07-01 NOTE — TELEPHONE ENCOUNTER
Patient came by the office this morning with complaints of a sore throat that he has had since about Tuesday, 6/28. He says it hurts to swallow and is difficult to eat. There is a little bit of a tickle and a cough. Denied any fever, chills, SOA, body aches other than the sore throat. The soonest we could schedule him is 7/12/22 and he is on a wait list. I advised urgent/ immediate care in the meantime. He did ask if there was anything provider could suggest or call in to [facility] in [place]. Please advise.

## 2022-07-12 ENCOUNTER — OFFICE VISIT (OUTPATIENT)
Dept: FAMILY MEDICINE CLINIC | Facility: CLINIC | Age: 62
End: 2022-07-12

## 2022-07-12 VITALS
HEIGHT: 68 IN | BODY MASS INDEX: 26.95 KG/M2 | HEART RATE: 74 BPM | SYSTOLIC BLOOD PRESSURE: 130 MMHG | WEIGHT: 177.8 LBS | DIASTOLIC BLOOD PRESSURE: 76 MMHG | OXYGEN SATURATION: 97 %

## 2022-07-12 DIAGNOSIS — F43.0 STRESS REACTION: ICD-10-CM

## 2022-07-12 DIAGNOSIS — G44.209 TENSION HEADACHE: ICD-10-CM

## 2022-07-12 DIAGNOSIS — M79.671 FOOT PAIN, BILATERAL: Primary | ICD-10-CM

## 2022-07-12 DIAGNOSIS — M79.672 FOOT PAIN, BILATERAL: Primary | ICD-10-CM

## 2022-07-12 DIAGNOSIS — B35.1 ONYCHOMYCOSIS OF TOENAIL: ICD-10-CM

## 2022-07-12 DIAGNOSIS — I10 PRIMARY HYPERTENSION: ICD-10-CM

## 2022-07-12 DIAGNOSIS — J30.1 SEASONAL ALLERGIC RHINITIS DUE TO POLLEN: ICD-10-CM

## 2022-07-12 DIAGNOSIS — J01.10 ACUTE NON-RECURRENT FRONTAL SINUSITIS: ICD-10-CM

## 2022-07-12 PROCEDURE — 99214 OFFICE O/P EST MOD 30 MIN: CPT | Performed by: NURSE PRACTITIONER

## 2022-07-12 PROCEDURE — 96372 THER/PROPH/DIAG INJ SC/IM: CPT | Performed by: NURSE PRACTITIONER

## 2022-07-12 RX ORDER — AMOXICILLIN 875 MG/1
875 TABLET, COATED ORAL 2 TIMES DAILY
Qty: 20 TABLET | Refills: 0 | Status: SHIPPED | OUTPATIENT
Start: 2022-07-12 | End: 2022-07-22

## 2022-07-12 RX ADMIN — TESTOSTERONE CYPIONATE 200 MG: 200 INJECTION, SOLUTION INTRAMUSCULAR at 10:59

## 2022-07-12 NOTE — PROGRESS NOTES
Chief Complaint  Chief Complaint   Patient presents with   • URI   • Sore Throat     Pt has been using mucinex dm, didn't help.  Pt reports he took a zpack that a friend of his had that they didn't finish (only took approx 5 doses), but reports it kind of helped sore throat but still expc congestion.   • Cyst     On back of neck, would like you to evaluate.    • Foot Problem     When he sleeps, he has shooting pain in his toes, reports it's not all the time.           Subjective          Jaron Espinoza presents to [facility] for   URI   This is a new problem. The current episode started in the past 7 days. The problem has been unchanged. There has been no fever. Associated symptoms include congestion, coughing, headaches, sneezing, a sore throat and swollen glands. Pertinent negatives include no ear pain. He has tried decongestant (Few days of a Z-Tae) for the symptoms.   Sore Throat   This is a new problem. The current episode started in the past 7 days. The problem has been gradually improving. There has been no fever. Associated symptoms include congestion, coughing, headaches, swollen glands and trouble swallowing. Pertinent negatives include no ear pain or shortness of breath. He has had no exposure to strep or mono. He has tried acetaminophen for the symptoms. The treatment provided no relief.     Cyst  Has small bump on back of neck at the hairline, present for about 1 week, was tender but now improving    Foot problem  Patient reports a sharp intermittent pain, feels like a needle sticking through his fore foot/toes, occurs only at night, happens to both feet, only the first 3 toes, denies numbness/tingling.  He does report feet get cold often but warm up quickly, he denies any discoloration but does have a fungal toenail on the left foot he has been dealing with for years    Memory problem  Concerned he is forgetting things lately, his father has alzheimers.  He  reports extreme stress has multiple tasks on a daily basis to complete for himself, his father and helping others.  Has a garden, laundry, pool, yard, fixed a water line for someone, feels overwhelmed at times and he does report frequent headaches that start in the back of the head and radiate to the top. He left water running in sink when went to do another task and this is unusual for him.       The following portions of the patient's history were reviewed and updated as appropriate: allergies, current medications, past family history, past medical history, past social history, past surgical history and problem list.    Past Medical History:   Diagnosis Date   • Headache    • Hx of hypogonadism    • Hyperlipidemia    • Hypertension      Past Surgical History:   Procedure Laterality Date   • OTHER SURGICAL HISTORY      TIA- [facility]     Family History   Problem Relation Age of Onset   • Cancer Mother      Social History     Tobacco Use   • Smoking status: Former Smoker     Packs/day: 0.50     Years: 15.00     Pack years: 7.50     Types: Cigarettes     Quit date:      Years since quittin.5   • Smokeless tobacco: Never Used   • Tobacco comment: quit x30 years   Substance Use Topics   • Alcohol use: No       Current Outpatient Medications:   •  atorvastatin (LIPITOR) 10 MG tablet, Take 1 tablet by mouth Every Night., Disp: 90 tablet, Rfl: 1  •  diclofenac (VOLTAREN) 1 % gel gel, Apply 4 g topically to the appropriate area as directed 4 (Four) Times a Day As Needed (for left arm tendinitis pain)., Disp: 150 g, Rfl: 0  •  fluticasone (Flonase) 50 MCG/ACT nasal spray, 2 sprays into the nostril(s) as directed by provider Daily., Disp: 11.1 mL, Rfl: 3  •  hydrOXYzine (ATARAX) 10 MG tablet, Take 1-2 tablets by mouth At Night As Needed for Anxiety (insomnia)., Disp: 40 tablet, Rfl: 0  •  ibuprofen (ADVIL,MOTRIN) 800 MG tablet, Take 1 tablet by mouth Every 8 (Eight) Hours As Needed for Moderate Pain ., Disp: 90  "tablet, Rfl: 1  •  lisinopril (PRINIVIL,ZESTRIL) 40 MG tablet, Take 1 tablet by mouth Daily., Disp: 90 tablet, Rfl: 1  •  metoprolol succinate XL (Toprol XL) 25 MG 24 hr tablet, Take 1 tablet by mouth Daily., Disp: 90 tablet, Rfl: 0  •  omeprazole (priLOSEC) 40 MG capsule, Take 1 capsule by mouth Every Night. For GERD, Disp: 90 capsule, Rfl: 1  •  Syringe, Disposable, 3 ML misc, 1 mL Every 14 (Fourteen) Days., Disp: 2 each, Rfl: 4  •  Syringe/Needle, Disp, (B-D INTEGRA SYRINGE) 23G X 1\" 3 ML misc, USE TO INJECT TESTOSTERONE ONCE EVERY 14 DAYS, Disp: 12 each, Rfl: 0  •  Testosterone Cypionate (DEPOTESTOTERONE CYPIONATE) 200 MG/ML injection, Inject 1 mL into the appropriate muscle as directed by prescriber Every 14 (Fourteen) Days., Disp: 10 mL, Rfl: 0  •  verapamil ER (VERELAN) 240 MG 24 hr capsule, Take 1 capsule by mouth Every Night., Disp: 90 capsule, Rfl: 1  •  amoxicillin (AMOXIL) 875 MG tablet, Take 1 tablet by mouth 2 (Two) Times a Day for 10 days., Disp: 20 tablet, Rfl: 0    Current Facility-Administered Medications:   •  Testosterone Cypionate (DEPOTESTOTERONE CYPIONATE) injection 200 mg, 200 mg, Intramuscular, Q14 Days, Rina Colindres APRN, 200 mg at 11/30/21 1201    Objective   Vital Signs:   /76 (BP Location: Left arm, Patient Position: Sitting, Cuff Size: Adult)   Pulse 74   Ht 172.7 cm (67.99\")   Wt 80.6 kg (177 lb 12.8 oz)   SpO2 97%   BMI 27.04 kg/m²           Physical Exam  Constitutional:       General: He is not in acute distress.     Appearance: He is normal weight. He is ill-appearing.   HENT:      Head: Normocephalic.      Right Ear: Tympanic membrane and ear canal normal.      Left Ear: Tympanic membrane and ear canal normal.      Nose: Congestion present.      Comments: Thick postnasal gtt, frontal sinus ttp     Mouth/Throat:      Pharynx: Posterior oropharyngeal erythema present.   Eyes:      Pupils: Pupils are equal, round, and reactive to light.   Cardiovascular:      Rate and " Rhythm: Normal rate.      Heart sounds: Normal heart sounds. No murmur heard.  Pulmonary:      Effort: Pulmonary effort is normal. No respiratory distress.      Breath sounds: No wheezing, rhonchi or rales.   Musculoskeletal:         General: No swelling, tenderness, deformity or signs of injury. Normal range of motion.      Cervical back: Normal range of motion.   Skin:     General: Skin is warm.      Findings: Lesion (small acne pustule rihgt posterior neck) present.      Comments: Left second toenail/yellow, no tenderness either foot/toes on exam, good range of motion, both feet are warm/pink with palpable pedal pulses +3 bilaterally   Neurological:      General: No focal deficit present.      Mental Status: He is alert and oriented to person, place, and time. Mental status is at baseline.   Psychiatric:         Mood and Affect: Mood normal.         Behavior: Behavior normal.         Thought Content: Thought content normal.         Judgment: Judgment normal.          Result Review :     No visits with results within 7 Day(s) from this visit.   Latest known visit with results is:   Office Visit on 05/10/2022   Component Date Value Ref Range Status   • Total Cholesterol 05/10/2022 215 (A) 0 - 200 mg/dL Final   • Triglycerides 05/10/2022 374 (A) 0 - 150 mg/dL Final   • HDL Cholesterol 05/10/2022 30 (A) 40 - 60 mg/dL Final   • LDL Cholesterol  05/10/2022 119 (A) 0 - 100 mg/dL Final   • VLDL Cholesterol 05/10/2022 66 (A) 5 - 40 mg/dL Final   • LDL/HDL Ratio 05/10/2022 3.67   Final   • Glucose 05/10/2022 83  65 - 99 mg/dL Final   • BUN 05/10/2022 14  8 - 23 mg/dL Final   • Creatinine 05/10/2022 1.16  0.76 - 1.27 mg/dL Final   • Sodium 05/10/2022 142  136 - 145 mmol/L Final   • Potassium 05/10/2022 4.4  3.5 - 5.2 mmol/L Final   • Chloride 05/10/2022 103  98 - 107 mmol/L Final   • CO2 05/10/2022 27.0  22.0 - 29.0 mmol/L Final   • Calcium 05/10/2022 9.6  8.6 - 10.5 mg/dL Final   • Total Protein 05/10/2022 6.9  6.0 - 8.5  g/dL Final   • Albumin 05/10/2022 4.20  3.50 - 5.20 g/dL Final   • ALT (SGPT) 05/10/2022 19  1 - 41 U/L Final   • AST (SGOT) 05/10/2022 22  1 - 40 U/L Final   • Alkaline Phosphatase 05/10/2022 76  39 - 117 U/L Final   • Total Bilirubin 05/10/2022 0.3  0.0 - 1.2 mg/dL Final   • Globulin 05/10/2022 2.7  gm/dL Final   • A/G Ratio 05/10/2022 1.6  g/dL Final   • BUN/Creatinine Ratio 05/10/2022 12.1  7.0 - 25.0 Final   • Anion Gap 05/10/2022 12.0  5.0 - 15.0 mmol/L Final   • eGFR 05/10/2022 70.8  >60.0 mL/min/1.73 Final    National Kidney Foundation and American Society of Nephrology (ASN) Task Force recommended calculation based on the Chronic Kidney Disease Epidemiology Collaboration (CKD-EPI) equation refit without adjustment for race.   • WBC 05/10/2022 8.82  3.40 - 10.80 10*3/mm3 Final   • RBC 05/10/2022 5.64  4.14 - 5.80 10*6/mm3 Final   • Hemoglobin 05/10/2022 16.4  13.0 - 17.7 g/dL Final   • Hematocrit 05/10/2022 50.0  37.5 - 51.0 % Final   • MCV 05/10/2022 88.7  79.0 - 97.0 fL Final   • MCH 05/10/2022 29.1  26.6 - 33.0 pg Final   • MCHC 05/10/2022 32.8  31.5 - 35.7 g/dL Final   • RDW 05/10/2022 13.1  12.3 - 15.4 % Final   • RDW-SD 05/10/2022 42.4  37.0 - 54.0 fl Final   • MPV 05/10/2022 10.0  6.0 - 12.0 fL Final   • Platelets 05/10/2022 407  140 - 450 10*3/mm3 Final   • TSH 05/10/2022 1.710  0.270 - 4.200 uIU/mL Final   • Testosterone, Total 05/10/2022 233.00  193.00 - 740.00 ng/dL Final                             Assessment and Plan    Diagnoses and all orders for this visit:    1. Foot pain, bilateral (Primary)  Comments:  referral to Dr. Vázquez for eval  Orders:  -     Ambulatory Referral to Podiatry    2. Acute non-recurrent frontal sinusitis  Comments:  start amox 10 days, mucinex DM otc prn    3. Tension headache  Comments:  situational r/t stress, rec otc ibuprofen prn    4. Seasonal allergic rhinitis due to pollen  Comments:  rec zyrtec daily otc    5. Primary hypertension  Comments:  check meds, pt  should be on metoprolol, verapamil, and lisinopril.     6. Onychomycosis of toenail  Comments:  try vicks vapor rub to thickened/fungal nails, see pod.   Orders:  -     Ambulatory Referral to Podiatry    7. Stress reaction  Comments:  Memory likely due to stress. rec make a list and check things off, complete 1 task at a time, take time for self, mediatate, deep breath etc.     Other orders  -     amoxicillin (AMOXIL) 875 MG tablet; Take 1 tablet by mouth 2 (Two) Times a Day for 10 days.  Dispense: 20 tablet; Refill: 0      Pustule back of neck self-limiting    I spent 30 minutes caring for Jaron Espinoza on this date of service. This time includes time spent by me in the following activities: preparing for the visit, reviewing tests, performing a medically appropriate examination and/or evaluation , counseling and educating the patient/family/caregiver, ordering medications, tests, or procedures and documenting information in the medical record        Follow Up     Return if symptoms worsen or fail to improve, for Next scheduled follow up, HTN, GERD, low T. HTN panel, total T prior to appt. ACE mini at appt.  Patient was given instructions and counseling regarding his condition or for health maintenance advice. Please see specific information pulled into the AVS if appropriate.        Part of this note may be an electronic transcription/translation of spoken language to printed text using the Dragon Dictation System

## 2022-08-11 ENCOUNTER — OFFICE VISIT (OUTPATIENT)
Dept: PODIATRY | Facility: CLINIC | Age: 62
End: 2022-08-11

## 2022-08-11 VITALS — OXYGEN SATURATION: 97 % | WEIGHT: 177 LBS | HEIGHT: 68 IN | HEART RATE: 91 BPM | BODY MASS INDEX: 26.83 KG/M2

## 2022-08-11 DIAGNOSIS — M77.41 METATARSALGIA OF BOTH FEET: ICD-10-CM

## 2022-08-11 DIAGNOSIS — G89.29 CHRONIC PAIN OF LEFT KNEE: ICD-10-CM

## 2022-08-11 DIAGNOSIS — M79.671 BILATERAL FOOT PAIN: Primary | ICD-10-CM

## 2022-08-11 DIAGNOSIS — M79.672 BILATERAL FOOT PAIN: Primary | ICD-10-CM

## 2022-08-11 DIAGNOSIS — M21.6X1 EQUINUS DEFORMITY OF BOTH FEET: ICD-10-CM

## 2022-08-11 DIAGNOSIS — M21.6X2 EQUINUS DEFORMITY OF BOTH FEET: ICD-10-CM

## 2022-08-11 DIAGNOSIS — M25.562 CHRONIC PAIN OF LEFT KNEE: ICD-10-CM

## 2022-08-11 DIAGNOSIS — M77.42 METATARSALGIA OF BOTH FEET: ICD-10-CM

## 2022-08-11 PROCEDURE — 99203 OFFICE O/P NEW LOW 30 MIN: CPT | Performed by: PODIATRIST

## 2022-08-11 RX ORDER — METHYLPREDNISOLONE 4 MG/1
TABLET ORAL
Qty: 21 TABLET | Refills: 0 | Status: SHIPPED | OUTPATIENT
Start: 2022-08-11 | End: 2022-10-31

## 2022-08-11 NOTE — PROGRESS NOTES
"08/11/2022  Foot and Ankle Surgery - New Patient   Provider: Dr. aMrty Vázquez DPM  Location: [facility]    Subjective:  Jaron Espinoza is a 61 y.o. male.     Chief Complaint   Patient presents with   • Left Foot - Pain   • Right Foot - Pain       HPI:     The patient is a 62-year-old male, YOB: 1960. He presents today with complaints of bilateral foot pain.     He states he is doing well today. He reports when he is laying down in bed at night, he will wake up around 3 or 4 o'clock in the morning and describes the pain as if experiencing a \"toothache\" right in this (unspecified) area and it radiates to his toes. He add the pain is really intense. He thinks that he was maybe hanging a toenail on a quilt and probably sprained it; however, he come to realize that was not the situation. He notes when he is taking his boots off it feels as if something is moving inside. He denies experiencing numbness. He adds that his feet do not bother him a lot during the day. 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 He states that he does not experience pain all how; however, if he is working it might hurt all day, but just in the mornings or when he goes to bed he can feel like if \"it's something shifted in there or something on that foot.\" He notes that he had plantar fasciitis in the past. The patient askes if he can be prescribed medication to help relieve his pain.     The patient reports that his dog likes to get his boots and play with them, he adds that his dog caught his tooth inside the boot and ripped the fabric. He notes to having inserts in his boots.    He states that he noticed that he is having slight back discomfort. He adds that he is also having knee issues that he never had before. He reports when he sits down it takes the pressure off.    The patient questions about acupuncture. He states that he scheduled an appointment; however, 4 to 5 day before " his scheduled appointment he canceled due to improvement with his pain, then a day after all the pain returns.     No Known Allergies    Past Medical History:   Diagnosis Date   • Headache    • Hx of hypogonadism    • Hyperlipidemia    • Hypertension        Past Surgical History:   Procedure Laterality Date   • OTHER SURGICAL HISTORY      TIA- [facility]       Family History   Problem Relation Age of Onset   • Cancer Mother        Social History     Socioeconomic History   • Marital status: Single   Tobacco Use   • Smoking status: Former Smoker     Packs/day: 0.50     Years: 15.00     Pack years: 7.50     Types: Cigarettes     Quit date:      Years since quittin.6   • Smokeless tobacco: Never Used   • Tobacco comment: quit x30 years   Vaping Use   • Vaping Use: Never used   Substance and Sexual Activity   • Alcohol use: No   • Drug use: No   • Sexual activity: Defer        Current Outpatient Medications on File Prior to Visit   Medication Sig Dispense Refill   • atorvastatin (LIPITOR) 10 MG tablet Take 1 tablet by mouth Every Night. 90 tablet 1   • diclofenac (VOLTAREN) 1 % gel gel Apply 4 g topically to the appropriate area as directed 4 (Four) Times a Day As Needed (for left arm tendinitis pain). 150 g 0   • fluticasone (Flonase) 50 MCG/ACT nasal spray 2 sprays into the nostril(s) as directed by provider Daily. 11.1 mL 3   • hydrOXYzine (ATARAX) 10 MG tablet Take 1-2 tablets by mouth At Night As Needed for Anxiety (insomnia). 40 tablet 0   • ibuprofen (ADVIL,MOTRIN) 800 MG tablet Take 1 tablet by mouth Every 8 (Eight) Hours As Needed for Moderate Pain . 90 tablet 1   • lisinopril (PRINIVIL,ZESTRIL) 40 MG tablet Take 1 tablet by mouth Daily. 90 tablet 1   • metoprolol succinate XL (Toprol XL) 25 MG 24 hr tablet Take 1 tablet by mouth Daily. 90 tablet 0   • omeprazole (priLOSEC) 40 MG capsule Take 1 capsule by mouth Every Night. For GERD 90 capsule 1   • Syringe, Disposable, 3 ML misc 1 mL Every 14  "(Fourteen) Days. 2 each 4   • Syringe/Needle, Disp, (B-D INTEGRA SYRINGE) 23G X 1\" 3 ML misc USE TO INJECT TESTOSTERONE ONCE EVERY 14 DAYS 12 each 0   • Testosterone Cypionate (DEPOTESTOTERONE CYPIONATE) 200 MG/ML injection Inject 1 mL into the appropriate muscle as directed by prescriber Every 14 (Fourteen) Days. 10 mL 0   • verapamil ER (VERELAN) 240 MG 24 hr capsule Take 1 capsule by mouth Every Night. 90 capsule 1     Current Facility-Administered Medications on File Prior to Visit   Medication Dose Route Frequency Provider Last Rate Last Admin   • Testosterone Cypionate (DEPOTESTOTERONE CYPIONATE) injection 200 mg  200 mg Intramuscular Q14 Days Marisol Colindreskera TITUS, APRN   200 mg at 07/12/22 1059       Review of Systems:  General: Denies fever, chills, fatigue, and weakness.  Eyes: Denies vision loss, blurry vision, and excessive redness.  ENT: Denies hearing issues and difficulty swallowing.  Cardiovascular: Denies palpitations, chest pain, or syncopal episodes.  Respiratory: Denies shortness of breath, wheezing, and coughing.  GI: Denies abdominal pain, nausea, and vomiting.   : Denies frequency, hematuria, and urgency.  Musculoskeletal: Denies muscle cramps, joint pains, and stiffness.  Derm: Denies rash, open wounds, or suspicious lesions.  Neuro: Denies headaches, numbness, loss of coordination, and tremors.  Psych: Denies anxiety and depression.  Endocrine: Denies temperature intolerance and changes in appetite.  Heme: Denies bleeding disorders or abnormal bruising.     Objective   Pulse 91   Ht 172.7 cm (67.99\")   Wt 80.3 kg (177 lb)   SpO2 97%   BMI 26.92 kg/m²     Foot/Ankle Exam:       General:   Appearance: appears stated age and healthy    Orientation: AAOx3    Affect: appropriate      VASCULAR      Right Foot Vascularity   Normal vascular exam    Dorsalis pedis:  2+  Posterior tibial:  2+  Skin Temperature: warm    Edema Grading:  None  CFT:  < 3 seconds  Pedal Hair Growth:  Present  Varicosities: " none       Left Foot Vascularity   Normal vascular exam    Dorsalis pedis:  2+  Posterior tibial:  2+  Skin Temperature: warm    Edema Grading:  None  CFT:  < 3 seconds  Pedal Hair Growth:  Present  Varicosities: none        NEUROLOGIC     Right Foot Neurologic   Light touch sensation:  Normal  Hot/Cold sensation: normal    Achilles reflex:  2+     Left Foot Neurologic   Light touch sensation:  Normal  Hot/cold sensation: normal    Achilles reflex:  2+     MUSCULOSKELETAL      Right Foot Musculoskeletal   Ecchymosis:  None  Arch:  Normal     Left Foot Musculoskeletal   Ecchymosis:  None  Arch:  Normal     MUSCLE STRENGTH     Right Foot Muscle Strength   Normal strength    Foot dorsiflexion:  5  Foot plantar flexion:  5  Foot inversion:  5  Foot eversion:  5     Left Foot Muscle Strength   Normal strength    Foot dorsiflexion:  5  Foot plantar flexion:  5  Foot inversion:  5  Foot eversion:  5     DERMATOLOGIC     Right Foot Dermatologic   Skin: skin intact    Nails comment:  Nails 1-5     Left Foot Dermatologic   Skin: skin intact    Nails comment:  Nails 1-5     TESTS     Right Foot Tests   Anterior drawer: negative    Varus tilt: negative       Left Foot Tests   Anterior drawer: negative    Varus tilt: negative        Right Foot Additional Comments Moderate soft tissue rigidity involving the foot, bilateral. Moderate equinus contracture with knee extended and flexed, bilateral. No gross deformity.       Left Foot Additional Comments: Moderate soft tissue rigidity involving the foot, bilateral. Moderate equinus contracture with knee extended and flexed, bilateral. No gross deformity.       Assessment & Plan   Diagnoses and all orders for this visit:    1. Bilateral foot pain (Primary)  -     XR Foot 3+ View Bilateral    2. Metatarsalgia of both feet    3. Equinus deformity of both feet    4. Chronic pain of left knee    Other orders  -     methylPREDNISolone (MEDROL) 4 MG dose pack; Take as directed on package  instructions.  Dispense: 21 tablet; Refill: 0        The patients present today for an initial evaluation on his bilateral foot pain. The patient and I discussed what he is experiencing is metatarsalgia which is pain to the forefoot due to gravity weight and activity throughout the day. X-ray results reviewed with the patient. The patient is advised to wear power step inserts and wear them day in and day out. The patient is informed to not be barefoot, avoid flipflops, and no sandals. The patient is recommended to do stretching exercises. Exercises discussed with the patient. If the patient has not noticed improvement, the next option is to consider physical therapy or alternative treatments.  The patient will be prescribed a Medrol dosepak. Set up an appointment with Dr. Pitt to evaluate the patients knee. The patient will follow-up in 4 weeks.     Greater than 45 minutes spent before coming, during coming after evaluation for patient care.    Orders Placed This Encounter   Procedures   • XR Foot 3+ View Bilateral     Order Specific Question:   Reason for Exam:     Answer:   bilteral foot pain, up into toes. room 13      Order Specific Question:   Does this patient have a diabetic monitoring/medication delivering device on?     Answer:   No     Order Specific Question:   Release to patient     Answer:   Routine Release        Note is dictated utilizing voice recognition software. Unfortunately this leads to occasional typographical errors. I apologize in advance if the situation occurs. If questions occur please do not hesitate to call our office.    Transcribed from ambient dictation for GIULIANO Vázquez DPM by Vikas Burris .  08/11/22   19:06 EDT    Patient verbalized consent to the visit recording.  I have personally performed the services described in this document as transcribed by the above individual, and it is both accurate and complete.  GIULIANO Vázquez DPM  8/12/2022  06:59 EDT

## 2022-10-31 ENCOUNTER — CLINICAL SUPPORT (OUTPATIENT)
Dept: FAMILY MEDICINE CLINIC | Facility: CLINIC | Age: 62
End: 2022-10-31

## 2022-10-31 ENCOUNTER — TELEPHONE (OUTPATIENT)
Dept: FAMILY MEDICINE CLINIC | Facility: CLINIC | Age: 62
End: 2022-10-31

## 2022-10-31 DIAGNOSIS — I10 ESSENTIAL HYPERTENSION: ICD-10-CM

## 2022-10-31 DIAGNOSIS — E78.49 OTHER HYPERLIPIDEMIA: ICD-10-CM

## 2022-10-31 DIAGNOSIS — I10 PRIMARY HYPERTENSION: Primary | ICD-10-CM

## 2022-10-31 LAB
DEPRECATED RDW RBC AUTO: 37.6 FL (ref 37–54)
ERYTHROCYTE [DISTWIDTH] IN BLOOD BY AUTOMATED COUNT: 12 % (ref 12.3–15.4)
HCT VFR BLD AUTO: 44.1 % (ref 37.5–51)
HGB BLD-MCNC: 15.2 G/DL (ref 13–17.7)
MCH RBC QN AUTO: 30.3 PG (ref 26.6–33)
MCHC RBC AUTO-ENTMCNC: 34.5 G/DL (ref 31.5–35.7)
MCV RBC AUTO: 88 FL (ref 79–97)
PLATELET # BLD AUTO: 274 10*3/MM3 (ref 140–450)
PMV BLD AUTO: 10.6 FL (ref 6–12)
RBC # BLD AUTO: 5.01 10*6/MM3 (ref 4.14–5.8)
WBC NRBC COR # BLD: 6.61 10*3/MM3 (ref 3.4–10.8)

## 2022-10-31 PROCEDURE — 96372 THER/PROPH/DIAG INJ SC/IM: CPT | Performed by: NURSE PRACTITIONER

## 2022-10-31 PROCEDURE — 80061 LIPID PANEL: CPT | Performed by: NURSE PRACTITIONER

## 2022-10-31 PROCEDURE — 80050 GENERAL HEALTH PANEL: CPT | Performed by: NURSE PRACTITIONER

## 2022-10-31 RX ORDER — METHYLPREDNISOLONE 4 MG/1
TABLET ORAL
Qty: 21 TABLET | Refills: 0 | Status: SHIPPED | OUTPATIENT
Start: 2022-10-31 | End: 2022-11-10

## 2022-10-31 RX ADMIN — TESTOSTERONE CYPIONATE 200 MG: 200 INJECTION, SOLUTION INTRAMUSCULAR at 13:50

## 2022-10-31 NOTE — PROGRESS NOTES
Injection  Injection performed in left upper outer quad by Janny Goodman MA. Patient tolerated the procedure well without complications.  10/31/22   Janny Goodman MA

## 2022-11-01 LAB
ALBUMIN SERPL-MCNC: 4.8 G/DL (ref 3.5–5.2)
ALBUMIN/GLOB SERPL: 2 G/DL
ALP SERPL-CCNC: 63 U/L (ref 39–117)
ALT SERPL W P-5'-P-CCNC: 24 U/L (ref 1–41)
ANION GAP SERPL CALCULATED.3IONS-SCNC: 11.4 MMOL/L (ref 5–15)
AST SERPL-CCNC: 23 U/L (ref 1–40)
BILIRUB SERPL-MCNC: 0.4 MG/DL (ref 0–1.2)
BUN SERPL-MCNC: 23 MG/DL (ref 8–23)
BUN/CREAT SERPL: 19.8 (ref 7–25)
CALCIUM SPEC-SCNC: 10 MG/DL (ref 8.6–10.5)
CHLORIDE SERPL-SCNC: 104 MMOL/L (ref 98–107)
CHOLEST SERPL-MCNC: 226 MG/DL (ref 0–200)
CO2 SERPL-SCNC: 25.6 MMOL/L (ref 22–29)
CREAT SERPL-MCNC: 1.16 MG/DL (ref 0.76–1.27)
EGFRCR SERPLBLD CKD-EPI 2021: 70.3 ML/MIN/1.73
GLOBULIN UR ELPH-MCNC: 2.4 GM/DL
GLUCOSE SERPL-MCNC: 90 MG/DL (ref 65–99)
HDLC SERPL-MCNC: 38 MG/DL (ref 40–60)
LDLC SERPL CALC-MCNC: 115 MG/DL (ref 0–100)
LDLC/HDLC SERPL: 2.73 {RATIO}
POTASSIUM SERPL-SCNC: 4.3 MMOL/L (ref 3.5–5.2)
PROT SERPL-MCNC: 7.2 G/DL (ref 6–8.5)
SODIUM SERPL-SCNC: 141 MMOL/L (ref 136–145)
TRIGL SERPL-MCNC: 421 MG/DL (ref 0–150)
TSH SERPL DL<=0.05 MIU/L-ACNC: 2.7 UIU/ML (ref 0.27–4.2)
VLDLC SERPL-MCNC: 73 MG/DL (ref 5–40)

## 2022-11-01 NOTE — TELEPHONE ENCOUNTER
Patient returned call. Informed of Rina's message. Indicated understanding. He is having trouble with his [employer] account so he requested exercises to be mailed. I provided him with [employer] support line number also.

## 2022-11-10 ENCOUNTER — OFFICE VISIT (OUTPATIENT)
Dept: FAMILY MEDICINE CLINIC | Facility: CLINIC | Age: 62
End: 2022-11-10

## 2022-11-10 VITALS
SYSTOLIC BLOOD PRESSURE: 152 MMHG | OXYGEN SATURATION: 98 % | DIASTOLIC BLOOD PRESSURE: 82 MMHG | HEIGHT: 68 IN | HEART RATE: 73 BPM | BODY MASS INDEX: 28.46 KG/M2 | TEMPERATURE: 99.1 F | WEIGHT: 187.8 LBS

## 2022-11-10 DIAGNOSIS — E78.2 MIXED HYPERLIPIDEMIA: ICD-10-CM

## 2022-11-10 DIAGNOSIS — M54.32 SCIATIC PAIN, LEFT: ICD-10-CM

## 2022-11-10 DIAGNOSIS — K21.9 GASTROESOPHAGEAL REFLUX DISEASE WITHOUT ESOPHAGITIS: ICD-10-CM

## 2022-11-10 DIAGNOSIS — E29.1 HYPOGONADISM, MALE: ICD-10-CM

## 2022-11-10 DIAGNOSIS — F43.0 STRESS REACTION: ICD-10-CM

## 2022-11-10 DIAGNOSIS — I10 ESSENTIAL HYPERTENSION: Primary | ICD-10-CM

## 2022-11-10 DIAGNOSIS — F41.9 ANXIETY: ICD-10-CM

## 2022-11-10 PROCEDURE — 99214 OFFICE O/P EST MOD 30 MIN: CPT | Performed by: NURSE PRACTITIONER

## 2022-11-10 RX ORDER — MULTIPLE VITAMINS W/ MINERALS TAB 9MG-400MCG
1 TAB ORAL DAILY
COMMUNITY

## 2022-11-10 RX ORDER — LISINOPRIL 40 MG/1
40 TABLET ORAL DAILY
Qty: 90 TABLET | Refills: 1 | Status: SHIPPED | OUTPATIENT
Start: 2022-11-10

## 2022-11-10 RX ORDER — OMEPRAZOLE 40 MG/1
40 CAPSULE, DELAYED RELEASE ORAL NIGHTLY
Qty: 90 CAPSULE | Refills: 1 | Status: SHIPPED | OUTPATIENT
Start: 2022-11-10 | End: 2023-04-05

## 2022-11-10 RX ORDER — TESTOSTERONE CYPIONATE 200 MG/ML
200 INJECTION, SOLUTION INTRAMUSCULAR
Qty: 10 ML | Refills: 0 | Status: SHIPPED | OUTPATIENT
Start: 2022-11-10 | End: 2023-03-19

## 2022-11-10 RX ORDER — METOPROLOL SUCCINATE 25 MG/1
25 TABLET, EXTENDED RELEASE ORAL DAILY
Qty: 90 TABLET | Refills: 1 | Status: SHIPPED | OUTPATIENT
Start: 2022-11-10

## 2022-11-10 RX ORDER — VERAPAMIL HYDROCHLORIDE 240 MG/1
240 CAPSULE, EXTENDED RELEASE ORAL NIGHTLY
Qty: 90 CAPSULE | Refills: 1 | Status: SHIPPED | OUTPATIENT
Start: 2022-11-10 | End: 2023-04-05

## 2022-11-10 RX ORDER — ATORVASTATIN CALCIUM 10 MG/1
10 TABLET, FILM COATED ORAL NIGHTLY
Qty: 90 TABLET | Refills: 1 | Status: SHIPPED | OUTPATIENT
Start: 2022-11-10

## 2022-11-10 RX ORDER — CLONIDINE HYDROCHLORIDE 0.1 MG/1
0.1 TABLET ORAL EVERY 8 HOURS PRN
Qty: 20 TABLET | Refills: 0 | Status: SHIPPED | OUTPATIENT
Start: 2022-11-10 | End: 2023-04-05

## 2022-11-10 NOTE — PROGRESS NOTES
Chief Complaint  Chief Complaint   Patient presents with   • Hypertension     Pt states he will randomly monitor his BP at home.  He felt dizzy on Saturday and took a reading and got  187/114 (Saturday). The 2nd reading was on Sunday 196/109.     • Heartburn           Subjective          Jaron Espinoza presents to [facility] for   History of Present Illness     HTN, has been elevated intermittently at home.patient was to be taking metoprolol along with lisinopril and verapamil, he still does not believe he is taking metoprolol.  He denies having any new or exacerbated anxiety/stress or pain at home this past weekend when bp was elevated as mentioned in CC. He is taking medications as directed, denies chest pain, headache, shortness of air, palpitations and swelling of extremities.     Hyperlipidemia, The patient denies muscle aches, constipation, diarrhea, GI upset, fatigue, chest pain/pressure, exercise intolerance, dyspnea, palpitations, syncope and pedal edema.      GERD, stable on medication, denies nausea, vomiting, constipation, abdominal pain and diarrhea.    Stress/anxiety, patient provided and uses hydroxyzine as needed    Hyperlipidemia, The patient denies muscle aches, constipation, diarrhea, GI upset, fatigue, chest pain/pressure, exercise intolerance, dyspnea, palpitations, syncope and pedal edema.      He came to the office for testosterone injection recently and reported having sciatica/low back pain radiating down his left leg into the knee.  He was provided Medrol Dosepak and recommended to start sciatic rehab exercises, he reports some improvement pain    Here to review labs      The following portions of the patient's history were reviewed and updated as appropriate: allergies, current medications, past family history, past medical history, past social history, past surgical history and problem list.    Past Medical History:   Diagnosis Date   • Headache    •  "Hx of hypogonadism    • Hyperlipidemia    • Hypertension      Past Surgical History:   Procedure Laterality Date   • OTHER SURGICAL HISTORY      TIA- [place] [facility]     Family History   Problem Relation Age of Onset   • Cancer Mother      Social History     Tobacco Use   • Smoking status: Former     Packs/day: 0.50     Years: 15.00     Pack years: 7.50     Types: Cigarettes     Quit date:      Years since quittin.8   • Smokeless tobacco: Never   • Tobacco comments:     quit x30 years   Substance Use Topics   • Alcohol use: No       Current Outpatient Medications:   •  atorvastatin (LIPITOR) 10 MG tablet, Take 1 tablet by mouth Every Night., Disp: 90 tablet, Rfl: 1  •  hydrOXYzine (ATARAX) 10 MG tablet, Take 1-2 tablets by mouth At Night As Needed for Anxiety (insomnia)., Disp: 40 tablet, Rfl: 0  •  ibuprofen (ADVIL,MOTRIN) 800 MG tablet, Take 1 tablet by mouth Every 8 (Eight) Hours As Needed for Moderate Pain ., Disp: 90 tablet, Rfl: 1  •  lisinopril (PRINIVIL,ZESTRIL) 40 MG tablet, Take 1 tablet by mouth Daily., Disp: 90 tablet, Rfl: 1  •  metoprolol succinate XL (Toprol XL) 25 MG 24 hr tablet, Take 1 tablet by mouth Daily., Disp: 90 tablet, Rfl: 1  •  multivitamin with minerals (MULTIVITAMIN ADULT PO), Take 1 tablet by mouth Daily., Disp: , Rfl:   •  omeprazole (priLOSEC) 40 MG capsule, Take 1 capsule by mouth Every Night. For GERD, Disp: 90 capsule, Rfl: 1  •  Syringe, Disposable, 3 ML misc, 1 mL Every 14 (Fourteen) Days., Disp: 2 each, Rfl: 4  •  Syringe/Needle, Disp, (B-D INTEGRA SYRINGE) 23G X 1\" 3 ML misc, USE TO INJECT TESTOSTERONE ONCE EVERY 14 DAYS, Disp: 12 each, Rfl: 0  •  Testosterone Cypionate (DEPOTESTOTERONE CYPIONATE) 200 MG/ML injection, Inject 1 mL into the appropriate muscle as directed by prescriber Every 14 (Fourteen) Days., Disp: 10 mL, Rfl: 0  •  verapamil ER (VERELAN) 240 MG 24 hr capsule, Take 1 capsule by mouth Every Night., Disp: 90 capsule, Rfl: 1  •  cloNIDine (Catapres) 0.1 MG " "tablet, Take 1 tablet by mouth Every 8 (Eight) Hours As Needed for High Blood Pressure (systolic over 160)., Disp: 20 tablet, Rfl: 0  •  diclofenac (VOLTAREN) 1 % gel gel, Apply 4 g topically to the appropriate area as directed 4 (Four) Times a Day As Needed (for left arm tendinitis pain)., Disp: 150 g, Rfl: 0  •  fluticasone (Flonase) 50 MCG/ACT nasal spray, 2 sprays into the nostril(s) as directed by provider Daily., Disp: 11.1 mL, Rfl: 3    Current Facility-Administered Medications:   •  Testosterone Cypionate (DEPOTESTOTERONE CYPIONATE) injection 200 mg, 200 mg, Intramuscular, Q14 Days, Rina Colindres, APRN, 200 mg at 10/31/22 1350    Objective   Vital Signs:   /82 (BP Location: Left arm, Patient Position: Sitting, Cuff Size: Adult)   Pulse 73   Temp 99.1 °F (37.3 °C) (Temporal)   Ht 172.7 cm (68\")   Wt 85.2 kg (187 lb 12.8 oz)   SpO2 98%   BMI 28.55 kg/m²           Physical Exam  Vitals and nursing note reviewed.   Constitutional:       General: He is not in acute distress.     Appearance: Normal appearance. He is well-developed. He is not diaphoretic.   HENT:      Head: Normocephalic and atraumatic.   Eyes:      Pupils: Pupils are equal, round, and reactive to light.   Neck:      Thyroid: No thyromegaly.   Cardiovascular:      Rate and Rhythm: Normal rate and regular rhythm.      Heart sounds: Normal heart sounds. No murmur heard.  Pulmonary:      Effort: Pulmonary effort is normal. No respiratory distress.      Breath sounds: Normal breath sounds. No stridor. No wheezing or rhonchi.   Abdominal:      General: Bowel sounds are normal. There is no distension.      Palpations: Abdomen is soft.      Tenderness: There is no abdominal tenderness.   Musculoskeletal:         General: Tenderness (L sciatic region mild ttp) present. No swelling. Normal range of motion.      Cervical back: Normal range of motion.   Skin:     General: Skin is warm and dry.      Findings: No erythema.   Neurological:      " General: No focal deficit present.      Mental Status: He is alert and oriented to person, place, and time. Mental status is at baseline.      Cranial Nerves: No cranial nerve deficit.   Psychiatric:         Behavior: Behavior normal.         Thought Content: Thought content normal.         Judgment: Judgment normal.          Result Review :     No visits with results within 7 Day(s) from this visit.   Latest known visit with results is:   Clinical Support on 10/31/2022   Component Date Value Ref Range Status   • Glucose 10/31/2022 90  65 - 99 mg/dL Final   • BUN 10/31/2022 23  8 - 23 mg/dL Final   • Creatinine 10/31/2022 1.16  0.76 - 1.27 mg/dL Final   • Sodium 10/31/2022 141  136 - 145 mmol/L Final   • Potassium 10/31/2022 4.3  3.5 - 5.2 mmol/L Final   • Chloride 10/31/2022 104  98 - 107 mmol/L Final   • CO2 10/31/2022 25.6  22.0 - 29.0 mmol/L Final   • Calcium 10/31/2022 10.0  8.6 - 10.5 mg/dL Final   • Total Protein 10/31/2022 7.2  6.0 - 8.5 g/dL Final   • Albumin 10/31/2022 4.80  3.50 - 5.20 g/dL Final   • ALT (SGPT) 10/31/2022 24  1 - 41 U/L Final   • AST (SGOT) 10/31/2022 23  1 - 40 U/L Final   • Alkaline Phosphatase 10/31/2022 63  39 - 117 U/L Final   • Total Bilirubin 10/31/2022 0.4  0.0 - 1.2 mg/dL Final   • Globulin 10/31/2022 2.4  gm/dL Final   • A/G Ratio 10/31/2022 2.0  g/dL Final   • BUN/Creatinine Ratio 10/31/2022 19.8  7.0 - 25.0 Final   • Anion Gap 10/31/2022 11.4  5.0 - 15.0 mmol/L Final   • eGFR 10/31/2022 70.3  >60.0 mL/min/1.73 Final    National Kidney Foundation and American Society of Nephrology (ASN) Task Force recommended calculation based on the Chronic Kidney Disease Epidemiology Collaboration (CKD-EPI) equation refit without adjustment for race.   • WBC 10/31/2022 6.61  3.40 - 10.80 10*3/mm3 Final   • RBC 10/31/2022 5.01  4.14 - 5.80 10*6/mm3 Final   • Hemoglobin 10/31/2022 15.2  13.0 - 17.7 g/dL Final   • Hematocrit 10/31/2022 44.1  37.5 - 51.0 % Final   • MCV 10/31/2022 88.0  79.0 -  97.0 fL Final   • MCH 10/31/2022 30.3  26.6 - 33.0 pg Final   • MCHC 10/31/2022 34.5  31.5 - 35.7 g/dL Final   • RDW 10/31/2022 12.0 (L)  12.3 - 15.4 % Final   • RDW-SD 10/31/2022 37.6  37.0 - 54.0 fl Final   • MPV 10/31/2022 10.6  6.0 - 12.0 fL Final   • Platelets 10/31/2022 274  140 - 450 10*3/mm3 Final   • Total Cholesterol 10/31/2022 226 (H)  0 - 200 mg/dL Final   • Triglycerides 10/31/2022 421 (H)  0 - 150 mg/dL Final   • HDL Cholesterol 10/31/2022 38 (L)  40 - 60 mg/dL Final   • LDL Cholesterol  10/31/2022 115 (H)  0 - 100 mg/dL Final   • VLDL Cholesterol 10/31/2022 73 (H)  5 - 40 mg/dL Final   • LDL/HDL Ratio 10/31/2022 2.73   Final   • TSH 10/31/2022 2.700  0.270 - 4.200 uIU/mL Final                  BMI is >= 25 and <30. (Overweight) The following options were offered after discussion;: exercise counseling/recommendations and nutrition counseling/recommendations           Assessment and Plan    Diagnoses and all orders for this visit:    1. Essential hypertension (Primary)  Comments:  elevated, still not taking metoprolol.   rec start metop, continue lisinopril and verapamil  clonidine prn for systolic >160.   call for sbp consistently >160.   Orders:  -     metoprolol succinate XL (Toprol XL) 25 MG 24 hr tablet; Take 1 tablet by mouth Daily.  Dispense: 90 tablet; Refill: 1  -     lisinopril (PRINIVIL,ZESTRIL) 40 MG tablet; Take 1 tablet by mouth Daily.  Dispense: 90 tablet; Refill: 1  -     verapamil ER (VERELAN) 240 MG 24 hr capsule; Take 1 capsule by mouth Every Night.  Dispense: 90 capsule; Refill: 1    2. Hypogonadism, male  Comments:  Continue testosterone injections  Orders:  -     Testosterone Cypionate (DEPOTESTOTERONE CYPIONATE) 200 MG/ML injection; Inject 1 mL into the appropriate muscle as directed by prescriber Every 14 (Fourteen) Days.  Dispense: 10 mL; Refill: 0    3. Stress reaction  Comments:  cont hydrox prn    4. Gastroesophageal reflux disease without esophagitis  Comments:  stable, cont  omep    5. Mixed hyperlipidemia  Comments:  Triglycerides very high, increased cardiac risk  Patient provided HHD education and information  rec inc exercise and work on weight loss  Cont statin    6. Anxiety    7. Sciatic pain, left  Comments:  Provided sciatica exercises for patient to work on at home  Notify if symptoms do not improve for further evaluation    Other orders  -     cloNIDine (Catapres) 0.1 MG tablet; Take 1 tablet by mouth Every 8 (Eight) Hours As Needed for High Blood Pressure (systolic over 160).  Dispense: 20 tablet; Refill: 0  -     omeprazole (priLOSEC) 40 MG capsule; Take 1 capsule by mouth Every Night. For GERD  Dispense: 90 capsule; Refill: 1  -     atorvastatin (LIPITOR) 10 MG tablet; Take 1 tablet by mouth Every Night.  Dispense: 90 tablet; Refill: 1        I spent 30 minutes caring for Jaron Espinoza on this date of service. This time includes time spent by me in the following activities: preparing for the visit, reviewing tests, performing a medically appropriate examination and/or evaluation , counseling and educating the patient/family/caregiver, ordering medications, tests, or procedures and documenting information in the medical record        Follow Up     Return in about 6 months (around 5/10/2023) for Recheck. HTN panel and testosterone prior to appt.  Patient was given instructions and counseling regarding his condition or for health maintenance advice. Please see specific information pulled into the AVS if appropriate.        Part of this note may be an electronic transcription/translation of spoken language to printed text using the Dragon Dictation System

## 2023-01-11 NOTE — PROGRESS NOTES
Venipuncture Blood Specimen Collection  Venipuncture performed by Janny Goodman MA with good hemostasis. Patient tolerated the procedure well without complications.   01/11/23   Janny Goodman MA

## 2023-01-16 ENCOUNTER — TELEPHONE (OUTPATIENT)
Dept: FAMILY MEDICINE CLINIC | Facility: CLINIC | Age: 63
End: 2023-01-16
Payer: MEDICAID

## 2023-01-16 RX ORDER — AZITHROMYCIN 250 MG/1
TABLET, FILM COATED ORAL
Qty: 6 TABLET | Refills: 0 | Status: SHIPPED | OUTPATIENT
Start: 2023-01-16 | End: 2023-01-30 | Stop reason: SDUPTHER

## 2023-01-16 NOTE — TELEPHONE ENCOUNTER
Pt called in this afternoon and was looking at getting an appointment or just getting a message back to Rina to see what she would recommend or could call in. Advised of provider booked schedule so he was okay with message. He said symptoms developed about 4-5 days ago. It started out as a white phlegm but is now green mucus that comes up when coughing. He gets nasal congestion at night which makes it hard to breathe as his nasal passages become stopped up. He says he has tried various OTC cough medicines and cough, in which he is getting no relief. He has also been experiencing sinus headaches. He mentioned a slight sore throat feeling he started to notice on one side but wants to kick this completely.   Please advise.

## 2023-01-30 ENCOUNTER — TELEPHONE (OUTPATIENT)
Dept: FAMILY MEDICINE CLINIC | Facility: CLINIC | Age: 63
End: 2023-01-30
Payer: MEDICAID

## 2023-01-30 RX ORDER — AZITHROMYCIN 250 MG/1
TABLET, FILM COATED ORAL
Qty: 6 TABLET | Refills: 0 | Status: SHIPPED | OUTPATIENT
Start: 2023-01-30 | End: 2023-04-05

## 2023-01-30 NOTE — TELEPHONE ENCOUNTER
Pt called in with concern of green drainage, chest congestion and blister/water bubble on roof of mouth. Pt states that medication prescribed helped with the drainage and congestion, has came back after completing meds.  Pt states the blister/water bubble on roof of mouth noticed it over weekend.  Pt was wanting same day, informed that schedule was full.  Please advise

## 2023-02-20 ENCOUNTER — TELEPHONE (OUTPATIENT)
Dept: FAMILY MEDICINE CLINIC | Facility: CLINIC | Age: 63
End: 2023-02-20
Payer: MEDICAID

## 2023-02-20 ENCOUNTER — TELEPHONE (OUTPATIENT)
Dept: FAMILY MEDICINE CLINIC | Facility: CLINIC | Age: 63
End: 2023-02-20

## 2023-02-20 DIAGNOSIS — E29.1 HYPOGONADISM IN MALE: Primary | ICD-10-CM

## 2023-02-20 RX ORDER — NEEDLES, FILTER 19GX1 1/2"
NEEDLE, DISPOSABLE MISCELLANEOUS
Qty: 12 EACH | Refills: 0 | Status: SHIPPED | OUTPATIENT
Start: 2023-02-20

## 2023-02-20 NOTE — TELEPHONE ENCOUNTER
Patient call back into office and spoke with Aissatou. Was informed of dermatology not accepting insurance. Wanted to know if there was any self pay options.

## 2023-02-20 NOTE — TELEPHONE ENCOUNTER
Caller: Jaron Espinoza    Relationship: Self    Best call back number: [phone]    What is the medical concern/diagnosis: BUMP ON HEAD IS GETTING BIGGER, BUMP ON SIDE     What specialty or service is being requested: DERMATOLOGY     What is the provider, practice or medical service name: UNKNOWN    What is the office location: UNKNOWN     What is the office phone number: UNKNOWN     Any additional details: PATIENT WAS SCHEDULED FOR FIRST AVAILABLE APPOINTMENT WITH PCP BUT IT IS NOT UNTIL 04.05.23. PATIENT DIDN'T KNOW IF IT WAS POSSIBLE TO GET A REFERRAL TO DERMATOLOGY.    PLEASE CALL PATIENT WITH UPDATE

## 2023-02-20 NOTE — TELEPHONE ENCOUNTER
"    Caller: Jaron Espinoza    Relationship: Self    Best call back number: [phone]    Requested Prescriptions:   Requested Prescriptions     Pending Prescriptions Disp Refills   • Syringe/Needle, Disp, (B-D INTEGRA SYRINGE) 23G X 1\" 3 ML misc 12 each 0     Sig: USE TO INJECT TESTOSTERONE ONCE EVERY 14 DAYS        Pharmacy where request should be sent: [id number] - [place], [place] [address] - [phone] [facility] [phone] FX     Additional details provided by patient    Does the patient have less than a 3 day supply:  [] Yes  [] No    Would you like a call back once the refill request has been completed: [] Yes [] No    If the office needs to give you a call back, can they leave a voicemail: [] Yes [] No    Tessy Ledesma Rep   02/20/23 14:42 EST           "

## 2023-02-20 NOTE — TELEPHONE ENCOUNTER
LVM to call back to office. Local derm is not accepting [place] medicaid new patients. [facility] is in [place], [place] that is accepting insurance.

## 2023-02-20 NOTE — TELEPHONE ENCOUNTER
[facility] will not accept as self pay,     [facility] will accept as self pay.

## 2023-02-21 DIAGNOSIS — L98.9 FACIAL SKIN LESION: ICD-10-CM

## 2023-02-21 DIAGNOSIS — L82.1 SEBORRHEIC KERATOSIS: Primary | ICD-10-CM

## 2023-03-08 ENCOUNTER — TELEPHONE (OUTPATIENT)
Dept: FAMILY MEDICINE CLINIC | Facility: CLINIC | Age: 63
End: 2023-03-08
Payer: MEDICAID

## 2023-03-08 NOTE — TELEPHONE ENCOUNTER
Caller: Jaron Espinoza    Relationship to patient: Self    Best call back number: [phone]    Patient is needing:     PATIENT SAID WHEN HE LAYS DOWN AND IS BREATHING, HE SAID THAT HE HEARS A BUBBLING NOISE AND A DEEP CREAK SOUND WHEN HE EXHALES     HE SAID HE DOESN'T FEEL LIKE HE HAS ANY TROUBLE BREATHING     HE SAID HE IS COUGHING UP GREEN MUCUS AND IS CONGESTED, AND GETS CHOCKED UP SOMEWHAT COUGHING, PATIENT WAS COUGHING WHILE ON THE PHONE WITH THE HUB      HE DOES NOT HAVE AN APPOINTMENT UNTIL 04/05     HE IS WANTING TO SEE WHAT MICHAEL CHAMPION WANTS HIM TO DO

## 2023-03-10 RX ORDER — CEFDINIR 300 MG/1
300 CAPSULE ORAL 2 TIMES DAILY
Qty: 20 CAPSULE | Refills: 0 | Status: SHIPPED | OUTPATIENT
Start: 2023-03-10 | End: 2023-03-20

## 2023-03-10 NOTE — TELEPHONE ENCOUNTER
Caller: Jaron Espinoza    Relationship: Self    Best call back number: [phone]    What was the call regarding: PATIENT HAS BEEN TAKING THE MUCINEX BUT IS STILL STICK AND COUGHING.   HE IS COUGHING UP GREEN MUCUS.  PLEASE ADVISE    PHARMACY: [facility] [id number] - [place], [place] - [address] - [phone]  - [phone] FX        Do you require a callback: YES

## 2023-03-17 DIAGNOSIS — E29.1 HYPOGONADISM, MALE: ICD-10-CM

## 2023-03-19 RX ORDER — TESTOSTERONE CYPIONATE 200 MG/ML
INJECTION, SOLUTION INTRAMUSCULAR
Qty: 6 ML | Refills: 1 | Status: SHIPPED | OUTPATIENT
Start: 2023-03-19

## 2023-04-05 ENCOUNTER — OFFICE VISIT (OUTPATIENT)
Dept: FAMILY MEDICINE CLINIC | Facility: CLINIC | Age: 63
End: 2023-04-05
Payer: MEDICAID

## 2023-04-05 VITALS
BODY MASS INDEX: 29.13 KG/M2 | DIASTOLIC BLOOD PRESSURE: 76 MMHG | SYSTOLIC BLOOD PRESSURE: 142 MMHG | OXYGEN SATURATION: 96 % | HEART RATE: 74 BPM | WEIGHT: 192.2 LBS | HEIGHT: 68 IN

## 2023-04-05 DIAGNOSIS — I10 PRIMARY HYPERTENSION: ICD-10-CM

## 2023-04-05 DIAGNOSIS — L29.0 RECTAL ITCHING: Primary | ICD-10-CM

## 2023-04-05 DIAGNOSIS — I10 ESSENTIAL HYPERTENSION: ICD-10-CM

## 2023-04-05 DIAGNOSIS — K21.9 GASTROESOPHAGEAL REFLUX DISEASE WITHOUT ESOPHAGITIS: ICD-10-CM

## 2023-04-05 DIAGNOSIS — E29.1 HYPOGONADISM IN MALE: ICD-10-CM

## 2023-04-05 DIAGNOSIS — R53.83 FATIGUE, UNSPECIFIED TYPE: ICD-10-CM

## 2023-04-05 PROCEDURE — 82306 VITAMIN D 25 HYDROXY: CPT | Performed by: NURSE PRACTITIONER

## 2023-04-05 PROCEDURE — 80061 LIPID PANEL: CPT | Performed by: NURSE PRACTITIONER

## 2023-04-05 PROCEDURE — 84403 ASSAY OF TOTAL TESTOSTERONE: CPT | Performed by: NURSE PRACTITIONER

## 2023-04-05 PROCEDURE — 80050 GENERAL HEALTH PANEL: CPT | Performed by: NURSE PRACTITIONER

## 2023-04-05 PROCEDURE — 82607 VITAMIN B-12: CPT | Performed by: NURSE PRACTITIONER

## 2023-04-05 RX ADMIN — TESTOSTERONE CYPIONATE 200 MG: 200 INJECTION, SOLUTION INTRAMUSCULAR at 14:35

## 2023-04-05 NOTE — PROGRESS NOTES
Venipuncture Blood Specimen Collection  Venipuncture performed in right arm by Hawa Feliciano MA with good hemostasis. Patient tolerated the procedure well without complications.   04/05/23   Hawa Feliciano MA      Injection  Injection performed in left ventrogluteal by Hawa Feliciano MA. Patient tolerated the procedure well without complications.  04/05/23   Hawa Feliciano MA

## 2023-04-05 NOTE — PROGRESS NOTES
Chief Complaint  Chief Complaint   Patient presents with   • testosterone shot   • Diarrhea     Pt says he has been watching medical shows about parasites and pt would like to be tested for parasites. 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  He now feels as if he has parasites as mentioned in chief complaint. He reports the rectum itches most nights but not every night, he cleans the rectum with soap and water several times/day. Denies abdominal pain, bloating, cramping, weight loss, rectal bleeding, and has not seen anything moving in his stool.  He denies being out of the country or consuming any abnormal foods or drinks. He has had diarrhea intermittently for the last couple of weeks, but diet has also been poor.    He reports bp has been running higher at home than today         The following portions of the patient's history were reviewed and updated as appropriate: allergies, current medications, past family history, past medical history, past social history, past surgical history and problem list.    Past Medical History:   Diagnosis Date   • Headache    • Hx of hypogonadism    • Hyperlipidemia    • Hypertension      Past Surgical History:   Procedure Laterality Date   • OTHER SURGICAL HISTORY  2012    [facility]- [place] [facility]     Family History   Problem Relation Age of Onset   • Cancer Mother      Social History     Tobacco Use   • Smoking status: Former     Packs/day: 0.50      "Years: 15.00     Pack years: 7.50     Types: Cigarettes     Quit date:      Years since quittin.2   • Smokeless tobacco: Never   • Tobacco comments:     quit x30 years   Substance Use Topics   • Alcohol use: No       Current Outpatient Medications:   •  lisinopril (PRINIVIL,ZESTRIL) 40 MG tablet, Take 1 tablet by mouth Daily., Disp: 90 tablet, Rfl: 1  •  multivitamin with minerals tablet tablet, Take 1 tablet by mouth Daily., Disp: , Rfl:   •  Syringe, Disposable, 3 ML misc, 1 mL Every 14 (Fourteen) Days., Disp: 2 each, Rfl: 4  •  Syringe/Needle, Disp, (B-D INTEGRA SYRINGE) 23G X 1\" 3 ML misc, USE TO INJECT TESTOSTERONE ONCE EVERY 14 DAYS, Disp: 12 each, Rfl: 0  •  Testosterone Cypionate (DEPOTESTOTERONE CYPIONATE) 200 MG/ML injection, INJECT 1 ML INTO THE APPROPRIATE MUSCLE EVERY 14 DAYS, Disp: 6 mL, Rfl: 1  •  atorvastatin (LIPITOR) 10 MG tablet, Take 1 tablet by mouth Every Night. (Patient not taking: Reported on 2023), Disp: 90 tablet, Rfl: 1  •  hydrocortisone 2.5 % cream, Apply 1 application topically to the appropriate area as directed 2 (Two) Times a Day., Disp: 20 g, Rfl: 0  •  metoprolol succinate XL (Toprol XL) 25 MG 24 hr tablet, Take 1 tablet by mouth Daily. (Patient not taking: Reported on 2023), Disp: 90 tablet, Rfl: 1    Current Facility-Administered Medications:   •  Testosterone Cypionate (DEPOTESTOTERONE CYPIONATE) injection 200 mg, 200 mg, Intramuscular, Q14 Days, Rina Colindres APRN, 200 mg at 23 1435    Objective   Vital Signs:   /76 (BP Location: Right arm, Patient Position: Sitting, Cuff Size: Large Adult)   Pulse 74   Ht 172.7 cm (67.99\")   Wt 87.2 kg (192 lb 3.2 oz)   SpO2 96%   BMI 29.23 kg/m²           Physical Exam  Constitutional:       General: He is not in acute distress.     Appearance: Normal appearance. He is obese. He is not ill-appearing.   Pulmonary:      Effort: Pulmonary effort is normal.   Abdominal:      General: Bowel sounds are normal. " There is no distension.      Tenderness: There is no abdominal tenderness.   Musculoskeletal:         General: Normal range of motion.      Cervical back: Normal range of motion.   Skin:     General: Skin is warm and dry.   Neurological:      General: No focal deficit present.      Mental Status: He is alert.   Psychiatric:         Attention and Perception: Attention normal.         Mood and Affect: Mood normal.         Behavior: Behavior normal.         Thought Content: Thought content is paranoid.         Judgment: Judgment normal.          Result Review :     Office Visit on 04/05/2023   Component Date Value Ref Range Status   • Total Cholesterol 04/05/2023 208 (H)  0 - 200 mg/dL Final   • Triglycerides 04/05/2023 252 (H)  0 - 150 mg/dL Final   • HDL Cholesterol 04/05/2023 37 (L)  40 - 60 mg/dL Final   • LDL Cholesterol  04/05/2023 126 (H)  0 - 100 mg/dL Final   • VLDL Cholesterol 04/05/2023 45 (H)  5 - 40 mg/dL Final   • LDL/HDL Ratio 04/05/2023 3.26   Final   • Glucose 04/05/2023 103 (H)  65 - 99 mg/dL Final   • BUN 04/05/2023 22  8 - 23 mg/dL Final   • Creatinine 04/05/2023 1.14  0.76 - 1.27 mg/dL Final   • Sodium 04/05/2023 143  136 - 145 mmol/L Final   • Potassium 04/05/2023 4.5  3.5 - 5.2 mmol/L Final   • Chloride 04/05/2023 109 (H)  98 - 107 mmol/L Final   • CO2 04/05/2023 23.8  22.0 - 29.0 mmol/L Final   • Calcium 04/05/2023 10.1  8.6 - 10.5 mg/dL Final   • Total Protein 04/05/2023 7.3  6.0 - 8.5 g/dL Final   • Albumin 04/05/2023 4.6  3.5 - 5.2 g/dL Final   • ALT (SGPT) 04/05/2023 27  1 - 41 U/L Final   • AST (SGOT) 04/05/2023 25  1 - 40 U/L Final   • Alkaline Phosphatase 04/05/2023 60  39 - 117 U/L Final   • Total Bilirubin 04/05/2023 0.6  0.0 - 1.2 mg/dL Final   • Globulin 04/05/2023 2.7  gm/dL Final   • A/G Ratio 04/05/2023 1.7  g/dL Final   • BUN/Creatinine Ratio 04/05/2023 19.3  7.0 - 25.0 Final   • Anion Gap 04/05/2023 10.2  5.0 - 15.0 mmol/L Final   • eGFR 04/05/2023 71.8  >60.0 mL/min/1.73 Final    • WBC 04/05/2023 5.40  3.40 - 10.80 10*3/mm3 Final   • RBC 04/05/2023 5.32  4.14 - 5.80 10*6/mm3 Final   • Hemoglobin 04/05/2023 16.0  13.0 - 17.7 g/dL Final   • Hematocrit 04/05/2023 46.9  37.5 - 51.0 % Final   • MCV 04/05/2023 88.2  79.0 - 97.0 fL Final   • MCH 04/05/2023 30.1  26.6 - 33.0 pg Final   • MCHC 04/05/2023 34.1  31.5 - 35.7 g/dL Final   • RDW 04/05/2023 12.8  12.3 - 15.4 % Final   • RDW-SD 04/05/2023 42.0  37.0 - 54.0 fl Final   • MPV 04/05/2023 11.0  6.0 - 12.0 fL Final   • Platelets 04/05/2023 254  140 - 450 10*3/mm3 Final   • TSH 04/05/2023 1.740  0.270 - 4.200 uIU/mL Final   • Testosterone, Total 04/05/2023 97.20 (L)  193.00 - 740.00 ng/dL Final   • 25 Hydroxy, Vitamin D 04/05/2023 28.4 (L)  30.0 - 100.0 ng/ml Final   • Vitamin B-12 04/05/2023 570  211 - 946 pg/mL Final                  BMI is >= 25 and <30. (Overweight) The following options were offered after discussion;: exercise counseling/recommendations and nutrition counseling/recommendations           Assessment and Plan    Diagnoses and all orders for this visit:    1. Rectal itching (Primary)    2. Hypogonadism in male  -     Lipid Panel  -     Comprehensive Metabolic Panel  -     CBC (No Diff)  -     TSH  -     Testosterone  -     Vitamin D,25-Hydroxy  -     Vitamin B12    3. Essential hypertension  -     Lipid Panel  -     Comprehensive Metabolic Panel  -     CBC (No Diff)  -     TSH  -     Testosterone  -     Vitamin D,25-Hydroxy  -     Vitamin B12    4. Gastroesophageal reflux disease without esophagitis    5. Primary hypertension    6. Fatigue, unspecified type  -     Lipid Panel  -     Comprehensive Metabolic Panel  -     CBC (No Diff)  -     TSH  -     Testosterone  -     Vitamin D,25-Hydroxy  -     Vitamin B12    Other orders  -     hydrocortisone 2.5 % cream; Apply 1 application topically to the appropriate area as directed 2 (Two) Times a Day.  Dispense: 20 g; Refill: 0       Labs today as ordered to further look into fatigue  and recheck T  Patient's symptoms do not correlate with a parasitic infection.   He has likely irritated the rectum by cleaning too frequently, would recommend trial of hydrocortisone 2.5% cream nightly  Reviewed previous labs with patient, pt should resume atorvastatin  Continue lisinopril, d/c verapamil and metoprolol for now, may need to resume  Patient to check BP at home and notify if consistently greater than 150 systolic  He denies symptoms of reflux, okay to DC omeprazole      I spent 30 minutes caring for Jaron Espinoza on this date of service. This time includes time spent by me in the following activities: preparing for the visit, reviewing tests, performing a medically appropriate examination and/or evaluation , counseling and educating the patient/family/caregiver, ordering medications, tests, or procedures and documenting information in the medical record        Follow Up     Return for Recheck, Next scheduled follow up on 5/10 for BP. cancel 5/3 labs.  Patient was given instructions and counseling regarding his condition or for health maintenance advice. Please see specific information pulled into the AVS if appropriate.        Part of this note may be an electronic transcription/translation of spoken language to printed text using the Dragon Dictation System

## 2023-04-06 ENCOUNTER — TELEPHONE (OUTPATIENT)
Dept: FAMILY MEDICINE CLINIC | Facility: CLINIC | Age: 63
End: 2023-04-06
Payer: MEDICAID

## 2023-04-06 LAB
25(OH)D3 SERPL-MCNC: 28.4 NG/ML (ref 30–100)
ALBUMIN SERPL-MCNC: 4.6 G/DL (ref 3.5–5.2)
ALBUMIN/GLOB SERPL: 1.7 G/DL
ALP SERPL-CCNC: 60 U/L (ref 39–117)
ALT SERPL W P-5'-P-CCNC: 27 U/L (ref 1–41)
ANION GAP SERPL CALCULATED.3IONS-SCNC: 10.2 MMOL/L (ref 5–15)
AST SERPL-CCNC: 25 U/L (ref 1–40)
BILIRUB SERPL-MCNC: 0.6 MG/DL (ref 0–1.2)
BUN SERPL-MCNC: 22 MG/DL (ref 8–23)
BUN/CREAT SERPL: 19.3 (ref 7–25)
CALCIUM SPEC-SCNC: 10.1 MG/DL (ref 8.6–10.5)
CHLORIDE SERPL-SCNC: 109 MMOL/L (ref 98–107)
CHOLEST SERPL-MCNC: 208 MG/DL (ref 0–200)
CO2 SERPL-SCNC: 23.8 MMOL/L (ref 22–29)
CREAT SERPL-MCNC: 1.14 MG/DL (ref 0.76–1.27)
DEPRECATED RDW RBC AUTO: 42 FL (ref 37–54)
EGFRCR SERPLBLD CKD-EPI 2021: 71.8 ML/MIN/1.73
ERYTHROCYTE [DISTWIDTH] IN BLOOD BY AUTOMATED COUNT: 12.8 % (ref 12.3–15.4)
GLOBULIN UR ELPH-MCNC: 2.7 GM/DL
GLUCOSE SERPL-MCNC: 103 MG/DL (ref 65–99)
HCT VFR BLD AUTO: 46.9 % (ref 37.5–51)
HDLC SERPL-MCNC: 37 MG/DL (ref 40–60)
HGB BLD-MCNC: 16 G/DL (ref 13–17.7)
LDLC SERPL CALC-MCNC: 126 MG/DL (ref 0–100)
LDLC/HDLC SERPL: 3.26 {RATIO}
MCH RBC QN AUTO: 30.1 PG (ref 26.6–33)
MCHC RBC AUTO-ENTMCNC: 34.1 G/DL (ref 31.5–35.7)
MCV RBC AUTO: 88.2 FL (ref 79–97)
PLATELET # BLD AUTO: 254 10*3/MM3 (ref 140–450)
PMV BLD AUTO: 11 FL (ref 6–12)
POTASSIUM SERPL-SCNC: 4.5 MMOL/L (ref 3.5–5.2)
PROT SERPL-MCNC: 7.3 G/DL (ref 6–8.5)
RBC # BLD AUTO: 5.32 10*6/MM3 (ref 4.14–5.8)
SODIUM SERPL-SCNC: 143 MMOL/L (ref 136–145)
TESTOST SERPL-MCNC: 97.2 NG/DL (ref 193–740)
TRIGL SERPL-MCNC: 252 MG/DL (ref 0–150)
TSH SERPL DL<=0.05 MIU/L-ACNC: 1.74 UIU/ML (ref 0.27–4.2)
VIT B12 BLD-MCNC: 570 PG/ML (ref 211–946)
VLDLC SERPL-MCNC: 45 MG/DL (ref 5–40)
WBC NRBC COR # BLD: 5.4 10*3/MM3 (ref 3.4–10.8)

## 2023-04-06 NOTE — TELEPHONE ENCOUNTER
Caller: Jaron Espinoza    Relationship: Self    Best call back number: [phone]    What is the best time to reach you: ANY TIME    Who are you requesting to speak with (clinical staff, provider,  specific staff member): CLINICAL STAFF    What was the call regarding: PATIENT CALLED STATING HE IS HAVING LEG PAIN IN THE FRONT OF HIS LEG AND RADIATING DOWN TO HIS TOES AFTER HIS TESTOSTERONE INJECTION YESTERDAY AND HE NORMALLY DOESN'T EXPERIENCE THIS PAIN AND WANTS TO KNOW IF THIS IS NORMAL.    PLEASE ADVISE    Do you require a callback: YES

## 2023-04-16 RX ORDER — ERGOCALCIFEROL 1.25 MG/1
CAPSULE ORAL
Qty: 15 CAPSULE | Refills: 1 | Status: SHIPPED | OUTPATIENT
Start: 2023-04-16 | End: 2023-06-27

## 2023-05-10 ENCOUNTER — OFFICE VISIT (OUTPATIENT)
Dept: FAMILY MEDICINE CLINIC | Facility: CLINIC | Age: 63
End: 2023-05-10
Payer: MEDICAID

## 2023-05-10 VITALS
BODY MASS INDEX: 29.34 KG/M2 | OXYGEN SATURATION: 96 % | HEART RATE: 73 BPM | HEIGHT: 68 IN | SYSTOLIC BLOOD PRESSURE: 132 MMHG | WEIGHT: 193.6 LBS | DIASTOLIC BLOOD PRESSURE: 86 MMHG

## 2023-05-10 DIAGNOSIS — E78.2 MIXED HYPERLIPIDEMIA: ICD-10-CM

## 2023-05-10 DIAGNOSIS — I10 ESSENTIAL HYPERTENSION: Primary | ICD-10-CM

## 2023-05-10 DIAGNOSIS — R51.9 HEADACHE IN BACK OF HEAD: ICD-10-CM

## 2023-05-10 DIAGNOSIS — K21.9 GASTROESOPHAGEAL REFLUX DISEASE WITHOUT ESOPHAGITIS: ICD-10-CM

## 2023-05-10 DIAGNOSIS — Z12.11 COLON CANCER SCREENING: ICD-10-CM

## 2023-05-10 DIAGNOSIS — E29.1 HYPOGONADISM IN MALE: ICD-10-CM

## 2023-05-10 DIAGNOSIS — R07.9 CHEST PAIN, UNSPECIFIED TYPE: ICD-10-CM

## 2023-05-10 PROCEDURE — 1159F MED LIST DOCD IN RCRD: CPT | Performed by: NURSE PRACTITIONER

## 2023-05-10 PROCEDURE — 99214 OFFICE O/P EST MOD 30 MIN: CPT | Performed by: NURSE PRACTITIONER

## 2023-05-10 PROCEDURE — 1160F RVW MEDS BY RX/DR IN RCRD: CPT | Performed by: NURSE PRACTITIONER

## 2023-05-10 PROCEDURE — 3079F DIAST BP 80-89 MM HG: CPT | Performed by: NURSE PRACTITIONER

## 2023-05-10 PROCEDURE — 3075F SYST BP GE 130 - 139MM HG: CPT | Performed by: NURSE PRACTITIONER

## 2023-05-10 RX ORDER — NEEDLES, FILTER 19GX1 1/2"
NEEDLE, DISPOSABLE MISCELLANEOUS
Qty: 12 EACH | Refills: 3 | Status: SHIPPED | OUTPATIENT
Start: 2023-05-10

## 2023-05-10 RX ORDER — METOPROLOL SUCCINATE 25 MG/1
25 TABLET, EXTENDED RELEASE ORAL DAILY
Qty: 90 TABLET | Refills: 1 | Status: SHIPPED | OUTPATIENT
Start: 2023-05-10

## 2023-05-10 RX ORDER — LISINOPRIL 40 MG/1
40 TABLET ORAL DAILY
Qty: 90 TABLET | Refills: 1 | Status: SHIPPED | OUTPATIENT
Start: 2023-05-10

## 2023-05-10 RX ORDER — ATORVASTATIN CALCIUM 10 MG/1
10 TABLET, FILM COATED ORAL NIGHTLY
Qty: 90 TABLET | Refills: 1 | Status: SHIPPED | OUTPATIENT
Start: 2023-05-10

## 2023-05-10 NOTE — PROGRESS NOTES
If increasingChief Complaint  Chief Complaint   Patient presents with   • Follow-up     6 month follow up for HTN and discuss recent blood work. Pt brought log of recent bp's    • Headache     Pt says he has been having headaches in the back of his head almost daily. Pt says he has seen specialist for this in the past but the medication he was given made headaches worse so he stopped taking it and stopped seeing specialist. Pt says bp sometimes runs high and he feels like his vision is not as well as it has been in the past. Pt says he drinks caffeine frequently as well            Subjective          Jaron Espinoza presents to [facility] for   History of Present Illness     HTN, taking only lisinopril now, he presents a bp log today 158/99, 165/106, 162/102, 169/101, 132/64, 120/70, HR58-73. He reports occasional chest discomfort with activity or exertion which resolves when he rests, frequent headaches and blurry vision, he was told he was breathing heavy the other day, but denies soa. He denies chest pain, palpitations and swelling of extremities.     Hyperlipidemia, he stopped atorvastatin, was recommended last appt to resume. The patient denies muscle aches, constipation, diarrhea, GI upset, fatigue, chest pain/pressure, exercise intolerance, dyspnea, palpitations, syncope and pedal edema.      Headaches, as mentioned in chief complaint    GERD, stable on medication, denies nausea, vomiting, constipation, abdominal pain and diarrhea.    Vitamin D deficiency on weekly vitamin D     Hypogonadism, on testosterone every 2 weeks    Rectal itching resolved, now using hydrocortisone prn      The following portions of the patient's history were reviewed and updated as appropriate: allergies, current medications, past family history, past medical history, past social history, past surgical history and problem list.    Past Medical History:   Diagnosis Date   • Headache    • Hx of  "hypogonadism    • Hyperlipidemia    • Hypertension      Past Surgical History:   Procedure Laterality Date   • OTHER SURGICAL HISTORY      [facility]- [place] [facility]     Family History   Problem Relation Age of Onset   • Cancer Mother      Social History     Tobacco Use   • Smoking status: Former     Packs/day: 0.50     Years: 15.00     Pack years: 7.50     Types: Cigarettes     Quit date:      Years since quittin.3   • Smokeless tobacco: Never   • Tobacco comments:     quit x30 years   Substance Use Topics   • Alcohol use: No       Current Outpatient Medications:   •  atorvastatin (LIPITOR) 10 MG tablet, Take 1 tablet by mouth Every Night., Disp: 90 tablet, Rfl: 1  •  hydrocortisone 2.5 % cream, Apply 1 application topically to the appropriate area as directed 2 (Two) Times a Day., Disp: 20 g, Rfl: 0  •  lisinopril (PRINIVIL,ZESTRIL) 40 MG tablet, Take 1 tablet by mouth Daily., Disp: 90 tablet, Rfl: 1  •  metoprolol succinate XL (Toprol XL) 25 MG 24 hr tablet, Take 1 tablet by mouth Daily., Disp: 90 tablet, Rfl: 1  •  multivitamin with minerals tablet tablet, Take 1 tablet by mouth Daily., Disp: , Rfl:   •  Syringe, Disposable, 3 ML misc, 1 mL Every 14 (Fourteen) Days., Disp: 2 each, Rfl: 4  •  Syringe/Needle, Disp, (B-D INTEGRA SYRINGE) 23G X 1\" 3 ML misc, USE TO INJECT TESTOSTERONE ONCE EVERY 14 DAYS, Disp: 12 each, Rfl: 3  •  Testosterone Cypionate (DEPOTESTOTERONE CYPIONATE) 200 MG/ML injection, INJECT 1 ML INTO THE APPROPRIATE MUSCLE EVERY 14 DAYS, Disp: 6 mL, Rfl: 1  •  vitamin D (ERGOCALCIFEROL) 1.25 MG (90154 UT) capsule capsule, Take 1 po daily for 3 days then once weekly on , Disp: 15 capsule, Rfl: 1    Current Facility-Administered Medications:   •  Testosterone Cypionate (DEPOTESTOTERONE CYPIONATE) injection 200 mg, 200 mg, Intramuscular, Q14 Days, Rina Colindres APRN, 200 mg at 23 1435    Objective   Vital Signs:   /86 (BP Location: Left arm, Patient Position: Sitting, Cuff Size: " "Adult)   Pulse 73   Ht 172.7 cm (67.99\")   Wt 87.8 kg (193 lb 9.6 oz)   SpO2 96%   BMI 29.44 kg/m²           Physical Exam  Constitutional:       General: He is not in acute distress.     Appearance: Normal appearance. He is obese. He is not ill-appearing.   HENT:      Head: Normocephalic.   Cardiovascular:      Rate and Rhythm: Normal rate.      Heart sounds: No murmur heard.  Pulmonary:      Effort: Pulmonary effort is normal.   Abdominal:      General: Bowel sounds are normal. There is no distension.      Tenderness: There is no abdominal tenderness.   Musculoskeletal:         General: No swelling. Normal range of motion.      Cervical back: Normal range of motion.   Skin:     General: Skin is warm and dry.   Neurological:      General: No focal deficit present.      Mental Status: He is alert.   Psychiatric:         Attention and Perception: Attention normal.         Mood and Affect: Mood normal.         Behavior: Behavior normal.         Thought Content: Thought content is paranoid.         Judgment: Judgment normal.          Result Review :     No visits with results within 7 Day(s) from this visit.   Latest known visit with results is:   Office Visit on 04/05/2023   Component Date Value Ref Range Status   • Total Cholesterol 04/05/2023 208 (H)  0 - 200 mg/dL Final   • Triglycerides 04/05/2023 252 (H)  0 - 150 mg/dL Final   • HDL Cholesterol 04/05/2023 37 (L)  40 - 60 mg/dL Final   • LDL Cholesterol  04/05/2023 126 (H)  0 - 100 mg/dL Final   • VLDL Cholesterol 04/05/2023 45 (H)  5 - 40 mg/dL Final   • LDL/HDL Ratio 04/05/2023 3.26   Final   • Glucose 04/05/2023 103 (H)  65 - 99 mg/dL Final   • BUN 04/05/2023 22  8 - 23 mg/dL Final   • Creatinine 04/05/2023 1.14  0.76 - 1.27 mg/dL Final   • Sodium 04/05/2023 143  136 - 145 mmol/L Final   • Potassium 04/05/2023 4.5  3.5 - 5.2 mmol/L Final   • Chloride 04/05/2023 109 (H)  98 - 107 mmol/L Final   • CO2 04/05/2023 23.8  22.0 - 29.0 mmol/L Final   • Calcium " 04/05/2023 10.1  8.6 - 10.5 mg/dL Final   • Total Protein 04/05/2023 7.3  6.0 - 8.5 g/dL Final   • Albumin 04/05/2023 4.6  3.5 - 5.2 g/dL Final   • ALT (SGPT) 04/05/2023 27  1 - 41 U/L Final   • AST (SGOT) 04/05/2023 25  1 - 40 U/L Final   • Alkaline Phosphatase 04/05/2023 60  39 - 117 U/L Final   • Total Bilirubin 04/05/2023 0.6  0.0 - 1.2 mg/dL Final   • Globulin 04/05/2023 2.7  gm/dL Final   • A/G Ratio 04/05/2023 1.7  g/dL Final   • BUN/Creatinine Ratio 04/05/2023 19.3  7.0 - 25.0 Final   • Anion Gap 04/05/2023 10.2  5.0 - 15.0 mmol/L Final   • eGFR 04/05/2023 71.8  >60.0 mL/min/1.73 Final   • WBC 04/05/2023 5.40  3.40 - 10.80 10*3/mm3 Final   • RBC 04/05/2023 5.32  4.14 - 5.80 10*6/mm3 Final   • Hemoglobin 04/05/2023 16.0  13.0 - 17.7 g/dL Final   • Hematocrit 04/05/2023 46.9  37.5 - 51.0 % Final   • MCV 04/05/2023 88.2  79.0 - 97.0 fL Final   • MCH 04/05/2023 30.1  26.6 - 33.0 pg Final   • MCHC 04/05/2023 34.1  31.5 - 35.7 g/dL Final   • RDW 04/05/2023 12.8  12.3 - 15.4 % Final   • RDW-SD 04/05/2023 42.0  37.0 - 54.0 fl Final   • MPV 04/05/2023 11.0  6.0 - 12.0 fL Final   • Platelets 04/05/2023 254  140 - 450 10*3/mm3 Final   • TSH 04/05/2023 1.740  0.270 - 4.200 uIU/mL Final   • Testosterone, Total 04/05/2023 97.20 (L)  193.00 - 740.00 ng/dL Final   • 25 Hydroxy, Vitamin D 04/05/2023 28.4 (L)  30.0 - 100.0 ng/ml Final   • Vitamin B-12 04/05/2023 570  211 - 946 pg/mL Final                  BMI is >= 25 and <30. (Overweight) The following options were offered after discussion;: exercise counseling/recommendations and nutrition counseling/recommendations           Assessment and Plan    Diagnoses and all orders for this visit:    1. Essential hypertension (Primary)  Comments:  bp log reviewed   rec restart metop, continue lisinopril  call for sbp consistently >160.   Orders:  -     Treadmill Stress Test; Future  -     lisinopril (PRINIVIL,ZESTRIL) 40 MG tablet; Take 1 tablet by mouth Daily.  Dispense: 90 tablet;  "Refill: 1  -     metoprolol succinate XL (Toprol XL) 25 MG 24 hr tablet; Take 1 tablet by mouth Daily.  Dispense: 90 tablet; Refill: 1    2. Headache in back of head  Comments:  kedar 2/2 HTN  ok for prn tylenol and control BP    3. Hypogonadism in male  Comments:  cont q2 weeks testosterone IM  Orders:  -     Syringe/Needle, Disp, (B-D INTEGRA SYRINGE) 23G X 1\" 3 ML misc; USE TO INJECT TESTOSTERONE ONCE EVERY 14 DAYS  Dispense: 12 each; Refill: 3    4. Mixed hyperlipidemia  Comments:  reviewed labs, has not restarted atorvastatin  recommend resume atorvastatin    5. Gastroesophageal reflux disease without esophagitis  Comments:  stable not on medication    6. Colon cancer screening  -     Cologuard - Stool, Per Rectum; Future    7. Chest pain, unspecified type  Comments:  check treadmill stress test  Orders:  -     Treadmill Stress Test; Future    Other orders  -     atorvastatin (LIPITOR) 10 MG tablet; Take 1 tablet by mouth Every Night.  Dispense: 90 tablet; Refill: 1        I spent 30 minutes caring for Jaron Espinoza on this date of service. This time includes time spent by me in the following activities: preparing for the visit, reviewing tests, performing a medically appropriate examination and/or evaluation , counseling and educating the patient/family/caregiver, ordering medications, tests, or procedures and documenting information in the medical record        Follow Up     Return in about 6 months (around 11/10/2023) for Recheck. HTN panel, vitamin D, B12 and total Testosterone, PSA prior to appt, Annual physical.  Patient was given instructions and counseling regarding his condition or for health maintenance advice. Please see specific information pulled into the AVS if appropriate.        Part of this note may be an electronic transcription/translation of spoken language to printed text using the Dragon Dictation System  "

## 2023-06-27 PROBLEM — G45.9 TRANSIENT ISCHEMIC ATTACK (TIA): Status: ACTIVE | Noted: 2023-06-27

## 2023-06-28 PROBLEM — I67.4 HYPERTENSIVE ENCEPHALOPATHY: Status: ACTIVE | Noted: 2023-06-28

## 2023-07-26 RX ORDER — CLONIDINE HYDROCHLORIDE 0.1 MG/1
TABLET ORAL
Qty: 90 TABLET | Refills: 0 | Status: SHIPPED | OUTPATIENT
Start: 2023-07-26

## 2023-08-15 ENCOUNTER — OFFICE VISIT (OUTPATIENT)
Dept: FAMILY MEDICINE CLINIC | Facility: CLINIC | Age: 63
End: 2023-08-15
Payer: MEDICAID

## 2023-08-15 VITALS
DIASTOLIC BLOOD PRESSURE: 92 MMHG | WEIGHT: 191.6 LBS | SYSTOLIC BLOOD PRESSURE: 166 MMHG | BODY MASS INDEX: 25.95 KG/M2 | HEART RATE: 54 BPM | HEIGHT: 72 IN | OXYGEN SATURATION: 98 %

## 2023-08-15 DIAGNOSIS — L73.1 INGROWING HAIR: ICD-10-CM

## 2023-08-15 DIAGNOSIS — E29.1 HYPOGONADISM IN MALE: ICD-10-CM

## 2023-08-15 DIAGNOSIS — L29.0 RECTAL ITCHING: ICD-10-CM

## 2023-08-15 DIAGNOSIS — K64.4 EXTERNAL HEMORRHOIDS: Primary | ICD-10-CM

## 2023-08-15 DIAGNOSIS — I10 ESSENTIAL HYPERTENSION: ICD-10-CM

## 2023-08-15 RX ORDER — CLONIDINE HYDROCHLORIDE 0.1 MG/1
TABLET ORAL
Qty: 90 TABLET | Refills: 0 | Status: SHIPPED | OUTPATIENT
Start: 2023-08-15

## 2023-08-15 RX ORDER — METOPROLOL SUCCINATE 25 MG/1
TABLET, EXTENDED RELEASE ORAL
COMMUNITY
Start: 2023-08-05 | End: 2023-08-15

## 2023-08-15 RX ORDER — METOPROLOL SUCCINATE 50 MG/1
50 TABLET, EXTENDED RELEASE ORAL DAILY
Qty: 90 TABLET | Refills: 1
Start: 2023-08-15

## 2023-08-15 RX ADMIN — TESTOSTERONE CYPIONATE 200 MG: 200 INJECTION, SOLUTION INTRAMUSCULAR at 12:15

## 2023-08-15 NOTE — PROGRESS NOTES
Chief Complaint  Chief Complaint   Patient presents with    Mass     Left groin area and pt still complains of rectal itching           Subjective          Jaron Espinoza presents to [facility] for   History of Present Illness      He complains of a bump that has been present on the groin of left leg    Needs T shot today, has not had for a few weeks    Rectum still itching only at night, he is using hydrocortisone cream prn that is effective    HTN, has not been checking at home, had irritating situation earlier, he overall feels stable on metoprolol 50 mg daily and lisinopril 40 mg daily, takes daily as directed, denies chest pain, headache, shortness of air, palpitations and swelling of extremities.       The following portions of the patient's history were reviewed and updated as appropriate: allergies, current medications, past family history, past medical history, past social history, past surgical history and problem list.    Past Medical History:   Diagnosis Date    Headache     Hx of hypogonadism     Hyperlipidemia     Hypertension      Past Surgical History:   Procedure Laterality Date    OTHER SURGICAL HISTORY      TIA- [place]     Family History   Problem Relation Age of Onset    Cancer Mother      Social History     Tobacco Use    Smoking status: Former     Packs/day: 0.50     Years: 15.00     Pack years: 7.50     Types: Cigarettes     Quit date:      Years since quittin.6     Passive exposure: Past    Smokeless tobacco: Never    Tobacco comments:     quit x30 years   Substance Use Topics    Alcohol use: No       Current Outpatient Medications:     aspirin 81 MG chewable tablet, Chew 1 tablet Daily., Disp: 30 tablet, Rfl: 0    atorvastatin (LIPITOR) 10 MG tablet, Take 1 tablet by mouth Every Night., Disp: 90 tablet, Rfl: 1    cloNIDine (CATAPRES) 0.1 MG tablet, TAKE 1 TABLET BY MOUTH EVERY 6 HOURS AS NEEDED FOR HIGH BLOOD PRESSURE (SYSTOLIC OVER 160),  "Disp: 90 tablet, Rfl: 0    hydrocortisone 2.5 % cream, Apply 1 application  topically to the appropriate area as directed 2 (Two) Times a Day As Needed., Disp: , Rfl:     lisinopril (PRINIVIL,ZESTRIL) 40 MG tablet, Take 1 tablet by mouth Daily., Disp: 90 tablet, Rfl: 1    metoprolol succinate XL (Toprol XL) 50 MG 24 hr tablet, Take 1 tablet by mouth Daily., Disp: 90 tablet, Rfl: 1    multivitamin with minerals tablet tablet, Take 1 tablet by mouth Daily., Disp: , Rfl:     Syringe, Disposable, 3 ML misc, 1 mL Every 14 (Fourteen) Days., Disp: 2 each, Rfl: 4    Syringe/Needle, Disp, (B-D INTEGRA SYRINGE) 23G X 1\" 3 ML misc, USE TO INJECT TESTOSTERONE ONCE EVERY 14 DAYS, Disp: 12 each, Rfl: 3    Testosterone Cypionate 200 MG/ML solution, Inject 1 mL as directed Every 14 (Fourteen) Days., Disp: , Rfl:     vitamin D (ERGOCALCIFEROL) 1.25 MG (93474 UT) capsule capsule, Take 1 capsule by mouth 1 (One) Time Per Week. Mondays, Disp: , Rfl:     Current Facility-Administered Medications:     Testosterone Cypionate (DEPOTESTOTERONE CYPIONATE) injection 200 mg, 200 mg, Intramuscular, Q14 Days, Rina Colindres APRN, 200 mg at 08/15/23 1215    Objective   Vital Signs:   /92   Pulse 54   Ht 182.9 cm (72.01\")   Wt 86.9 kg (191 lb 9.6 oz)   SpO2 98%   BMI 25.98 kg/mý           Physical Exam  Constitutional:       General: He is not in acute distress.     Appearance: Normal appearance.   Pulmonary:      Effort: Pulmonary effort is normal.   Genitourinary:     Rectum: External hemorrhoid present. No mass, tenderness or anal fissure. Normal anal tone.   Musculoskeletal:         General: Normal range of motion.      Cervical back: Normal range of motion.   Skin:     General: Skin is warm and dry.      Findings: Lesion (ingrowing hair left upper leg/groin region, scabbed, no abscess or erythema) present.   Neurological:      General: No focal deficit present.      Mental Status: He is alert.   Psychiatric:         Mood and " Affect: Mood normal.         Behavior: Behavior normal.         Thought Content: Thought content normal.         Judgment: Judgment normal.        Result Review :     No visits with results within 7 Day(s) from this visit.   Latest known visit with results is:   Admission on 06/27/2023, Discharged on 06/28/2023   Component Date Value Ref Range Status    Color, UA 06/27/2023 Yellow  Yellow, Straw Final    Appearance, UA 06/27/2023 Clear  Clear Final    pH, UA 06/27/2023 <=5.0  5.0 - 8.0 Final    Specific Gravity, UA 06/27/2023 1.014  1.005 - 1.030 Final    Glucose, UA 06/27/2023 Negative  Negative Final    Ketones, UA 06/27/2023 Negative  Negative Final    Bilirubin, UA 06/27/2023 Negative  Negative Final    Blood, UA 06/27/2023 Negative  Negative Final    Protein, UA 06/27/2023 Negative  Negative Final    Leuk Esterase, UA 06/27/2023 Negative  Negative Final    Nitrite, UA 06/27/2023 Negative  Negative Final    Urobilinogen, UA 06/27/2023 0.2 E.U./dL  0.2 - 1.0 E.U./dL Final    Protime 06/27/2023 10.4  9.6 - 11.7 Seconds Final    INR 06/27/2023 0.97  0.93 - 1.10 Final    PTT 06/27/2023 27.4 (L)  61.0 - 76.5 seconds Final    Glucose 06/27/2023 102 (H)  65 - 99 mg/dL Final    BUN 06/27/2023 23  8 - 23 mg/dL Final    Creatinine 06/27/2023 0.95  0.76 - 1.27 mg/dL Final    Sodium 06/27/2023 141  136 - 145 mmol/L Final    Potassium 06/27/2023 4.5  3.5 - 5.2 mmol/L Final    Chloride 06/27/2023 108 (H)  98 - 107 mmol/L Final    CO2 06/27/2023 21.0 (L)  22.0 - 29.0 mmol/L Final    Calcium 06/27/2023 9.1  8.6 - 10.5 mg/dL Final    Total Protein 06/27/2023 7.0  6.0 - 8.5 g/dL Final    Albumin 06/27/2023 4.2  3.5 - 5.2 g/dL Final    ALT (SGPT) 06/27/2023 27  1 - 41 U/L Final    AST (SGOT) 06/27/2023 24  1 - 40 U/L Final    Alkaline Phosphatase 06/27/2023 60  39 - 117 U/L Final    Total Bilirubin 06/27/2023 0.4  0.0 - 1.2 mg/dL Final    Globulin 06/27/2023 2.8  gm/dL Final    A/G Ratio 06/27/2023 1.5  g/dL Final     BUN/Creatinine Ratio 06/27/2023 24.2  7.0 - 25.0 Final    Anion Gap 06/27/2023 12.0  5.0 - 15.0 mmol/L Final    eGFR 06/27/2023 89.4  >60.0 mL/min/1.73 Final    WBC 06/27/2023 5.50  3.40 - 10.80 10*3/mm3 Final    RBC 06/27/2023 5.47  4.14 - 5.80 10*6/mm3 Final    Hemoglobin 06/27/2023 16.2  13.0 - 17.7 g/dL Final    Hematocrit 06/27/2023 49.0  37.5 - 51.0 % Final    MCV 06/27/2023 89.5  79.0 - 97.0 fL Final    MCH 06/27/2023 29.7  26.6 - 33.0 pg Final    MCHC 06/27/2023 33.1  31.5 - 35.7 g/dL Final    RDW 06/27/2023 13.2  12.3 - 15.4 % Final    RDW-SD 06/27/2023 40.7  37.0 - 54.0 fl Final    MPV 06/27/2023 8.4  6.0 - 12.0 fL Final    Platelets 06/27/2023 238  140 - 450 10*3/mm3 Final    Neutrophil % 06/27/2023 54.4  42.7 - 76.0 % Final    Lymphocyte % 06/27/2023 37.0  19.6 - 45.3 % Final    Monocyte % 06/27/2023 6.1  5.0 - 12.0 % Final    Eosinophil % 06/27/2023 1.8  0.3 - 6.2 % Final    Basophil % 06/27/2023 0.7  0.0 - 1.5 % Final    Neutrophils, Absolute 06/27/2023 3.00  1.70 - 7.00 10*3/mm3 Final    Lymphocytes, Absolute 06/27/2023 2.00  0.70 - 3.10 10*3/mm3 Final    Monocytes, Absolute 06/27/2023 0.30  0.10 - 0.90 10*3/mm3 Final    Eosinophils, Absolute 06/27/2023 0.10  0.00 - 0.40 10*3/mm3 Final    Basophils, Absolute 06/27/2023 0.00  0.00 - 0.20 10*3/mm3 Final    nRBC 06/27/2023 0.1  0.0 - 0.2 /100 WBC Final    TSH 06/27/2023 2.690  0.270 - 4.200 uIU/mL Final    Total Cholesterol 06/27/2023 146  0 - 200 mg/dL Final    Triglycerides 06/27/2023 145  0 - 150 mg/dL Final    HDL Cholesterol 06/27/2023 40  40 - 60 mg/dL Final    LDL Cholesterol  06/27/2023 81  0 - 100 mg/dL Final    VLDL Cholesterol 06/27/2023 25  5 - 40 mg/dL Final    LDL/HDL Ratio 06/27/2023 1.93   Final    Vitamin B-12 06/27/2023 550  211 - 946 pg/mL Final    Hemoglobin A1C 06/27/2023 5.70 (H)  4.80 - 5.60 % Final    Glucose 06/27/2023 84  65 - 99 mg/dL Final    BUN 06/27/2023 21  8 - 23 mg/dL Final    Creatinine 06/27/2023 1.03  0.76 - 1.27  mg/dL Final    Sodium 06/27/2023 139  136 - 145 mmol/L Final    Potassium 06/27/2023 4.0  3.5 - 5.2 mmol/L Final    Chloride 06/27/2023 105  98 - 107 mmol/L Final    CO2 06/27/2023 22.0  22.0 - 29.0 mmol/L Final    Calcium 06/27/2023 8.9  8.6 - 10.5 mg/dL Final    BUN/Creatinine Ratio 06/27/2023 20.4  7.0 - 25.0 Final    Anion Gap 06/27/2023 12.0  5.0 - 15.0 mmol/L Final    eGFR 06/27/2023 81.1  >60.0 mL/min/1.73 Final    WBC 06/27/2023 6.60  3.40 - 10.80 10*3/mm3 Final    RBC 06/27/2023 5.12  4.14 - 5.80 10*6/mm3 Final    Hemoglobin 06/27/2023 15.2  13.0 - 17.7 g/dL Final    Hematocrit 06/27/2023 44.9  37.5 - 51.0 % Final    MCV 06/27/2023 87.6  79.0 - 97.0 fL Final    MCH 06/27/2023 29.8  26.6 - 33.0 pg Final    MCHC 06/27/2023 34.0  31.5 - 35.7 g/dL Final    RDW 06/27/2023 12.9  12.3 - 15.4 % Final    RDW-SD 06/27/2023 41.6  37.0 - 54.0 fl Final    MPV 06/27/2023 8.6  6.0 - 12.0 fL Final    Platelets 06/27/2023 238  140 - 450 10*3/mm3 Final    Neutrophil % 06/27/2023 50.6  42.7 - 76.0 % Final    Lymphocyte % 06/27/2023 38.9  19.6 - 45.3 % Final    Monocyte % 06/27/2023 7.9  5.0 - 12.0 % Final    Eosinophil % 06/27/2023 2.0  0.3 - 6.2 % Final    Basophil % 06/27/2023 0.6  0.0 - 1.5 % Final    Neutrophils, Absolute 06/27/2023 3.30  1.70 - 7.00 10*3/mm3 Final    Lymphocytes, Absolute 06/27/2023 2.60  0.70 - 3.10 10*3/mm3 Final    Monocytes, Absolute 06/27/2023 0.50  0.10 - 0.90 10*3/mm3 Final    Eosinophils, Absolute 06/27/2023 0.10  0.00 - 0.40 10*3/mm3 Final    Basophils, Absolute 06/27/2023 0.00  0.00 - 0.20 10*3/mm3 Final    nRBC 06/27/2023 0.2  0.0 - 0.2 /100 WBC Final    Prox CCA PSV 06/28/2023 96.3  cm/sec Final    Prox CCA EDV 06/28/2023 16.8  cm/sec Final    Dist CCA PSV 06/28/2023 -89.5  cm/sec Final    Dist CCA EDV 06/28/2023 -18.0  cm/sec Final    Prox ICA PSV 06/28/2023 -106.0  cm/sec Final    Prox ICA EDV 06/28/2023 -29.1  cm/sec Final    Dist ICA PSV 06/28/2023 -96.8  cm/sec Final    Dist ICA EDV  06/28/2023 -29.1  cm/sec Final    Prox ECA PSV 06/28/2023 -152.0  cm/sec Final    Vertebral A PSV 06/28/2023 -38.3  cm/sec Final    Prox SCLA PSV 06/28/2023 145.0  cm/sec Final    ICA/CCA ratio 06/28/2023 -1.10   Final    Prox CCA PSV 06/28/2023 126.0  cm/sec Final    Prox CCA EDV 06/28/2023 31.7  cm/sec Final    Dist CCA PSV 06/28/2023 -114.0  cm/sec Final    Dist CCA EDV 06/28/2023 -20.8  cm/sec Final    Prox ICA PSV 06/28/2023 -75.8  cm/sec Final    Prox ICA EDV 06/28/2023 -19.9  cm/sec Final    Dist ICA PSV 06/28/2023 -78.9  cm/sec Final    Dist ICA EDV 06/28/2023 -15.5  cm/sec Final    Prox ECA PSV 06/28/2023 -144.0  cm/sec Final    Vertebral A PSV 06/28/2023 -51.7  cm/sec Final    Prox SCLA PSV 06/28/2023 155.0  cm/sec Final    ICA/CCA ratio 06/28/2023 -0.63   Final    Right arm BP 06/28/2023 151/77  mmHg Final    Left arm BP 06/28/2023 143/89  mmHg Final                              Assessment and Plan    Diagnoses and all orders for this visit:    1. External hemorrhoids (Primary)  Comments:  continue hydrocortisone 2.5% prn or try recticare cream otc  hemorrhoids not inflamed or bleeding, no need for referral at this time    2. Ingrowing hair  Comments:  of left groin, self limiting, stop picking and clean daily with alchol    3. Hypogonadism in male  Comments:  T injection today    4. Rectal itching  Comments:  d/t hemorrhoids, cont cream prn    5. Essential hypertension  Comments:  bp log reviewed, still with elevation  cont metop and lisinopril  add clonidine prn for sbp >160  Goal less than 140/90  Orders:  -     metoprolol succinate XL (Toprol XL) 50 MG 24 hr tablet; Take 1 tablet by mouth Daily.  Dispense: 90 tablet; Refill: 1        I spent 30 minutes caring for Jaron Espinoza on this date of service. This time includes time spent by me in the following activities: preparing for the visit, reviewing tests, performing a medically appropriate examination and/or evaluation , counseling and  educating the patient/family/caregiver, ordering medications, tests, or procedures and documenting information in the medical record        Follow Up     Return if symptoms worsen or fail to improve, for Next scheduled follow up.  Patient was given instructions and counseling regarding his condition or for health maintenance advice. Please see specific information pulled into the AVS if appropriate.        Part of this note may be an electronic transcription/translation of spoken language to printed text using the Dragon Dictation System

## 2023-09-05 RX ORDER — CLONIDINE HYDROCHLORIDE 0.1 MG/1
TABLET ORAL
Qty: 90 TABLET | Refills: 0 | Status: SHIPPED | OUTPATIENT
Start: 2023-09-05

## 2023-09-29 RX ORDER — ERGOCALCIFEROL 1.25 MG/1
CAPSULE ORAL
Qty: 15 CAPSULE | Refills: 1 | Status: SHIPPED | OUTPATIENT
Start: 2023-09-29

## 2023-10-01 DIAGNOSIS — I10 PRIMARY HYPERTENSION: Primary | ICD-10-CM

## 2023-10-02 RX ORDER — CLONIDINE HYDROCHLORIDE 0.1 MG/1
TABLET ORAL
Qty: 90 TABLET | Refills: 1 | Status: SHIPPED | OUTPATIENT
Start: 2023-10-02

## 2023-10-30 DIAGNOSIS — E29.1 HYPOGONADISM IN MALE: Primary | ICD-10-CM

## 2023-10-30 RX ORDER — TESTOSTERONE CYPIONATE 200 MG/ML
200 INJECTION, SOLUTION INTRAMUSCULAR
Qty: 6 ML | Refills: 0 | Status: SHIPPED | OUTPATIENT
Start: 2023-10-30

## 2023-11-01 DIAGNOSIS — E29.1 HYPOGONADISM, MALE: ICD-10-CM

## 2023-11-01 DIAGNOSIS — E29.1 HYPOGONADISM IN MALE: Primary | ICD-10-CM

## 2023-11-01 DIAGNOSIS — I10 PRIMARY HYPERTENSION: ICD-10-CM

## 2023-11-01 DIAGNOSIS — Z12.5 PROSTATE CANCER SCREENING: ICD-10-CM

## 2023-11-01 DIAGNOSIS — E78.2 MIXED HYPERLIPIDEMIA: ICD-10-CM

## 2023-11-07 ENCOUNTER — CLINICAL SUPPORT (OUTPATIENT)
Dept: FAMILY MEDICINE CLINIC | Facility: CLINIC | Age: 63
End: 2023-11-07
Payer: MEDICAID

## 2023-11-07 ENCOUNTER — TELEPHONE (OUTPATIENT)
Dept: FAMILY MEDICINE CLINIC | Facility: CLINIC | Age: 63
End: 2023-11-07

## 2023-11-07 DIAGNOSIS — I10 PRIMARY HYPERTENSION: Primary | ICD-10-CM

## 2023-11-07 PROCEDURE — 36415 COLL VENOUS BLD VENIPUNCTURE: CPT | Performed by: NURSE PRACTITIONER

## 2023-11-07 PROCEDURE — 80061 LIPID PANEL: CPT | Performed by: NURSE PRACTITIONER

## 2023-11-07 PROCEDURE — 80050 GENERAL HEALTH PANEL: CPT | Performed by: NURSE PRACTITIONER

## 2023-11-07 PROCEDURE — 82306 VITAMIN D 25 HYDROXY: CPT | Performed by: NURSE PRACTITIONER

## 2023-11-07 PROCEDURE — G0103 PSA SCREENING: HCPCS | Performed by: NURSE PRACTITIONER

## 2023-11-07 PROCEDURE — 84403 ASSAY OF TOTAL TESTOSTERONE: CPT | Performed by: NURSE PRACTITIONER

## 2023-11-07 PROCEDURE — 82607 VITAMIN B-12: CPT | Performed by: NURSE PRACTITIONER

## 2023-11-07 NOTE — PROGRESS NOTES
Venipuncture Blood Specimen Collection  Venipuncture performed in the right arm by Janny Goodman MA with good hemostasis. Patient tolerated the procedure well without complications.   11/07/23   Janny Goodman MA

## 2023-11-07 NOTE — TELEPHONE ENCOUNTER
Patient came into the office today for bloodwork and has an appt sometime next week, but he wanted to let you know that his blood pressure has been running elevated lately. He has been checking it at home because at times he will get headaches and just feel exhausted. He said it was 188/100 and when it is running that elevated he does take the clonidine. Today I checked his BP while he was here and it was 138/88. He is wondering if he needs additional medication. Please advise.

## 2023-11-08 LAB
25(OH)D3 SERPL-MCNC: 37.7 NG/ML (ref 30–100)
ALBUMIN SERPL-MCNC: 4.7 G/DL (ref 3.5–5.2)
ALBUMIN/GLOB SERPL: 2 G/DL
ALP SERPL-CCNC: 60 U/L (ref 39–117)
ALT SERPL W P-5'-P-CCNC: 36 U/L (ref 1–41)
ANION GAP SERPL CALCULATED.3IONS-SCNC: 9.8 MMOL/L (ref 5–15)
AST SERPL-CCNC: 30 U/L (ref 1–40)
BILIRUB SERPL-MCNC: 0.3 MG/DL (ref 0–1.2)
BUN SERPL-MCNC: 20 MG/DL (ref 8–23)
BUN/CREAT SERPL: 15.6 (ref 7–25)
CALCIUM SPEC-SCNC: 9.7 MG/DL (ref 8.6–10.5)
CHLORIDE SERPL-SCNC: 106 MMOL/L (ref 98–107)
CHOLEST SERPL-MCNC: 235 MG/DL (ref 0–200)
CO2 SERPL-SCNC: 25.2 MMOL/L (ref 22–29)
CREAT SERPL-MCNC: 1.28 MG/DL (ref 0.76–1.27)
DEPRECATED RDW RBC AUTO: 39.9 FL (ref 37–54)
EGFRCR SERPLBLD CKD-EPI 2021: 62.1 ML/MIN/1.73
ERYTHROCYTE [DISTWIDTH] IN BLOOD BY AUTOMATED COUNT: 12.3 % (ref 12.3–15.4)
GLOBULIN UR ELPH-MCNC: 2.3 GM/DL
GLUCOSE SERPL-MCNC: 97 MG/DL (ref 65–99)
HCT VFR BLD AUTO: 45 % (ref 37.5–51)
HDLC SERPL-MCNC: 35 MG/DL (ref 40–60)
HGB BLD-MCNC: 15.4 G/DL (ref 13–17.7)
LDLC SERPL CALC-MCNC: 132 MG/DL (ref 0–100)
LDLC/HDLC SERPL: 3.55 {RATIO}
MCH RBC QN AUTO: 30.4 PG (ref 26.6–33)
MCHC RBC AUTO-ENTMCNC: 34.2 G/DL (ref 31.5–35.7)
MCV RBC AUTO: 88.9 FL (ref 79–97)
PLATELET # BLD AUTO: 248 10*3/MM3 (ref 140–450)
PMV BLD AUTO: 10.7 FL (ref 6–12)
POTASSIUM SERPL-SCNC: 4.1 MMOL/L (ref 3.5–5.2)
PROT SERPL-MCNC: 7 G/DL (ref 6–8.5)
PSA SERPL-MCNC: 2.17 NG/ML (ref 0–4)
RBC # BLD AUTO: 5.06 10*6/MM3 (ref 4.14–5.8)
SODIUM SERPL-SCNC: 141 MMOL/L (ref 136–145)
TESTOST SERPL-MCNC: 97.2 NG/DL (ref 193–740)
TRIGL SERPL-MCNC: 378 MG/DL (ref 0–150)
TSH SERPL DL<=0.05 MIU/L-ACNC: 3.13 UIU/ML (ref 0.27–4.2)
VIT B12 BLD-MCNC: 456 PG/ML (ref 211–946)
VLDLC SERPL-MCNC: 68 MG/DL (ref 5–40)
WBC NRBC COR # BLD: 5.39 10*3/MM3 (ref 3.4–10.8)

## 2023-11-13 DIAGNOSIS — I10 PRIMARY HYPERTENSION: ICD-10-CM

## 2023-11-14 ENCOUNTER — OFFICE VISIT (OUTPATIENT)
Dept: FAMILY MEDICINE CLINIC | Facility: CLINIC | Age: 63
End: 2023-11-14
Payer: MEDICARE

## 2023-11-14 VITALS
WEIGHT: 205.2 LBS | SYSTOLIC BLOOD PRESSURE: 164 MMHG | TEMPERATURE: 97.6 F | HEART RATE: 85 BPM | OXYGEN SATURATION: 98 % | BODY MASS INDEX: 27.79 KG/M2 | HEIGHT: 72 IN | RESPIRATION RATE: 18 BRPM | DIASTOLIC BLOOD PRESSURE: 80 MMHG

## 2023-11-14 DIAGNOSIS — N17.9 AKI (ACUTE KIDNEY INJURY): ICD-10-CM

## 2023-11-14 DIAGNOSIS — Z87.891 HISTORY OF TOBACCO USE: ICD-10-CM

## 2023-11-14 DIAGNOSIS — R25.2 LEG CRAMPS: ICD-10-CM

## 2023-11-14 DIAGNOSIS — F43.0 STRESS REACTION: ICD-10-CM

## 2023-11-14 DIAGNOSIS — J30.1 SEASONAL ALLERGIC RHINITIS DUE TO POLLEN: ICD-10-CM

## 2023-11-14 DIAGNOSIS — E78.2 MIXED HYPERLIPIDEMIA: ICD-10-CM

## 2023-11-14 DIAGNOSIS — R07.9 CHEST PAIN, UNSPECIFIED TYPE: ICD-10-CM

## 2023-11-14 DIAGNOSIS — I10 ESSENTIAL HYPERTENSION: ICD-10-CM

## 2023-11-14 DIAGNOSIS — Z00.00 WELCOME TO MEDICARE PREVENTIVE VISIT: Primary | ICD-10-CM

## 2023-11-14 DIAGNOSIS — E29.1 HYPOGONADISM, MALE: ICD-10-CM

## 2023-11-14 RX ORDER — ESCITALOPRAM OXALATE 5 MG/1
5 TABLET ORAL DAILY
Qty: 90 TABLET | Refills: 1 | Status: SHIPPED | OUTPATIENT
Start: 2023-11-14

## 2023-11-14 RX ORDER — LISINOPRIL 40 MG/1
40 TABLET ORAL DAILY
Qty: 90 TABLET | Refills: 1 | Status: SHIPPED | OUTPATIENT
Start: 2023-11-14

## 2023-11-14 RX ORDER — AZELASTINE 1 MG/ML
2 SPRAY, METERED NASAL 2 TIMES DAILY
Qty: 30 ML | Refills: 1 | Status: SHIPPED | OUTPATIENT
Start: 2023-11-14

## 2023-11-14 RX ORDER — CLONIDINE HYDROCHLORIDE 0.1 MG/1
TABLET ORAL
Qty: 90 TABLET | Refills: 1 | Status: SHIPPED | OUTPATIENT
Start: 2023-11-14

## 2023-11-14 RX ORDER — ATORVASTATIN CALCIUM 10 MG/1
10 TABLET, FILM COATED ORAL NIGHTLY
Qty: 90 TABLET | Refills: 1 | Status: SHIPPED | OUTPATIENT
Start: 2023-11-14

## 2023-11-14 RX ORDER — METOPROLOL SUCCINATE 100 MG/1
100 TABLET, EXTENDED RELEASE ORAL DAILY
Qty: 90 TABLET | Refills: 1 | Status: SHIPPED | OUTPATIENT
Start: 2023-11-14

## 2023-11-14 NOTE — PROGRESS NOTES
The ABCs of the Annual Wellness Visit  [place] to Medicare Visit    Chief Complaint   Patient presents with    Welcome To Medicare    Hypertension     Home blood pressure running 150-190 systolic.    Foot pain     Onset x1 yr. Bilateral feet burn in the evening. Intermittent leg cramps    Throat Discomfort     Onset x months. Feel like something is stuck in throat, like a hair.         Subjective     Jaron Espinoza is a 63 y.o. male who presents for a  Welcome to Medicare Visit to follow-up on chronic conditions and review labs.    HTN, elevated lately, he presents a BP log with BPs ranging 160-170 systolic, he is taking Toprol 50 mg daily, lisinopril 40 mg and clonidine as needed for SBP > 160. Reports chest pain with exertion when going up/down stairs and a lot of lifting, denies headache, shortness of air, palpitations and swelling of extremities. Treadmill stress was ordered but has not yt been scheduled.     He is still under stress, reports increased anxiety, he sings and that helps some but he is always overwhelmed and feels uptight. His skin is itchy when he lays down at night.     Hyperlipidemia, the patient drinks minimal water, mostly diet/zero sodas through the day, the patient denies muscle aches, constipation, diarrhea, GI upset, fatigue, chest pain/pressure, exercise intolerance, dyspnea, palpitations, syncope and pedal edema.      Hypogonadism, on testosterone 200 mg q14 days    Vitamin D deficiency, on vitamin D weekly    He has complaints as mentioned in cc of bilateral foot pain when he lies down, they are initially cold, then get hot with burning sensation and intermittent leg cramps, this resolves after a few minutes.     Throat discomfort, feels as if something is stuck in his throat. Nose runs a bit, clear, but had one small amt of blood, ears itch. He denies choking    Here to review labs    PHQ-9 Depression Screening  Little interest or pleasure in doing things? 0-->not at all  "  Feeling down, depressed, or hopeless? 1-->several days   Trouble falling or staying asleep, or sleeping too much?     Feeling tired or having little energy?     Poor appetite or overeating?     Feeling bad about yourself - or that you are a failure or have let yourself or your family down?     Trouble concentrating on things, such as reading the newspaper or watching television?     Moving or speaking so slowly that other people could have noticed? Or the opposite - being so fidgety or restless that you have been moving around a lot more than usual?     Thoughts that you would be better off dead, or of hurting yourself in some way?     PHQ-9 Total Score 1   If you checked off any problems, how difficult have these problems made it for you to do your work, take care of things at home, or get along with other people?           The following portions of the patient's history were reviewed and   updated as appropriate: allergies, current medications, past family history, past medical history, past social history, past surgical history, and problem list.       Compared to one year ago, the patient feels his physical   health is the same.    Compared to one year ago, the patient feels his mental   health is the same.    Recent Hospitalizations:  This patient has had a [facility] admission record on file within the last 365 days.    Current Medical Providers:  Patient Care Team:  Rina Colindres APRN as PCP - General (Nurse Practitioner)    Outpatient Medications Prior to Visit   Medication Sig Dispense Refill    aspirin 81 MG chewable tablet Chew 1 tablet Daily. 30 tablet 0    Syringe, Disposable, 3 ML misc 1 mL Every 14 (Fourteen) Days. 2 each 4    Syringe/Needle, Disp, (B-D INTE[employer]RA SYRINGE) 23G X 1\" 3 ML misc USE TO INJECT TESTOSTERONE ONCE EVERY 14 DAYS 12 each 3    Testosterone Cypionate (DEPOTESTOTERONE CYPIONATE) 200 MG/ML injection INJECT 1 ML INTRAMUSCULARLY EVERY 14 DAYS 6 mL 0    vitamin D " (ERGOCALCIFEROL) 1.25 MG (05244 UT) capsule capsule TAKE ONE CAPSULE BY MOUTH DAILY FOR 3 DAYS, THEN TAKE ONE CAPSULE BY MOUTH ONCE WEEKLY ON MONDAYS 15 capsule 1    atorvastatin (LIPITOR) 10 MG tablet Take 1 tablet by mouth Every Night. 90 tablet 1    cloNIDine (CATAPRES) 0.1 MG tablet TAKE ONE TABLET BY MOUTH EVERY 6 HOURS AS NEEDED FOR HIGH BLOOD PRESSURE (SYSTOLIC OVER 160) 90 tablet 1    lisinopril (PRINIVIL,ZESTRIL) 40 MG tablet Take 1 tablet by mouth Daily. 90 tablet 1    metoprolol succinate XL (Toprol XL) 50 MG 24 hr tablet Take 1 tablet by mouth Daily. 90 tablet 1    hydrocortisone 2.5 % cream Apply 1 application  topically to the appropriate area as directed 2 (Two) Times a Day As Needed. (Patient not taking: Reported on 11/14/2023)      multivitamin with minerals tablet tablet Take 1 tablet by mouth Daily. (Patient not taking: Reported on 11/14/2023)      Testosterone Cypionate 200 MG/ML solution Inject 1 mL as directed Every 14 (Fourteen) Days.       Facility-Administered Medications Prior to Visit   Medication Dose Route Frequency Provider Last Rate Last Admin    Testosterone Cypionate (DEPOTESTOTERONE CYPIONATE) injection 200 mg  200 mg Intramuscular Q14 Days Rina Colindres APRN   200 mg at 08/15/23 1215       No opioid medication identified on active medication list. I have reviewed chart for other potential  high risk medication/s and harmful drug interactions in the elderly.        Aspirin is on active medication list. Aspirin use is indicated based on review of current medical condition/s. Pros and cons of this therapy have been discussed today. Benefits of this medication outweigh potential harm.  Patient has been encouraged to continue taking this medication.  .      Patient Active Problem List   Diagnosis    Abnormal cardiovascular stress test    Acute bronchitis    Allergic conjunctivitis    Chest pain    Chronic anxiety    Degenerative arthritis    Dizziness    Fatigue    General medical  "exam    Hearing loss    History of transient ischemic attack    Hyperlipidemia    Hypertension    Hypogonadism, male    Hypothyroidism    Pain in joint    Low testosterone    Occipital headache    Seborrheic keratosis    Temporal arteritis    Chronic daily headache    Transient ischemic attack (TIA)    Hypertensive encephalopathy     Advance Care Planning   Advance Care Planning     Advance Directive is not on file.  ACP discussion was held with the patient during this visit. Patient does not have an advance directive, information provided.       Objective   Vitals:    11/14/23 1403   BP: 164/80   BP Location: Left arm   Patient Position: Sitting   Cuff Size: Large Adult   Pulse: 85   Resp: 18   Temp: 97.6 °F (36.4 °C)   TempSrc: Oral   SpO2: 98%  Comment: Room air   Weight: 93.1 kg (205 lb 3.2 oz)   Height: 182.9 cm (72\")   PainSc: 0-No pain     Estimated body mass index is 27.83 kg/m² as calculated from the following:    Height as of this encounter: 182.9 cm (72\").    Weight as of this encounter: 93.1 kg (205 lb 3.2 oz).    Vision Screening    Right eye Left eye Both eyes   Without correction 20/30 20/20 20/25   With correction           Physical Exam  Constitutional:       General: He is not in acute distress.     Appearance: Normal appearance. He is well-developed. He is not ill-appearing or diaphoretic.   HENT:      Head: Normocephalic.      Right Ear: Tympanic membrane and ear canal normal.      Left Ear: Tympanic membrane and ear canal normal.      Nose: Rhinorrhea present. No congestion.      Mouth/Throat:      Pharynx: Posterior oropharyngeal erythema (posterior OP cobblestoning) present.   Eyes:      Conjunctiva/sclera: Conjunctivae normal.      Pupils: Pupils are equal, round, and reactive to light.   Neck:      Thyroid: No thyromegaly.      Vascular: No JVD.   Cardiovascular:      Rate and Rhythm: Normal rate and regular rhythm.      Heart sounds: Normal heart sounds. No murmur heard.  Pulmonary:      " Effort: Pulmonary effort is normal. No respiratory distress.      Breath sounds: Normal breath sounds. No wheezing or rhonchi.   Abdominal:      General: Bowel sounds are normal. There is no distension.      Palpations: Abdomen is soft.      Tenderness: There is no abdominal tenderness.   Musculoskeletal:         General: No swelling or tenderness. Normal range of motion.      Cervical back: Normal range of motion and neck supple. No tenderness.   Lymphadenopathy:      Cervical: No cervical adenopathy.   Skin:     General: Skin is warm and dry.      Coloration: Skin is not jaundiced.      Findings: No erythema or rash.   Neurological:      General: No focal deficit present.      Mental Status: He is alert and oriented to person, place, and time. Mental status is at baseline.      Sensory: No sensory deficit.      Motor: No weakness.      Gait: Gait normal.   Psychiatric:         Attention and Perception: Attention normal.         Mood and Affect: Mood normal. Mood is anxious. Mood is not depressed.         Speech: Speech normal.         Behavior: Behavior normal. Behavior is cooperative.         Thought Content: Thought content normal.         Judgment: Judgment normal.             Does the patient have evidence of cognitive impairment?   No    Lab Results   Component Value Date    TRIG 378 (H) 2023    HDL 35 (L) 2023     (H) 2023    VLDL 68 (H) 2023       Procedures       HEALTH RISK ASSESSMENT    Smoking Status:  Social History     Tobacco Use   Smoking Status Former    Packs/day: 0.50    Years: 15.00    Additional pack years: 0.00    Total pack years: 7.50    Types: Cigarettes    Start date:     Quit date:     Years since quittin.8    Passive exposure: Past   Smokeless Tobacco Never   Tobacco Comments    quit x30 years     Alcohol Consumption:  Social History     Substance and Sexual Activity   Alcohol Use No       Fall Risk Screen:    STEADI Fall Risk Assessment was  completed, and patient is at LOW risk for falls.Assessment completed on:2023    Depression Screen:       2023     1:00 PM   PHQ-2/PHQ-9 Depression Screening   Little Interest or Pleasure in Doing Things 0-->not at all   Feeling Down, Depressed or Hopeless 1-->several days   PHQ-9: Brief Depression Severity Measure Score 1       Health Habits and Functional and Cognitive Screenin/14/2023     1:00 PM   Functional & Cognitive Status   Do you have difficulty preparing food and eating? No   Do you have difficulty bathing yourself, getting dressed or grooming yourself? No   Do you have difficulty using the toilet? No   Do you have difficulty moving around from place to place? No   Do you have trouble with steps or getting out of a bed or a chair? No   Current Diet Well Balanced Diet   Dental Exam Up to date   Eye Exam Not up to date   Exercise (times per week) 0 times per week   Current Exercises Include No Regular Exercise   Do you need help using the phone?  No   Are you deaf or do you have serious difficulty hearing?  No   Do you need help to go to places out of walking distance? No   Do you need help shopping? No   Do you need help preparing meals?  No   Do you need help with housework?  No   Do you need help with laundry? No   Do you need help taking your medications? No   Do you need help managing money? No   Do you ever drive or ride in a car without wearing a seat belt? No   Have you felt unusual stress, anger or loneliness in the last month? No   Who do you live with? Alone   If you need help, do you have trouble finding someone available to you? No   Have you been bothered in the last four weeks by sexual problems? No   Do you have difficulty concentrating, remembering or making decisions? No       Visual Acuity:    Vision Screening    Right eye Left eye Both eyes   Without correction 20/30 20/20 20/25   With correction        ATTENTION  What is the year: correct  What is the month of the  year: correct  What is the day of the week?: correct  What is the date?: correct  MEMORY  Repeat address three times, only score third attempt: [address] [place], [place]: 7  HOW MANY ANIMALS DID THE PATIENT NAME  Verbal Fluency -- Animal Names (0-25): 22+  CLOCK DRAWING  Clock Drawing: All Correct  MEMORY RECALL  Tell me what you remember about that name and address we were repeating at the beginnin  ACE TOTAL SCORE  Total ACE Score - <25/30 strongly suggests cognitive impairment; <21/30 almost certainly shows dementia: 27    Age-appropriate Screening Schedule:  Refer to the list below for future screening recommendations based on patient's age, sex and/or medical conditions. Orders for these recommended tests are listed in the plan section. The patient has been provided with a written plan.    Health Maintenance   Topic Date Due    ZOSTER VACCINE (1 of 2) Never done    ANNUAL PHYSICAL  Never done    Pneumococcal Vaccine 65+ (1 - PCV) Never done    AAA SCREEN (ONE-TIME)  Never done    BMI FOLLOWUP  05/10/2024    LIPID PANEL  2024    COLORECTAL CANCER SCREENING  2026    TDAP/TD VACCINES (2 - Td or Tdap) 2033    HEPATITIS C SCREENING  Completed    COVID-19 Vaccine  Discontinued    INFLUENZA VACCINE  Discontinued        CMS Preventative Services Quick Reference  Risk Factors Identified During Encounter    Immunizations Discussed/Encouraged: Prevnar 20 (Pneumococcal 20-valent conjugate) and Shingrix  The above risks/problems have been discussed with the patient.  Pertinent information has been shared with the patient in the After Visit Summary.  Follow up plans and orders are seen below in the Assessment/Plan Section.    Diagnoses and all orders for this visit:    1. Welcome to Medicare preventive visit (Primary)  Comments:  labs reviewed  pt left before EKG could be obtained today  Orders:  -     Cancel: ECG 12 Lead    2. History of tobacco use  -     [place] aaa screen limited;  Future    3. Essential hypertension  Comments:  bp log reviewed, still with elevation  increase metop and continue lisinopril  cont clonidine prn for sbp >160  Goal less than 140/90  Orders:  -     metoprolol succinate XL (Toprol XL) 100 MG 24 hr tablet; Take 1 tablet by mouth Daily.  Dispense: 90 tablet; Refill: 1  -     lisinopril (PRINIVIL,ZESTRIL) 40 MG tablet; Take 1 tablet by mouth Daily.  Dispense: 90 tablet; Refill: 1    4. Stress reaction  Comments:  pt has discussed anxiety/stress mutliple times  rec try lexapro low dose  Orders:  -     escitalopram (Lexapro) 5 MG tablet; Take 1 tablet by mouth Daily.  Dispense: 90 tablet; Refill: 1    5. Mixed hyperlipidemia  Comments:  lipids worse, not fasting for labs.   rec HHD, exercise and work on weight loss  cont statin  Orders:  -     atorvastatin (LIPITOR) 10 MG tablet; Take 1 tablet by mouth Every Night.  Dispense: 90 tablet; Refill: 1    6. Hypogonadism, male  Comments:  T is low, he was at 2 week michaela and due for injection  continue 200mg Q2 weeks    7. Seasonal allergic rhinitis due to pollen  Comments:  throat irritation 2/2 post nasal drip, rec trial of astelin  Orders:  -     azelastine (ASTELIN) 0.1 % nasal spray; 2 sprays into the nostril(s) as directed by provider 2 (Two) Times a Day.  Dispense: 30 mL; Refill: 1    8. TALON (acute kidney injury)  Comments:  increase water intake to 2l/day, limit soda to 2/day    9. Chest pain, unspecified type  Comments:  Symptoms likely secondary to elevated BP and anxiety  patient provided telephone number to call and schedule treadmill stress test      -Patient declines flu shot  -Cologuard due 2026  -Recommend Shingrix and pneumo 20 at pharmacy  -I've explained to him that drugs of the SSRI class can have side effects such as weight gain, sexual dysfunction, insomnia, headache, nausea. These medications are generally effective at alleviating symptoms of anxiety and/or depression.  He will let me know if significant  side effects do occur.      Follow Up:     Return in about 6 months (around 5/14/2024) for Recheck. HTN panel and total T prior to appt.    Initial Medicare Visit in one year    An After Visit Summary and PPPS were made available to the patient.      EMR Dragon transcription disclaimer:  Part of this note may be an electronic transcription/translation of spoken language to printed text using the Dragon Dictation System.

## 2023-11-22 ENCOUNTER — TELEPHONE (OUTPATIENT)
Dept: FAMILY MEDICINE CLINIC | Facility: CLINIC | Age: 63
End: 2023-11-22
Payer: MEDICARE

## 2023-11-22 RX ORDER — HYDRALAZINE HYDROCHLORIDE 25 MG/1
25 TABLET, FILM COATED ORAL 3 TIMES DAILY
Qty: 90 TABLET | Refills: 0 | Status: SHIPPED | OUTPATIENT
Start: 2023-11-22

## 2023-11-22 NOTE — TELEPHONE ENCOUNTER
Jaron came into office stating that his blood pressure has remained elevated despite increased dosage. He is currently taking Metoprolol 100mg, lisinopril 40mg and clonodine 0.1 for systolic >160. Brought in home blood pressure readings    206/98 60  174/98  59  179/102  57  198/98  58  197/109  57  170/99  60  165/87  56  190/98  59  190/92  72    He also stated that he was unable to tolerate atorvastatin. Stated that he had extreme fatigue and unable to function while taking medication. Took for two days and then stopped.

## 2023-11-29 RX ORDER — ERGOCALCIFEROL 1.25 MG/1
CAPSULE ORAL
Qty: 15 CAPSULE | Refills: 1 | Status: SHIPPED | OUTPATIENT
Start: 2023-11-29

## 2023-12-18 RX ORDER — HYDRALAZINE HYDROCHLORIDE 25 MG/1
25 TABLET, FILM COATED ORAL 3 TIMES DAILY
Qty: 90 TABLET | Refills: 0 | Status: SHIPPED | OUTPATIENT
Start: 2023-12-18

## 2023-12-22 ENCOUNTER — HOSPITAL ENCOUNTER (OUTPATIENT)
Dept: RESPIRATORY THERAPY | Facility: HOSPITAL | Age: 63
Discharge: HOME | End: 2023-12-22
Payer: MEDICARE

## 2023-12-22 DIAGNOSIS — I10 ESSENTIAL HYPERTENSION: ICD-10-CM

## 2023-12-22 DIAGNOSIS — R07.9 CHEST PAIN, UNSPECIFIED TYPE: ICD-10-CM

## 2023-12-27 DIAGNOSIS — I10 PRIMARY HYPERTENSION: ICD-10-CM

## 2023-12-29 ENCOUNTER — HOSPITAL ENCOUNTER (OUTPATIENT)
Dept: ULTRASOUND IMAGING | Facility: HOSPITAL | Age: 63
Discharge: HOME | End: 2023-12-29
Admitting: NURSE PRACTITIONER
Payer: MEDICARE

## 2023-12-29 DIAGNOSIS — Z87.891 HISTORY OF TOBACCO USE: ICD-10-CM

## 2023-12-29 DIAGNOSIS — I10 PRIMARY HYPERTENSION: ICD-10-CM

## 2023-12-29 PROCEDURE — 76706 US ABDL AORTA SCREEN AAA: CPT

## 2023-12-29 RX ORDER — CLONIDINE HYDROCHLORIDE 0.1 MG/1
0.1 TABLET ORAL EVERY 6 HOURS PRN
Qty: 90 TABLET | Refills: 1 | Status: SHIPPED | OUTPATIENT
Start: 2023-12-29

## 2023-12-29 RX ORDER — CLONIDINE HYDROCHLORIDE 0.1 MG/1
TABLET ORAL
Qty: 90 TABLET | Refills: 1 | OUTPATIENT
Start: 2023-12-29

## 2023-12-30 DIAGNOSIS — I10 ESSENTIAL HYPERTENSION: ICD-10-CM

## 2024-01-02 DIAGNOSIS — E29.1 HYPOGONADISM IN MALE: ICD-10-CM

## 2024-01-02 DIAGNOSIS — J30.1 SEASONAL ALLERGIC RHINITIS DUE TO POLLEN: ICD-10-CM

## 2024-01-02 RX ORDER — TESTOSTERONE CYPIONATE 200 MG/ML
200 INJECTION, SOLUTION INTRAMUSCULAR
Qty: 6 ML | Refills: 0 | Status: SHIPPED | OUTPATIENT
Start: 2024-01-02

## 2024-01-02 RX ORDER — METOPROLOL SUCCINATE 50 MG/1
50 TABLET, EXTENDED RELEASE ORAL DAILY
Qty: 90 TABLET | Refills: 1 | OUTPATIENT
Start: 2024-01-02

## 2024-01-02 RX ORDER — AZELASTINE HYDROCHLORIDE 137 UG/1
2 SPRAY, METERED NASAL 2 TIMES DAILY
Qty: 30 ML | Refills: 1 | Status: SHIPPED | OUTPATIENT
Start: 2024-01-02

## 2024-01-16 RX ORDER — HYDRALAZINE HYDROCHLORIDE 25 MG/1
25 TABLET, FILM COATED ORAL 3 TIMES DAILY
Qty: 90 TABLET | Refills: 0 | Status: SHIPPED | OUTPATIENT
Start: 2024-01-16

## 2024-02-09 DIAGNOSIS — I10 PRIMARY HYPERTENSION: ICD-10-CM

## 2024-02-09 RX ORDER — CLONIDINE HYDROCHLORIDE 0.1 MG/1
TABLET ORAL
Qty: 90 TABLET | Refills: 1 | Status: SHIPPED | OUTPATIENT
Start: 2024-02-09

## 2024-02-13 RX ORDER — HYDRALAZINE HYDROCHLORIDE 25 MG/1
25 TABLET, FILM COATED ORAL 3 TIMES DAILY
Qty: 90 TABLET | Refills: 3 | Status: SHIPPED | OUTPATIENT
Start: 2024-02-13

## 2024-02-28 ENCOUNTER — TELEPHONE (OUTPATIENT)
Dept: FAMILY MEDICINE CLINIC | Facility: CLINIC | Age: 64
End: 2024-02-28
Payer: MEDICARE

## 2024-02-28 NOTE — TELEPHONE ENCOUNTER
Key: [id number]    Outcome: Available without authorization.   The member is able to fill the requested drug at the pharmacy.

## 2024-03-26 DIAGNOSIS — I10 PRIMARY HYPERTENSION: ICD-10-CM

## 2024-03-27 RX ORDER — CLONIDINE HYDROCHLORIDE 0.1 MG/1
TABLET ORAL
Qty: 90 TABLET | Refills: 1 | Status: SHIPPED | OUTPATIENT
Start: 2024-03-27

## 2024-04-12 DIAGNOSIS — I10 ESSENTIAL HYPERTENSION: ICD-10-CM

## 2024-04-12 RX ORDER — LISINOPRIL 40 MG/1
40 TABLET ORAL DAILY
Qty: 90 TABLET | Refills: 1 | Status: SHIPPED | OUTPATIENT
Start: 2024-04-12

## 2024-04-12 NOTE — TELEPHONE ENCOUNTER
Caller: Alexis Jaron Hassan    Relationship: Self    Best call back number: [phone]     Requested Prescriptions:   Requested Prescriptions     Pending Prescriptions Disp Refills    lisinopril (PRINIVIL,ZESTRIL) 40 MG tablet 90 tablet 1     Sig: Take 1 tablet by mouth Daily.        Pharmacy where request should be sent: [id number]  [place], [place]  [address] - [phone]  - [phone] FX     Last office visit with prescribing clinician: 11/14/2023   Last telemedicine visit with prescribing clinician: Visit date not found   Next office visit with prescribing clinician: 5/14/2024     Additional details provided by patient: PATIENT IS OUT OF THIS MEDICATION.    Does the patient have less than a 3 day supply:  [x] Yes  [] No    Would you like a call back once the refill request has been completed: [] Yes [x] No    Tessy Gonzalez Rep   04/12/24 12:09 EDT

## 2024-05-01 DIAGNOSIS — E29.1 HYPOGONADISM IN MALE: ICD-10-CM

## 2024-05-01 DIAGNOSIS — I10 ESSENTIAL HYPERTENSION: Primary | ICD-10-CM

## 2024-05-07 ENCOUNTER — LAB (OUTPATIENT)
Dept: FAMILY MEDICINE CLINIC | Facility: CLINIC | Age: 64
End: 2024-05-07
Payer: MEDICARE

## 2024-05-07 PROCEDURE — 80061 LIPID PANEL: CPT | Performed by: NURSE PRACTITIONER

## 2024-05-07 PROCEDURE — 80053 COMPREHEN METABOLIC PANEL: CPT | Performed by: NURSE PRACTITIONER

## 2024-05-07 PROCEDURE — 85027 COMPLETE CBC AUTOMATED: CPT | Performed by: NURSE PRACTITIONER

## 2024-05-07 PROCEDURE — 36415 COLL VENOUS BLD VENIPUNCTURE: CPT | Performed by: NURSE PRACTITIONER

## 2024-05-07 PROCEDURE — 84443 ASSAY THYROID STIM HORMONE: CPT | Performed by: NURSE PRACTITIONER

## 2024-05-07 PROCEDURE — 84403 ASSAY OF TOTAL TESTOSTERONE: CPT | Performed by: NURSE PRACTITIONER

## 2024-05-08 LAB
ALBUMIN SERPL-MCNC: 4.7 G/DL (ref 3.5–5.2)
ALBUMIN/GLOB SERPL: 2 G/DL
ALP SERPL-CCNC: 72 U/L (ref 39–117)
ALT SERPL W P-5'-P-CCNC: 25 U/L (ref 1–41)
ANION GAP SERPL CALCULATED.3IONS-SCNC: 14 MMOL/L (ref 5–15)
AST SERPL-CCNC: 17 U/L (ref 1–40)
BILIRUB SERPL-MCNC: 0.5 MG/DL (ref 0–1.2)
BUN SERPL-MCNC: 25 MG/DL (ref 8–23)
BUN/CREAT SERPL: 23.4 (ref 7–25)
CALCIUM SPEC-SCNC: 9.6 MG/DL (ref 8.6–10.5)
CHLORIDE SERPL-SCNC: 103 MMOL/L (ref 98–107)
CHOLEST SERPL-MCNC: 199 MG/DL (ref 0–200)
CO2 SERPL-SCNC: 24 MMOL/L (ref 22–29)
CREAT SERPL-MCNC: 1.07 MG/DL (ref 0.76–1.27)
DEPRECATED RDW RBC AUTO: 44.4 FL (ref 37–54)
EGFRCR SERPLBLD CKD-EPI 2021: 77 ML/MIN/1.73
ERYTHROCYTE [DISTWIDTH] IN BLOOD BY AUTOMATED COUNT: 13.1 % (ref 12.3–15.4)
GLOBULIN UR ELPH-MCNC: 2.3 GM/DL
GLUCOSE SERPL-MCNC: 126 MG/DL (ref 65–99)
HCT VFR BLD AUTO: 49.4 % (ref 37.5–51)
HDLC SERPL-MCNC: 53 MG/DL (ref 40–60)
HGB BLD-MCNC: 15.8 G/DL (ref 13–17.7)
LDLC SERPL CALC-MCNC: 100 MG/DL (ref 0–100)
LDLC/HDLC SERPL: 1.73 {RATIO}
MCH RBC QN AUTO: 29.7 PG (ref 26.6–33)
MCHC RBC AUTO-ENTMCNC: 32 G/DL (ref 31.5–35.7)
MCV RBC AUTO: 92.9 FL (ref 79–97)
PLATELET # BLD AUTO: 337 10*3/MM3 (ref 140–450)
PMV BLD AUTO: 9.9 FL (ref 6–12)
POTASSIUM SERPL-SCNC: 4.6 MMOL/L (ref 3.5–5.2)
PROT SERPL-MCNC: 7 G/DL (ref 6–8.5)
RBC # BLD AUTO: 5.32 10*6/MM3 (ref 4.14–5.8)
SODIUM SERPL-SCNC: 141 MMOL/L (ref 136–145)
TESTOST SERPL-MCNC: 64.5 NG/DL (ref 193–740)
TRIGL SERPL-MCNC: 272 MG/DL (ref 0–150)
TSH SERPL DL<=0.05 MIU/L-ACNC: 0.84 UIU/ML (ref 0.27–4.2)
VLDLC SERPL-MCNC: 46 MG/DL (ref 5–40)
WBC NRBC COR # BLD AUTO: 11.75 10*3/MM3 (ref 3.4–10.8)

## 2024-05-09 DIAGNOSIS — I10 ESSENTIAL HYPERTENSION: ICD-10-CM

## 2024-05-09 DIAGNOSIS — F43.0 STRESS REACTION: ICD-10-CM

## 2024-05-09 RX ORDER — ESCITALOPRAM OXALATE 5 MG/1
5 TABLET ORAL DAILY
Qty: 90 TABLET | Refills: 1 | Status: SHIPPED | OUTPATIENT
Start: 2024-05-09

## 2024-05-09 RX ORDER — METOPROLOL SUCCINATE 100 MG/1
100 TABLET, EXTENDED RELEASE ORAL DAILY
Qty: 90 TABLET | Refills: 1 | Status: SHIPPED | OUTPATIENT
Start: 2024-05-09

## 2024-05-10 DIAGNOSIS — E29.1 HYPOGONADISM IN MALE: ICD-10-CM

## 2024-05-11 RX ORDER — NEEDLES, FILTER 19GX1 1/2"
NEEDLE, DISPOSABLE MISCELLANEOUS
Qty: 2 EACH | Refills: 11 | Status: SHIPPED | OUTPATIENT
Start: 2024-05-11

## 2024-05-14 ENCOUNTER — OFFICE VISIT (OUTPATIENT)
Dept: FAMILY MEDICINE CLINIC | Facility: CLINIC | Age: 64
End: 2024-05-14
Payer: MEDICARE

## 2024-05-14 VITALS
DIASTOLIC BLOOD PRESSURE: 83 MMHG | OXYGEN SATURATION: 97 % | HEIGHT: 72 IN | WEIGHT: 195.2 LBS | BODY MASS INDEX: 26.44 KG/M2 | TEMPERATURE: 98.5 F | SYSTOLIC BLOOD PRESSURE: 150 MMHG | HEART RATE: 64 BPM

## 2024-05-14 DIAGNOSIS — E55.9 VITAMIN D DEFICIENCY: ICD-10-CM

## 2024-05-14 DIAGNOSIS — R25.2 LEG CRAMPING: ICD-10-CM

## 2024-05-14 DIAGNOSIS — I10 PRIMARY HYPERTENSION: ICD-10-CM

## 2024-05-14 DIAGNOSIS — E78.2 MIXED HYPERLIPIDEMIA: ICD-10-CM

## 2024-05-14 DIAGNOSIS — E29.1 HYPOGONADISM IN MALE: ICD-10-CM

## 2024-05-14 DIAGNOSIS — F43.0 STRESS REACTION: ICD-10-CM

## 2024-05-14 DIAGNOSIS — G47.09 OTHER INSOMNIA: ICD-10-CM

## 2024-05-14 DIAGNOSIS — J30.1 SEASONAL ALLERGIC RHINITIS DUE TO POLLEN: ICD-10-CM

## 2024-05-14 DIAGNOSIS — L30.9 DERMATITIS: Primary | ICD-10-CM

## 2024-05-14 PROCEDURE — 3077F SYST BP >= 140 MM HG: CPT | Performed by: NURSE PRACTITIONER

## 2024-05-14 PROCEDURE — 1159F MED LIST DOCD IN RCRD: CPT | Performed by: NURSE PRACTITIONER

## 2024-05-14 PROCEDURE — 1126F AMNT PAIN NOTED NONE PRSNT: CPT | Performed by: NURSE PRACTITIONER

## 2024-05-14 PROCEDURE — 99215 OFFICE O/P EST HI 40 MIN: CPT | Performed by: NURSE PRACTITIONER

## 2024-05-14 PROCEDURE — 1160F RVW MEDS BY RX/DR IN RCRD: CPT | Performed by: NURSE PRACTITIONER

## 2024-05-14 PROCEDURE — 3079F DIAST BP 80-89 MM HG: CPT | Performed by: NURSE PRACTITIONER

## 2024-05-14 RX ORDER — TESTOSTERONE CYPIONATE 200 MG/ML
200 INJECTION, SOLUTION INTRAMUSCULAR
Qty: 6 ML | Refills: 1 | Status: SHIPPED | OUTPATIENT
Start: 2024-05-14

## 2024-05-14 RX ORDER — HYDROXYZINE HYDROCHLORIDE 10 MG/1
10-20 TABLET, FILM COATED ORAL EVERY 6 HOURS PRN
Qty: 60 TABLET | Refills: 0 | Status: SHIPPED | OUTPATIENT
Start: 2024-05-14

## 2024-05-14 RX ORDER — ATORVASTATIN CALCIUM 10 MG/1
10 TABLET, FILM COATED ORAL NIGHTLY
Qty: 90 TABLET | Refills: 1 | Status: SHIPPED | OUTPATIENT
Start: 2024-05-14

## 2024-05-14 RX ORDER — PERMETHRIN 50 MG/G
1 CREAM TOPICAL ONCE
Qty: 60 G | Refills: 1 | Status: SHIPPED | OUTPATIENT
Start: 2024-05-14 | End: 2024-05-14

## 2024-05-14 RX ORDER — ERGOCALCIFEROL 1.25 MG/1
50000 CAPSULE ORAL
Qty: 15 CAPSULE | Refills: 1 | Status: SHIPPED | OUTPATIENT
Start: 2024-05-14

## 2024-05-14 NOTE — PROGRESS NOTES
"Chief Complaint  Chief Complaint   Patient presents with    Follow-up     6 month f/u           Subjective          Jaron Espinoza presents to [facility] for   History of Present Illness    He has complaints of dermatitis, he got a new soap online from [place], used it only one time and developed a rash, he believes \"it has bugs in it\" he has went to urgent care twice for this, prescribed prednisone and triamcinolone w/o improvement. He has bite like lesions scattered all over that develop \"lines\" of redness, c/o itching that keeps him up at night.     HTN, with recent adjustments to BP medications, BP is still slightly elevated today, he reports BP at home running 130-170, HR 60-90, he stopped metoprolol and hydralazine, only taking lisinopril 40 mg and clonidine as needed for SBP > 160. Reports chest pain with exertion has resolved since starting lexapro. Denies headache, shortness of air, palpitations and swelling of extremities. Treadmill stress was ordered but has not yet been scheduled.      Anxiety/stress, patient was started on low-dose Lexapro, he is still under stress, he sings and that helps some but he is always overwhelmed and feels uptight but s/s have improved.       Hyperlipidemia, on statin, c/o BLE cramping at night. The patient drinks minimal water, mostly diet/zero sodas through the day, the patient denies muscle aches, constipation, diarrhea, GI upset, fatigue, chest pain/pressure, exercise intolerance, dyspnea, palpitations, syncope and pedal edema.       Hypogonadism, on testosterone 200 mg q14 days, labs were drawn the same day of IM injection just prior to shot.      Vitamin D deficiency, on vitamin D weekly         The following portions of the patient's history were reviewed and updated as appropriate: allergies, current medications, past family history, past medical history, past social history, past surgical history and problem list.    Past Medical " "History:   Diagnosis Date    Headache     Hx of hypogonadism     Hyperlipidemia     Hypertension      Past Surgical History:   Procedure Laterality Date    EYELID RETRACTION REPAIR       Family History   Problem Relation Age of Onset    Colon cancer Mother      Social History     Tobacco Use    Smoking status: Former     Current packs/day: 0.00     Average packs/day: 0.5 packs/day for 15.0 years (7.5 ttl pk-yrs)     Types: Cigarettes     Start date:      Quit date:      Years since quittin.3     Passive exposure: Past    Smokeless tobacco: Never    Tobacco comments:     quit x30 years   Substance Use Topics    Alcohol use: No       Current Outpatient Medications:     aspirin 81 MG chewable tablet, Chew 1 tablet Daily., Disp: 30 tablet, Rfl: 0    atorvastatin (LIPITOR) 10 MG tablet, Take 1 tablet by mouth Every Night., Disp: 90 tablet, Rfl: 1    Azelastine HCl 137 MCG/SPRAY solution, SPRAY TWO SPRAYS IN EACH NOSTRIL TWICE DAILY, Disp: 30 mL, Rfl: 1    cloNIDine (CATAPRES) 0.1 MG tablet, TAKE 1 TABLET BY MOUTH EVERY 6 HOURS AS NEEDED FOR HIGH BLOOD PRESSURE (SYSTOLIC OVER 160), Disp: 90 tablet, Rfl: 1    escitalopram (LEXAPRO) 5 MG tablet, TAKE 1 TABLET BY MOUTH DAILY, Disp: 90 tablet, Rfl: 1    hydrALAZINE (APRESOLINE) 25 MG tablet, TAKE ONE TABLET BY MOUTH THREE TIMES A DAY, Disp: 90 tablet, Rfl: 3    hydrocortisone 2.5 % cream, Apply 1 Application topically to the appropriate area as directed 2 (Two) Times a Day As Needed., Disp: , Rfl:     lisinopril (PRINIVIL,ZESTRIL) 40 MG tablet, Take 1 tablet by mouth Daily., Disp: 90 tablet, Rfl: 1    metoprolol succinate XL (TOPROL-XL) 100 MG 24 hr tablet, TAKE 1 TABLET BY MOUTH DAILY, Disp: 90 tablet, Rfl: 1    Syringe, Disposable, 3 ML misc, Use 1 mL Every 14 (Fourteen) Days., Disp: 2 each, Rfl: 4    Syringe/Needle, Disp, (B-D INTEGRA SYRINGE) 23G X 1\" 3 ML misc, USE TO INJECT TESTOSTERONE ONCE EVERY 14 DAYS, Disp: 2 each, Rfl: 11    Testosterone Cypionate " "(DEPOTESTOTERONE CYPIONATE) 200 MG/ML injection, Inject 1 mL into the appropriate muscle as directed by prescriber Every 14 (Fourteen) Days., Disp: 6 mL, Rfl: 1    vitamin D (ERGOCALCIFEROL) 1.25 MG (84378 UT) capsule capsule, Take 1 capsule by mouth Every 7 (Seven) Days., Disp: 15 capsule, Rfl: 1    hydrOXYzine (ATARAX) 10 MG tablet, Take 1-2 tablets by mouth Every 6 (Six) Hours As Needed for Itching (insomnia)., Disp: 60 tablet, Rfl: 0    permethrin (ELIMITE) 5 % cream, Apply 1 Application topically to the appropriate area as directed 1 (One) Time for 1 dose. Apply from head to toe, leave on 12 hours and wash off. May repeat in 5-7 days if needed, Disp: 60 g, Rfl: 1    Current Facility-Administered Medications:     Testosterone Cypionate (DEPOTESTOTERONE CYPIONATE) injection 200 mg, 200 mg, Intramuscular, Q14 Days, JensRina, APRN, 200 mg at 08/15/23 1215    Objective   Vital Signs:   /83 (BP Location: Left arm, Patient Position: Sitting, Cuff Size: Adult)   Pulse 64   Temp 98.5 °F (36.9 °C) (Temporal)   Ht 182.9 cm (72\")   Wt 88.5 kg (195 lb 3.2 oz)   SpO2 97%   BMI 26.47 kg/m²           Physical Exam  Constitutional:       General: He is not in acute distress.     Appearance: Normal appearance. He is well-developed. He is not ill-appearing or diaphoretic.   HENT:      Head: Normocephalic.   Eyes:      Conjunctiva/sclera: Conjunctivae normal.      Pupils: Pupils are equal, round, and reactive to light.   Neck:      Thyroid: No thyromegaly.      Vascular: No JVD.   Cardiovascular:      Rate and Rhythm: Normal rate and regular rhythm.      Heart sounds: Normal heart sounds. No murmur heard.  Pulmonary:      Effort: Pulmonary effort is normal. No respiratory distress.      Breath sounds: Normal breath sounds. No wheezing or rhonchi.   Abdominal:      General: Bowel sounds are normal. There is no distension.      Palpations: Abdomen is soft.      Tenderness: There is no abdominal tenderness. "   Musculoskeletal:         General: No swelling or tenderness. Normal range of motion.      Cervical back: Normal range of motion and neck supple. No tenderness.   Lymphadenopathy:      Cervical: No cervical adenopathy.   Skin:     General: Skin is warm and dry.      Coloration: Skin is not jaundiced.      Findings: Rash (scattered bites with mite like appearance, erythema, pruritis) present. No erythema.   Neurological:      General: No focal deficit present.      Mental Status: He is alert and oriented to person, place, and time. Mental status is at baseline.      Sensory: No sensory deficit.   Psychiatric:         Mood and Affect: Mood normal.         Behavior: Behavior normal.         Thought Content: Thought content normal.         Judgment: Judgment normal.          Result Review :     No visits with results within 7 Day(s) from this visit.   Latest known visit with results is:   Orders Only on 05/01/2024   Component Date Value Ref Range Status    WBC 05/07/2024 11.75 (H)  3.40 - 10.80 10*3/mm3 Final    RBC 05/07/2024 5.32  4.14 - 5.80 10*6/mm3 Final    Hemoglobin 05/07/2024 15.8  13.0 - 17.7 g/dL Final    Hematocrit 05/07/2024 49.4  37.5 - 51.0 % Final    MCV 05/07/2024 92.9  79.0 - 97.0 fL Final    MCH 05/07/2024 29.7  26.6 - 33.0 pg Final    MCHC 05/07/2024 32.0  31.5 - 35.7 g/dL Final    RDW 05/07/2024 13.1  12.3 - 15.4 % Final    RDW-SD 05/07/2024 44.4  37.0 - 54.0 fl Final    MPV 05/07/2024 9.9  6.0 - 12.0 fL Final    Platelets 05/07/2024 337  140 - 450 10*3/mm3 Final    Glucose 05/07/2024 126 (H)  65 - 99 mg/dL Final    BUN 05/07/2024 25 (H)  8 - 23 mg/dL Final    Creatinine 05/07/2024 1.07  0.76 - 1.27 mg/dL Final    Sodium 05/07/2024 141  136 - 145 mmol/L Final    Potassium 05/07/2024 4.6  3.5 - 5.2 mmol/L Final    Chloride 05/07/2024 103  98 - 107 mmol/L Final    CO2 05/07/2024 24.0  22.0 - 29.0 mmol/L Final    Calcium 05/07/2024 9.6  8.6 - 10.5 mg/dL Final    Total Protein 05/07/2024 7.0  6.0 - 8.5  g/dL Final    Albumin 05/07/2024 4.7  3.5 - 5.2 g/dL Final    ALT (SGPT) 05/07/2024 25  1 - 41 U/L Final    AST (SGOT) 05/07/2024 17  1 - 40 U/L Final    Alkaline Phosphatase 05/07/2024 72  39 - 117 U/L Final    Total Bilirubin 05/07/2024 0.5  0.0 - 1.2 mg/dL Final    Globulin 05/07/2024 2.3  gm/dL Final    A/G Ratio 05/07/2024 2.0  g/dL Final    BUN/Creatinine Ratio 05/07/2024 23.4  7.0 - 25.0 Final    Anion Gap 05/07/2024 14.0  5.0 - 15.0 mmol/L Final    eGFR 05/07/2024 77.0  >60.0 mL/min/1.73 Final    Total Cholesterol 05/07/2024 199  0 - 200 mg/dL Final    Triglycerides 05/07/2024 272 (H)  0 - 150 mg/dL Final    HDL Cholesterol 05/07/2024 53  40 - 60 mg/dL Final    LDL Cholesterol  05/07/2024 100  0 - 100 mg/dL Final    VLDL Cholesterol 05/07/2024 46 (H)  5 - 40 mg/dL Final    LDL/HDL Ratio 05/07/2024 1.73   Final    TSH 05/07/2024 0.842  0.270 - 4.200 uIU/mL Final    Testosterone, Total 05/07/2024 64.50 (L)  193.00 - 740.00 ng/dL Final                  BMI is >= 25 and <30. (Overweight) The following options were offered after discussion;: exercise counseling/recommendations and nutrition counseling/recommendations           Assessment and Plan    Diagnoses and all orders for this visit:    1. Dermatitis (Primary)  Comments:  tried and failed multiple rounds of prednisone, appears to be scattered bites vs mites.   try permethrin cream   cont triamcinolone  try hydroxyzine for itching    2. Leg cramping  Comments:  rec try magnesium nightly    3. Mixed hyperlipidemia  Comments:  lipids stable, improved   rec HHD, exercise and work on weight loss  cont statin  Orders:  -     atorvastatin (LIPITOR) 10 MG tablet; Take 1 tablet by mouth Every Night.  Dispense: 90 tablet; Refill: 1    4. Hypogonadism in male  -     Testosterone Cypionate (DEPOTESTOTERONE CYPIONATE) 200 MG/ML injection; Inject 1 mL into the appropriate muscle as directed by prescriber Every 14 (Fourteen) Days.  Dispense: 6 mL; Refill: 1    5. Stress  reaction    6. Vitamin D deficiency    7. Seasonal allergic rhinitis due to pollen    8. Primary hypertension  Comments:  bp improved at home, cont lisinopril.   check daily, resume hydral if sbp >150  HR stable, hold metop unless HR >100, may need adjusted   cont clonidine prn    9. Other insomnia  Comments:  use hydrox for itching/insomnia    Other orders  -     permethrin (ELIMITE) 5 % cream; Apply 1 Application topically to the appropriate area as directed 1 (One) Time for 1 dose. Apply from head to toe, leave on 12 hours and wash off. May repeat in 5-7 days if needed  Dispense: 60 g; Refill: 1  -     hydrOXYzine (ATARAX) 10 MG tablet; Take 1-2 tablets by mouth Every 6 (Six) Hours As Needed for Itching (insomnia).  Dispense: 60 tablet; Refill: 0  -     vitamin D (ERGOCALCIFEROL) 1.25 MG (66169 UT) capsule capsule; Take 1 capsule by mouth Every 7 (Seven) Days.  Dispense: 15 capsule; Refill: 1          Reviewed labs, WBC elevated, patient was on prednisone taper at time of lab draw      I spent 40 minutes caring for Jaron Espinoza on this date of service. This time includes time spent by me in the following activities: preparing for the visit, reviewing tests, performing a medically appropriate examination and/or evaluation , counseling and educating the patient/family/caregiver, ordering medications, tests, or procedures and documenting information in the medical record        Follow Up     Return in about 6 months (around 11/14/2024) for Recheck, Medicare Wellness. HTN panel and PSA prior to appt .  Patient was given instructions and counseling regarding his condition or for health maintenance advice. Please see specific information pulled into the AVS if appropriate.        Part of this note may be an electronic transcription/translation of spoken language to printed text using the Dragon Dictation System

## 2024-05-16 DIAGNOSIS — I10 PRIMARY HYPERTENSION: ICD-10-CM

## 2024-05-16 RX ORDER — CLONIDINE HYDROCHLORIDE 0.1 MG/1
TABLET ORAL
Qty: 90 TABLET | Refills: 1 | Status: SHIPPED | OUTPATIENT
Start: 2024-05-16

## 2024-06-10 RX ORDER — HYDRALAZINE HYDROCHLORIDE 25 MG/1
25 TABLET, FILM COATED ORAL 3 TIMES DAILY
Qty: 90 TABLET | Refills: 3 | Status: SHIPPED | OUTPATIENT
Start: 2024-06-10

## 2024-06-13 ENCOUNTER — OFFICE VISIT (OUTPATIENT)
Dept: FAMILY MEDICINE CLINIC | Facility: CLINIC | Age: 64
End: 2024-06-13
Payer: MEDICARE

## 2024-06-13 VITALS
HEIGHT: 72 IN | WEIGHT: 192.2 LBS | HEART RATE: 68 BPM | OXYGEN SATURATION: 97 % | TEMPERATURE: 98.6 F | BODY MASS INDEX: 26.03 KG/M2 | DIASTOLIC BLOOD PRESSURE: 87 MMHG | SYSTOLIC BLOOD PRESSURE: 165 MMHG

## 2024-06-13 DIAGNOSIS — K29.00 ACUTE GASTRITIS WITHOUT HEMORRHAGE, UNSPECIFIED GASTRITIS TYPE: Primary | ICD-10-CM

## 2024-06-13 DIAGNOSIS — R25.2 LEG CRAMPING: ICD-10-CM

## 2024-06-13 DIAGNOSIS — E29.1 HYPOGONADISM, MALE: ICD-10-CM

## 2024-06-13 PROCEDURE — 3079F DIAST BP 80-89 MM HG: CPT | Performed by: NURSE PRACTITIONER

## 2024-06-13 PROCEDURE — 99214 OFFICE O/P EST MOD 30 MIN: CPT | Performed by: NURSE PRACTITIONER

## 2024-06-13 PROCEDURE — 1126F AMNT PAIN NOTED NONE PRSNT: CPT | Performed by: NURSE PRACTITIONER

## 2024-06-13 PROCEDURE — 96372 THER/PROPH/DIAG INJ SC/IM: CPT | Performed by: NURSE PRACTITIONER

## 2024-06-13 PROCEDURE — 1159F MED LIST DOCD IN RCRD: CPT | Performed by: NURSE PRACTITIONER

## 2024-06-13 PROCEDURE — 1160F RVW MEDS BY RX/DR IN RCRD: CPT | Performed by: NURSE PRACTITIONER

## 2024-06-13 PROCEDURE — 3077F SYST BP >= 140 MM HG: CPT | Performed by: NURSE PRACTITIONER

## 2024-06-13 RX ORDER — OMEPRAZOLE 40 MG/1
40 CAPSULE, DELAYED RELEASE ORAL DAILY
Qty: 90 CAPSULE | Refills: 1 | Status: SHIPPED | OUTPATIENT
Start: 2024-06-13

## 2024-06-13 RX ORDER — SUCRALFATE 1 G/1
1 TABLET ORAL 4 TIMES DAILY
Qty: 40 TABLET | Refills: 0 | Status: SHIPPED | OUTPATIENT
Start: 2024-06-13 | End: 2024-06-23

## 2024-06-13 RX ADMIN — TESTOSTERONE CYPIONATE 200 MG: 200 INJECTION, SOLUTION INTRAMUSCULAR at 08:26

## 2024-06-13 NOTE — PROGRESS NOTES
Chief Complaint  Chief Complaint   Patient presents with    Chest Pain     Patient states he is having chest pains after he eats. Patient states that it has been going on for about 4 weeks now.            Subjective          Jaron Espinoza presents to [facility] for   History of Present Illness    Chest pain occurs 8-12 min after eating, intense, located mid epigastric to upper left chest. Lying down flat relieves it. Denies shortness of air, palpitations, swelling of extremities. After the pain passes he is fine the rest of the day until the next meal, can be active as usual, pull weeds, work etc. Had a fried fish sandwich, mexican, turkey sandwich/chips, any meal causes the pain to occur. He tries not to eat late.     He also complains of continued leg cramping, drinks minimal water, does take potassium supplement OTC as needed for leg cramps    Hypogonadism, he needs testosterone injection today      The following portions of the patient's history were reviewed and updated as appropriate: allergies, current medications, past family history, past medical history, past social history, past surgical history and problem list.    Past Medical History:   Diagnosis Date    Headache     Hx of hypogonadism     Hyperlipidemia     Hypertension      Past Surgical History:   Procedure Laterality Date    EYELID RETRACTION REPAIR       Family History   Problem Relation Age of Onset    Colon cancer Mother      Social History     Tobacco Use    Smoking status: Former     Current packs/day: 0.00     Average packs/day: 0.5 packs/day for 15.0 years (7.5 ttl pk-yrs)     Types: Cigarettes     Start date:      Quit date:      Years since quittin.4     Passive exposure: Past    Smokeless tobacco: Never    Tobacco comments:     quit x30 years   Substance Use Topics    Alcohol use: No       Current Outpatient Medications:     aspirin 81 MG chewable tablet, Chew 1 tablet Daily., Disp: 30  "tablet, Rfl: 0    atorvastatin (LIPITOR) 10 MG tablet, Take 1 tablet by mouth Every Night., Disp: 90 tablet, Rfl: 1    Azelastine HCl 137 MCG/SPRAY solution, SPRAY TWO SPRAYS IN EACH NOSTRIL TWICE DAILY, Disp: 30 mL, Rfl: 1    cloNIDine (CATAPRES) 0.1 MG tablet, TAKE ONE TABLET BY MOUTH EVERY 6 HOURS AS NEEDED FOR HIGH BLOOD PRESSURE (SYSTOLIC OVER 160), Disp: 90 tablet, Rfl: 1    escitalopram (LEXAPRO) 5 MG tablet, TAKE 1 TABLET BY MOUTH DAILY, Disp: 90 tablet, Rfl: 1    hydrALAZINE (APRESOLINE) 25 MG tablet, TAKE ONE TABLET BY MOUTH THREE TIMES A DAY, Disp: 90 tablet, Rfl: 3    hydrocortisone 2.5 % cream, Apply 1 Application topically to the appropriate area as directed 2 (Two) Times a Day As Needed., Disp: , Rfl:     hydrOXYzine (ATARAX) 10 MG tablet, Take 1-2 tablets by mouth Every 6 (Six) Hours As Needed for Itching (insomnia)., Disp: 60 tablet, Rfl: 0    lisinopril (PRINIVIL,ZESTRIL) 40 MG tablet, Take 1 tablet by mouth Daily., Disp: 90 tablet, Rfl: 1    metoprolol succinate XL (TOPROL-XL) 100 MG 24 hr tablet, TAKE 1 TABLET BY MOUTH DAILY, Disp: 90 tablet, Rfl: 1    Syringe, Disposable, 3 ML misc, Use 1 mL Every 14 (Fourteen) Days., Disp: 2 each, Rfl: 4    Syringe/Needle, Disp, (B-D INTEGRA SYRINGE) 23G X 1\" 3 ML misc, USE TO INJECT TESTOSTERONE ONCE EVERY 14 DAYS, Disp: 2 each, Rfl: 11    Testosterone Cypionate (DEPOTESTOTERONE CYPIONATE) 200 MG/ML injection, Inject 1 mL into the appropriate muscle as directed by prescriber Every 14 (Fourteen) Days., Disp: 6 mL, Rfl: 1    vitamin D (ERGOCALCIFEROL) 1.25 MG (79677 UT) capsule capsule, Take 1 capsule by mouth Every 7 (Seven) Days., Disp: 15 capsule, Rfl: 1    omeprazole (priLOSEC) 40 MG capsule, Take 1 capsule by mouth Daily., Disp: 90 capsule, Rfl: 1    sucralfate (Carafate) 1 g tablet, Take 1 tablet by mouth 4 (Four) Times a Day for 10 days., Disp: 40 tablet, Rfl: 0    Current Facility-Administered Medications:     Testosterone Cypionate (DEPOTESTOTERONE " "CYPIONATE) injection 200 mg, 200 mg, Intramuscular, Q14 Days, Rina Colindres, APRN, 200 mg at 06/13/24 0826    Objective   Vital Signs:   /87 (BP Location: Left arm, Patient Position: Sitting, Cuff Size: Adult)   Pulse 68   Temp 98.6 °F (37 °C) (Temporal)   Ht 182.9 cm (72\")   Wt 87.2 kg (192 lb 3.2 oz)   SpO2 97%   BMI 26.07 kg/m²           Physical Exam  Constitutional:       General: He is not in acute distress.     Appearance: Normal appearance. He is well-developed. He is not ill-appearing or diaphoretic.   HENT:      Head: Normocephalic.   Eyes:      Conjunctiva/sclera: Conjunctivae normal.      Pupils: Pupils are equal, round, and reactive to light.   Neck:      Thyroid: No thyromegaly.      Vascular: No JVD.   Cardiovascular:      Rate and Rhythm: Normal rate and regular rhythm.      Heart sounds: Normal heart sounds. No murmur heard.  Pulmonary:      Effort: Pulmonary effort is normal. No respiratory distress.      Breath sounds: Normal breath sounds. No wheezing or rhonchi.   Abdominal:      General: Bowel sounds are normal. There is no distension.      Palpations: Abdomen is soft. There is no mass.      Tenderness: There is no abdominal tenderness. There is no guarding.   Musculoskeletal:         General: No swelling or tenderness. Normal range of motion.      Cervical back: Normal range of motion and neck supple. No tenderness.   Lymphadenopathy:      Cervical: No cervical adenopathy.   Skin:     General: Skin is warm and dry.      Coloration: Skin is not jaundiced.      Findings: No erythema or rash.   Neurological:      General: No focal deficit present.      Mental Status: He is alert and oriented to person, place, and time. Mental status is at baseline.      Sensory: No sensory deficit.      Motor: No weakness.      Gait: Gait normal.   Psychiatric:         Mood and Affect: Mood normal.         Behavior: Behavior normal.         Thought Content: Thought content normal.         Judgment: " Judgment normal.          Result Review :     No visits with results within 7 Day(s) from this visit.   Latest known visit with results is:   Orders Only on 05/01/2024   Component Date Value Ref Range Status    WBC 05/07/2024 11.75 (H)  3.40 - 10.80 10*3/mm3 Final    RBC 05/07/2024 5.32  4.14 - 5.80 10*6/mm3 Final    Hemoglobin 05/07/2024 15.8  13.0 - 17.7 g/dL Final    Hematocrit 05/07/2024 49.4  37.5 - 51.0 % Final    MCV 05/07/2024 92.9  79.0 - 97.0 fL Final    MCH 05/07/2024 29.7  26.6 - 33.0 pg Final    MCHC 05/07/2024 32.0  31.5 - 35.7 g/dL Final    RDW 05/07/2024 13.1  12.3 - 15.4 % Final    RDW-SD 05/07/2024 44.4  37.0 - 54.0 fl Final    MPV 05/07/2024 9.9  6.0 - 12.0 fL Final    Platelets 05/07/2024 337  140 - 450 10*3/mm3 Final    Glucose 05/07/2024 126 (H)  65 - 99 mg/dL Final    BUN 05/07/2024 25 (H)  8 - 23 mg/dL Final    Creatinine 05/07/2024 1.07  0.76 - 1.27 mg/dL Final    Sodium 05/07/2024 141  136 - 145 mmol/L Final    Potassium 05/07/2024 4.6  3.5 - 5.2 mmol/L Final    Chloride 05/07/2024 103  98 - 107 mmol/L Final    CO2 05/07/2024 24.0  22.0 - 29.0 mmol/L Final    Calcium 05/07/2024 9.6  8.6 - 10.5 mg/dL Final    Total Protein 05/07/2024 7.0  6.0 - 8.5 g/dL Final    Albumin 05/07/2024 4.7  3.5 - 5.2 g/dL Final    ALT (SGPT) 05/07/2024 25  1 - 41 U/L Final    AST (SGOT) 05/07/2024 17  1 - 40 U/L Final    Alkaline Phosphatase 05/07/2024 72  39 - 117 U/L Final    Total Bilirubin 05/07/2024 0.5  0.0 - 1.2 mg/dL Final    Globulin 05/07/2024 2.3  gm/dL Final    A/G Ratio 05/07/2024 2.0  g/dL Final    BUN/Creatinine Ratio 05/07/2024 23.4  7.0 - 25.0 Final    Anion Gap 05/07/2024 14.0  5.0 - 15.0 mmol/L Final    eGFR 05/07/2024 77.0  >60.0 mL/min/1.73 Final    Total Cholesterol 05/07/2024 199  0 - 200 mg/dL Final    Triglycerides 05/07/2024 272 (H)  0 - 150 mg/dL Final    HDL Cholesterol 05/07/2024 53  40 - 60 mg/dL Final    LDL Cholesterol  05/07/2024 100  0 - 100 mg/dL Final    VLDL Cholesterol  05/07/2024 46 (H)  5 - 40 mg/dL Final    LDL/HDL Ratio 05/07/2024 1.73   Final    TSH 05/07/2024 0.842  0.270 - 4.200 uIU/mL Final    Testosterone, Total 05/07/2024 64.50 (L)  193.00 - 740.00 ng/dL Final                              Assessment and Plan    Diagnoses and all orders for this visit:    1. Acute gastritis without hemorrhage, unspecified gastritis type (Primary)    2. Hypogonadism, male    3. Leg cramping    Other orders  -     omeprazole (priLOSEC) 40 MG capsule; Take 1 capsule by mouth Daily.  Dispense: 90 capsule; Refill: 1  -     sucralfate (Carafate) 1 g tablet; Take 1 tablet by mouth 4 (Four) Times a Day for 10 days.  Dispense: 40 tablet; Refill: 0      Start omeprazole and carafate  Avoid spicy, fatty/fried or acidic foods  Increase water intake, try Gatorade every other day for leg cramping    I spent 30 minutes caring for Jaron Espinoza on this date of service. This time includes time spent by me in the following activities: preparing for the visit, reviewing tests, performing a medically appropriate examination and/or evaluation , counseling and educating the patient/family/caregiver, ordering medications, tests, or procedures and documenting information in the medical record        Follow Up     Return for Next scheduled follow up or earlier prn.  Patient was given instructions and counseling regarding his condition or for health maintenance advice. Please see specific information pulled into the AVS if appropriate.        Part of this note may be an electronic transcription/translation of spoken language to printed text using the Dragon Dictation System

## 2024-06-13 NOTE — PROGRESS NOTES
Injection  Injection performed in Left VG by Celia Arriaga MA. Patient tolerated the procedure well without complications.  06/13/24   Celia Arriaga MA

## 2024-07-13 RX ORDER — PERMETHRIN 50 MG/G
CREAM TOPICAL
Qty: 60 G | Refills: 1 | Status: SHIPPED | OUTPATIENT
Start: 2024-07-13

## 2024-07-15 DIAGNOSIS — I10 PRIMARY HYPERTENSION: ICD-10-CM

## 2024-07-15 RX ORDER — CLONIDINE HYDROCHLORIDE 0.1 MG/1
TABLET ORAL
Qty: 90 TABLET | Refills: 1 | Status: SHIPPED | OUTPATIENT
Start: 2024-07-15

## 2024-08-29 ENCOUNTER — CLINICAL SUPPORT (OUTPATIENT)
Dept: FAMILY MEDICINE CLINIC | Facility: CLINIC | Age: 64
End: 2024-08-29
Payer: MEDICARE

## 2024-08-29 DIAGNOSIS — R79.89 LOW TESTOSTERONE: Primary | ICD-10-CM

## 2024-08-29 PROCEDURE — 96372 THER/PROPH/DIAG INJ SC/IM: CPT | Performed by: NURSE PRACTITIONER

## 2024-08-29 RX ADMIN — TESTOSTERONE CYPIONATE 200 MG: 200 INJECTION, SOLUTION INTRAMUSCULAR at 14:41

## 2024-08-29 NOTE — PROGRESS NOTES
Injection  Injection performed in the left ventrogluteal by Janny Goodman MA. Patient tolerated the procedure well without complications.  08/29/24   Janny Goodman MA

## 2024-09-06 RX ORDER — HYDRALAZINE HYDROCHLORIDE 25 MG/1
25 TABLET, FILM COATED ORAL 3 TIMES DAILY
Qty: 270 TABLET | Refills: 0 | Status: SHIPPED | OUTPATIENT
Start: 2024-09-06

## 2024-09-09 ENCOUNTER — CLINICAL SUPPORT (OUTPATIENT)
Dept: FAMILY MEDICINE CLINIC | Facility: CLINIC | Age: 64
End: 2024-09-09
Payer: MEDICARE

## 2024-09-09 DIAGNOSIS — E29.1 HYPOGONADISM IN MALE: Primary | ICD-10-CM

## 2024-09-09 PROCEDURE — 96372 THER/PROPH/DIAG INJ SC/IM: CPT | Performed by: NURSE PRACTITIONER

## 2024-09-09 RX ADMIN — TESTOSTERONE CYPIONATE 200 MG: 200 INJECTION, SOLUTION INTRAMUSCULAR at 12:07

## 2024-09-13 DIAGNOSIS — I10 PRIMARY HYPERTENSION: ICD-10-CM

## 2024-09-15 RX ORDER — CLONIDINE HYDROCHLORIDE 0.1 MG/1
TABLET ORAL
Qty: 90 TABLET | Refills: 1 | Status: SHIPPED | OUTPATIENT
Start: 2024-09-15

## 2024-09-17 DIAGNOSIS — I10 ESSENTIAL HYPERTENSION: ICD-10-CM

## 2024-09-18 RX ORDER — LISINOPRIL 40 MG/1
40 TABLET ORAL DAILY
Qty: 90 TABLET | Refills: 1 | Status: SHIPPED | OUTPATIENT
Start: 2024-09-18

## 2024-09-23 ENCOUNTER — CLINICAL SUPPORT (OUTPATIENT)
Dept: FAMILY MEDICINE CLINIC | Facility: CLINIC | Age: 64
End: 2024-09-23
Payer: MEDICARE

## 2024-09-23 DIAGNOSIS — R79.89 LOW TESTOSTERONE: Primary | ICD-10-CM

## 2024-09-23 PROCEDURE — 96372 THER/PROPH/DIAG INJ SC/IM: CPT | Performed by: NURSE PRACTITIONER

## 2024-09-23 RX ADMIN — TESTOSTERONE CYPIONATE 200 MG: 200 INJECTION, SOLUTION INTRAMUSCULAR at 14:45

## 2024-10-07 ENCOUNTER — CLINICAL SUPPORT (OUTPATIENT)
Dept: FAMILY MEDICINE CLINIC | Facility: CLINIC | Age: 64
End: 2024-10-07
Payer: MEDICARE

## 2024-10-07 DIAGNOSIS — E29.1 HYPOGONADISM, MALE: Primary | ICD-10-CM

## 2024-10-07 PROCEDURE — 96372 THER/PROPH/DIAG INJ SC/IM: CPT | Performed by: NURSE PRACTITIONER

## 2024-10-07 RX ADMIN — TESTOSTERONE CYPIONATE 200 MG: 200 INJECTION, SOLUTION INTRAMUSCULAR at 14:39

## 2024-10-07 NOTE — PROGRESS NOTES
Injection  Injection performed in the left ventrogluteal by Janny Goodman MA. Patient tolerated the procedure well without complications.  10/07/24   Janny Goodman MA

## 2024-10-31 DIAGNOSIS — E78.2 MIXED HYPERLIPIDEMIA: Primary | ICD-10-CM

## 2024-10-31 DIAGNOSIS — I10 ESSENTIAL HYPERTENSION: ICD-10-CM

## 2024-10-31 DIAGNOSIS — Z12.5 SCREENING PSA (PROSTATE SPECIFIC ANTIGEN): ICD-10-CM

## 2024-10-31 DIAGNOSIS — R79.89 LOW TESTOSTERONE: ICD-10-CM

## 2024-11-07 ENCOUNTER — CLINICAL SUPPORT (OUTPATIENT)
Dept: FAMILY MEDICINE CLINIC | Facility: CLINIC | Age: 64
End: 2024-11-07
Payer: MEDICARE

## 2024-11-07 ENCOUNTER — LAB (OUTPATIENT)
Dept: FAMILY MEDICINE CLINIC | Facility: CLINIC | Age: 64
End: 2024-11-07
Payer: MEDICARE

## 2024-11-07 DIAGNOSIS — F43.0 STRESS REACTION: ICD-10-CM

## 2024-11-07 DIAGNOSIS — I10 ESSENTIAL HYPERTENSION: ICD-10-CM

## 2024-11-07 DIAGNOSIS — E29.1 HYPOGONADISM, MALE: Primary | ICD-10-CM

## 2024-11-07 LAB
DEPRECATED RDW RBC AUTO: 39.9 FL (ref 37–54)
ERYTHROCYTE [DISTWIDTH] IN BLOOD BY AUTOMATED COUNT: 12.1 % (ref 12.3–15.4)
HCT VFR BLD AUTO: 47.7 % (ref 37.5–51)
HGB BLD-MCNC: 15.2 G/DL (ref 13–17.7)
MCH RBC QN AUTO: 28.7 PG (ref 26.6–33)
MCHC RBC AUTO-ENTMCNC: 31.9 G/DL (ref 31.5–35.7)
MCV RBC AUTO: 90.2 FL (ref 79–97)
PLATELET # BLD AUTO: 213 10*3/MM3 (ref 140–450)
PMV BLD AUTO: 10.6 FL (ref 6–12)
RBC # BLD AUTO: 5.29 10*6/MM3 (ref 4.14–5.8)
WBC NRBC COR # BLD AUTO: 5.77 10*3/MM3 (ref 3.4–10.8)

## 2024-11-07 PROCEDURE — 80061 LIPID PANEL: CPT | Performed by: NURSE PRACTITIONER

## 2024-11-07 PROCEDURE — 84403 ASSAY OF TOTAL TESTOSTERONE: CPT | Performed by: NURSE PRACTITIONER

## 2024-11-07 PROCEDURE — 36415 COLL VENOUS BLD VENIPUNCTURE: CPT | Performed by: NURSE PRACTITIONER

## 2024-11-07 PROCEDURE — 80053 COMPREHEN METABOLIC PANEL: CPT | Performed by: NURSE PRACTITIONER

## 2024-11-07 PROCEDURE — G0103 PSA SCREENING: HCPCS | Performed by: NURSE PRACTITIONER

## 2024-11-07 PROCEDURE — 96372 THER/PROPH/DIAG INJ SC/IM: CPT | Performed by: NURSE PRACTITIONER

## 2024-11-07 PROCEDURE — 84443 ASSAY THYROID STIM HORMONE: CPT | Performed by: NURSE PRACTITIONER

## 2024-11-07 PROCEDURE — 85027 COMPLETE CBC AUTOMATED: CPT | Performed by: NURSE PRACTITIONER

## 2024-11-07 RX ORDER — METOPROLOL SUCCINATE 100 MG/1
100 TABLET, EXTENDED RELEASE ORAL DAILY
Qty: 90 TABLET | Refills: 1 | Status: SHIPPED | OUTPATIENT
Start: 2024-11-07

## 2024-11-07 RX ORDER — ESCITALOPRAM OXALATE 5 MG/1
5 TABLET ORAL DAILY
Qty: 90 TABLET | Refills: 1 | Status: SHIPPED | OUTPATIENT
Start: 2024-11-07

## 2024-11-07 RX ADMIN — TESTOSTERONE CYPIONATE 200 MG: 200 INJECTION, SOLUTION INTRAMUSCULAR at 08:20

## 2024-11-07 NOTE — PROGRESS NOTES
Injection  Injection performed in left ventrogluteal by Megan Manjarrez MA. Patient tolerated the procedure well without complications.  11/07/24   Megan Manjarrez MA

## 2024-11-08 LAB
ALBUMIN SERPL-MCNC: 3.9 G/DL (ref 3.5–5.2)
ALBUMIN/GLOB SERPL: 1.5 G/DL
ALP SERPL-CCNC: 64 U/L (ref 39–117)
ALT SERPL W P-5'-P-CCNC: 27 U/L (ref 1–41)
ANION GAP SERPL CALCULATED.3IONS-SCNC: 8.1 MMOL/L (ref 5–15)
AST SERPL-CCNC: 27 U/L (ref 1–40)
BILIRUB SERPL-MCNC: 0.3 MG/DL (ref 0–1.2)
BUN SERPL-MCNC: 20 MG/DL (ref 8–23)
BUN/CREAT SERPL: 15.7 (ref 7–25)
CALCIUM SPEC-SCNC: 9.2 MG/DL (ref 8.6–10.5)
CHLORIDE SERPL-SCNC: 105 MMOL/L (ref 98–107)
CHOLEST SERPL-MCNC: 187 MG/DL (ref 0–200)
CO2 SERPL-SCNC: 24.9 MMOL/L (ref 22–29)
CREAT SERPL-MCNC: 1.27 MG/DL (ref 0.76–1.27)
EGFRCR SERPLBLD CKD-EPI 2021: 62.3 ML/MIN/1.73
GLOBULIN UR ELPH-MCNC: 2.6 GM/DL
GLUCOSE SERPL-MCNC: 87 MG/DL (ref 65–99)
HDLC SERPL-MCNC: 31 MG/DL (ref 40–60)
LDLC SERPL CALC-MCNC: 105 MG/DL (ref 0–100)
LDLC/HDLC SERPL: 3.12 {RATIO}
POTASSIUM SERPL-SCNC: 4.1 MMOL/L (ref 3.5–5.2)
PROT SERPL-MCNC: 6.5 G/DL (ref 6–8.5)
PSA SERPL-MCNC: 1.68 NG/ML (ref 0–4)
SODIUM SERPL-SCNC: 138 MMOL/L (ref 136–145)
TESTOST SERPL-MCNC: 126 NG/DL (ref 193–740)
TRIGL SERPL-MCNC: 297 MG/DL (ref 0–150)
TSH SERPL DL<=0.05 MIU/L-ACNC: 3.43 UIU/ML (ref 0.27–4.2)
VLDLC SERPL-MCNC: 51 MG/DL (ref 5–40)

## 2024-11-11 DIAGNOSIS — I10 PRIMARY HYPERTENSION: ICD-10-CM

## 2024-11-11 RX ORDER — CLONIDINE HYDROCHLORIDE 0.1 MG/1
TABLET ORAL
Qty: 90 TABLET | Refills: 1 | Status: SHIPPED | OUTPATIENT
Start: 2024-11-11

## 2024-11-14 ENCOUNTER — OFFICE VISIT (OUTPATIENT)
Dept: FAMILY MEDICINE CLINIC | Facility: CLINIC | Age: 64
End: 2024-11-14
Payer: MEDICARE

## 2024-11-14 VITALS
DIASTOLIC BLOOD PRESSURE: 96 MMHG | HEIGHT: 72 IN | SYSTOLIC BLOOD PRESSURE: 159 MMHG | OXYGEN SATURATION: 98 % | HEART RATE: 72 BPM | BODY MASS INDEX: 26.55 KG/M2 | WEIGHT: 196 LBS

## 2024-11-14 DIAGNOSIS — G47.09 OTHER INSOMNIA: ICD-10-CM

## 2024-11-14 DIAGNOSIS — R07.9 CHEST PAIN, UNSPECIFIED TYPE: ICD-10-CM

## 2024-11-14 DIAGNOSIS — E55.9 VITAMIN D DEFICIENCY: ICD-10-CM

## 2024-11-14 DIAGNOSIS — E29.1 HYPOGONADISM IN MALE: ICD-10-CM

## 2024-11-14 DIAGNOSIS — E78.2 MIXED HYPERLIPIDEMIA: ICD-10-CM

## 2024-11-14 DIAGNOSIS — F41.9 CHRONIC ANXIETY: ICD-10-CM

## 2024-11-14 DIAGNOSIS — I10 PRIMARY HYPERTENSION: Primary | ICD-10-CM

## 2024-11-14 DIAGNOSIS — K21.9 GASTROESOPHAGEAL REFLUX DISEASE WITHOUT ESOPHAGITIS: ICD-10-CM

## 2024-11-14 RX ORDER — ERGOCALCIFEROL 1.25 MG/1
50000 CAPSULE, LIQUID FILLED ORAL
Qty: 15 CAPSULE | Refills: 1 | Status: SHIPPED | OUTPATIENT
Start: 2024-11-14

## 2024-11-14 RX ORDER — CLOBETASOL PROPIONATE 0.5 MG/G
CREAM TOPICAL
COMMUNITY
Start: 2024-09-23

## 2024-11-14 RX ORDER — ATORVASTATIN CALCIUM 10 MG/1
10 TABLET, FILM COATED ORAL NIGHTLY
Qty: 90 TABLET | Refills: 1 | Status: SHIPPED | OUTPATIENT
Start: 2024-11-14

## 2024-11-14 NOTE — PROGRESS NOTES
The ABCs of the Annual Wellness Visit  Subsequent Medicare Wellness Visit    Chief Complaint   Patient presents with    Medicare Wellness-subsequent     Labs done 11/7/24     Subjective     History of Present Illness:  Jaron Espinoza is a 64 y.o. male who presents for a Subsequent Medicare Wellness Visit, to review labs and follow up on chronic conditions.  HPI    Patient was seen in June with chest pain occurring immediately after eating, treated with omeprazole and Carafate, reports bp spikes after eating, takes clonidine and chest pain has resolved.     Hypogonadism, has not had T shot for about 1.5mo    HTN, stable on meds and takes as directed, denies chest pain, headache, shortness of air, palpitations and swelling of extremities.     Hyperlipidemia, elevated trigs, the patient denies muscle aches, constipation, diarrhea, GI upset, fatigue, chest pain/pressure, exercise intolerance, dyspnea, palpitations, syncope and pedal edema.        The following portions of the patient's history were reviewed and   updated as appropriate: allergies, current medications, past family history, past medical history, past social history, past surgical history, and problem list.      Compared to one year ago, the patient feels his physical   health is the same.    Compared to one year ago, the patient feels his mental   health is the same.    Recent Hospitalizations:  He was not admitted to the hospital during the last year.       Current Medical Providers:  Patient Care Team:  Rina Colindres APRN as PCP - General (Nurse Practitioner)    Outpatient Medications Prior to Visit   Medication Sig Dispense Refill    aspirin 81 MG chewable tablet Chew 1 tablet Daily. 30 tablet 0    Azelastine HCl 137 MCG/SPRAY solution SPRAY TWO SPRAYS IN EACH NOSTRIL TWICE DAILY 30 mL 1    clobetasol propionate (TEMOVATE) 0.05 % cream       cloNIDine (CATAPRES) 0.1 MG tablet TAKE ONE TABLET BY MOUTH EVERY 6 HOURS AS NEEDED HIGH BLOOD  "PRESSURE 90 tablet 1    escitalopram (LEXAPRO) 5 MG tablet TAKE 1 TABLET BY MOUTH DAILY 90 tablet 1    hydrALAZINE (APRESOLINE) 25 MG tablet TAKE 1 TABLET BY MOUTH 3 TIMES A  tablet 0    hydrocortisone 2.5 % cream Apply 1 Application topically to the appropriate area as directed 2 (Two) Times a Day As Needed.      hydrOXYzine (ATARAX) 10 MG tablet Take 1-2 tablets by mouth Every 6 (Six) Hours As Needed for Itching (insomnia). 60 tablet 0    lisinopril (PRINIVIL,ZESTRIL) 40 MG tablet TAKE 1 TABLET BY MOUTH DAILY 90 tablet 1    omeprazole (priLOSEC) 40 MG capsule Take 1 capsule by mouth Daily. 90 capsule 1    permethrin (ELIMITE) 5 % cream THOROUGHLY MASSAGE CREAM INTO THE SKIN FROM HEAD TO THE SOLES OF THE FEET. AVOID EYES, NOSE, AND MOUTH. REMOVE BY WASHING AFTER 12 HOURS. MAY REPEAT IF 5 TO 7 DAYS IF NEEDED 60 g 1    Syringe, Disposable, 3 ML misc Use 1 mL Every 14 (Fourteen) Days. 2 each 4    Syringe/Needle, Disp, (B-D INTEGRA SYRINGE) 23G X 1\" 3 ML misc USE TO INJECT TESTOSTERONE ONCE EVERY 14 DAYS 2 each 11    Testosterone Cypionate (DEPOTESTOTERONE CYPIONATE) 200 MG/ML injection Inject 1 mL into the appropriate muscle as directed by prescriber Every 14 (Fourteen) Days. 6 mL 1    atorvastatin (LIPITOR) 10 MG tablet Take 1 tablet by mouth Every Night. 90 tablet 1    vitamin D (ERGOCALCIFEROL) 1.25 MG (80750 UT) capsule capsule Take 1 capsule by mouth Every 7 (Seven) Days. 15 capsule 1    metoprolol succinate XL (TOPROL-XL) 100 MG 24 hr tablet TAKE 1 TABLET BY MOUTH DAILY 90 tablet 1    Testosterone Cypionate (DEPOTESTOTERONE CYPIONATE) injection 200 mg  (Patient taking differently: Inject 1 mL into the appropriate muscle as directed by prescriber Every 14 (Fourteen) Days.)       No facility-administered medications prior to visit.       No opioid medication identified on active medication list. I have reviewed chart for other potential  high risk medication/s and harmful drug interactions in the " "elderly.        Aspirin is on active medication list. Aspirin use is indicated based on review of current medical condition/s. Pros and cons of this therapy have been discussed today. Benefits of this medication outweigh potential harm.  Patient has been encouraged to continue taking this medication.  .      Patient Active Problem List   Diagnosis    Abnormal cardiovascular stress test    Acute bronchitis    Allergic conjunctivitis    Chest pain    Chronic anxiety    Degenerative arthritis    Dizziness    Fatigue    General medical exam    Hearing loss    History of transient ischemic attack    Hyperlipidemia    Hypertension    Hypogonadism, male    Hypothyroidism    Pain in joint    Low testosterone    Occipital headache    Seborrheic keratosis    Temporal arteritis    Chronic daily headache    Transient ischemic attack (TIA)    Hypertensive encephalopathy     Advance Care Planning   Advance Directive is not on file.  ACP discussion was held with the patient during this visit. Patient does not have an advance directive, information provided.    Reviewed chart for potential of high risk medication in the elderly: yes  Reviewed chart for potential of harmful drug interactions in the elderly:yes       Objective       Vitals:    11/14/24 1347   BP: 159/96   BP Location: Left arm   Patient Position: Sitting   Cuff Size: Adult   Pulse: 72   SpO2: 98%   Weight: 88.9 kg (196 lb)   Height: 182.9 cm (72\")   PainSc: 0-No pain     BMI Readings from Last 1 Encounters:   11/14/24 26.58 kg/m²   BMI is within normal parameters. No follow-up required.  BMI Readings from Last 1 Encounters:   11/14/24 26.58 kg/m²   BMI is above normal parameters. Recommendations include: exercise counseling and nutrition counseling    Does the patient have evidence of cognitive impairment? No        Physical Exam  Constitutional:       General: He is not in acute distress.     Appearance: Normal appearance. He is well-developed. He is not " ill-appearing or diaphoretic.   HENT:      Head: Normocephalic.   Eyes:      Conjunctiva/sclera: Conjunctivae normal.      Pupils: Pupils are equal, round, and reactive to light.   Neck:      Thyroid: No thyromegaly.      Vascular: No JVD.   Cardiovascular:      Rate and Rhythm: Normal rate and regular rhythm.      Heart sounds: Normal heart sounds. No murmur heard.  Pulmonary:      Effort: Pulmonary effort is normal. No respiratory distress.      Breath sounds: Normal breath sounds. No wheezing or rhonchi.   Abdominal:      General: Bowel sounds are normal. There is no distension.      Palpations: Abdomen is soft.      Tenderness: There is no abdominal tenderness.   Musculoskeletal:         General: No swelling or tenderness. Normal range of motion.      Cervical back: Normal range of motion and neck supple. No tenderness.   Lymphadenopathy:      Cervical: No cervical adenopathy.   Skin:     General: Skin is warm and dry.      Coloration: Skin is not jaundiced.      Findings: No erythema or rash.   Neurological:      General: No focal deficit present.      Mental Status: He is alert and oriented to person, place, and time. Mental status is at baseline.      Sensory: No sensory deficit.      Motor: No weakness.      Gait: Gait normal.   Psychiatric:         Mood and Affect: Mood normal.         Behavior: Behavior normal.         Thought Content: Thought content normal.         Judgment: Judgment normal.           Lab Results   Component Value Date    TRIG 297 (H) 2024    HDL 31 (L) 2024     (H) 2024    VLDL 51 (H) 2024          HEALTH RISK ASSESSMENT    Smoking Status:  Social History     Tobacco Use   Smoking Status Former    Current packs/day: 0.00    Average packs/day: 0.5 packs/day for 15.0 years (7.5 ttl pk-yrs)    Types: Cigarettes    Start date:     Quit date:     Years since quittin.9    Passive exposure: Past   Smokeless Tobacco Never   Tobacco Comments    quit  x30 years     Alcohol Consumption:  Social History     Substance and Sexual Activity   Alcohol Use No     Fall Risk Screen:    ARRON Fall Risk Assessment was completed, and patient is at MODERATE risk for falls. Assessment completed on:2024    Depression Screen:       2024     1:40 PM   PHQ-2/PHQ-9 Depression Screening   Little interest or pleasure in doing things Several days   Feeling down, depressed, or hopeless Not at all       Health Habits and Functional and Cognitive Screenin/14/2024     1:00 PM   Functional & Cognitive Status   Do you have difficulty preparing food and eating? No   Do you have difficulty bathing yourself, getting dressed or grooming yourself? No   Do you have difficulty using the toilet? No   Do you have difficulty moving around from place to place? No   Do you have trouble with steps or getting out of a bed or a chair? No   Current Diet Well Balanced Diet   Dental Exam Not up to date   Eye Exam Up to date   Exercise (times per week) 5 times per week   Current Exercises Include Other   Do you need help using the phone?  No   Are you deaf or do you have serious difficulty hearing?  Yes   Do you need help to go to places out of walking distance? No   Do you need help shopping? No   Do you need help preparing meals?  No   Do you need help with housework?  No   Do you need help with laundry? No   Do you need help taking your medications? No   Do you need help managing money? No   Do you ever drive or ride in a car without wearing a seat belt? No   Have you felt unusual stress, anger or loneliness in the last month? No   Who do you live with? Alone   If you need help, do you have trouble finding someone available to you? Yes   Have you been bothered in the last four weeks by sexual problems? No   Do you have difficulty concentrating, remembering or making decisions? No       ATTENTION  What is the year: correct  What is the month of the year: correct  What is the day of the  week?: correct  What is the date?: correct  MEMORY  Repeat address three times, only score third attempt: [address], [place]: 6  HOW MANY ANIMALS DID THE PATIENT NAME  Verbal Fluency -- Animal Names (0-25): 22+  CLOCK DRAWING  Clock Drawing: All Correct  MEMORY RECALL  Tell me what you remember about that name and address we were repeating at the beginnin  ACE TOTAL SCORE  Total ACE Score - <25/30 strongly suggests cognitive impairment; <21/30 almost certainly shows dementia: 28    Age-appropriate Screening Schedule:  Refer to the list below for future screening recommendations based on patient's age, sex and/or medical conditions. Orders for these recommended tests are listed in the plan section. The patient has been provided with a written plan.    Health Maintenance   Topic Date Due    ZOSTER VACCINE (1 of 2) Never done    Pneumococcal Vaccine 65+ (1 of 1 - PCV) Never done    ANNUAL WELLNESS VISIT  2024    LIPID PANEL  2025    BMI FOLLOWUP  2025    COLORECTAL CANCER SCREENING  2026    TDAP/TD VACCINES (2 - Td or Tdap) 2033    HEPATITIS C SCREENING  Completed    AAA SCREEN (ONE-TIME)  Completed    COVID-19 Vaccine  Discontinued    INFLUENZA VACCINE  Discontinued            Assessment & Plan      CMS Preventative Services Quick Reference  Risk Factors Identified During Encounter  Immunizations Discussed/Encouraged: Prevnar 20 (Pneumococcal 20-valent conjugate) and Shingrix  The above risks/problems have been discussed with the patient.  Follow up actions/plans if indicated are seen below in the Assessment/Plan Section.  Pertinent information has been shared with the patient in the After Visit Summary.    Diagnoses and all orders for this visit:    1. Primary hypertension (Primary)    2. Mixed hyperlipidemia  Comments:  lipids worse  rec HHD, exercise and work on weight loss  cont statin  Orders:  -     atorvastatin (LIPITOR) 10 MG tablet; Take 1  tablet by mouth Every Night.  Dispense: 90 tablet; Refill: 1    3. Hypogonadism in male    4. Vitamin D deficiency    5. Other insomnia    6. Gastroesophageal reflux disease without esophagitis    7. Chest pain, unspecified type    8. Chronic anxiety    Other orders  -     vitamin D (ERGOCALCIFEROL) 1.25 MG (25892 UT) capsule capsule; Take 1 capsule by mouth Every 7 (Seven) Days.  Dispense: 15 capsule; Refill: 1    Conditions stable, rf meds as above  Cont current med regimen  Resume T injections monthly  Labs reviewed and essentially stable, with elevated lipids, work on HHD/exercise  Age appropriate preventative counseling provided, including healthy lifestyle modifications and exercise        Follow Up:   Return in about 6 months (around 5/14/2025) for Recheck. HTN/HLD, low T. htn panel and total T prior to appt .     An After Visit Summary and PPPS were given to the patient.            EMR Dragon transcription disclaimer:  Part of this note may be an electronic transcription/translation of spoken language to printed text using the Dragon Dictation System.

## 2024-11-21 ENCOUNTER — CLINICAL SUPPORT (OUTPATIENT)
Dept: FAMILY MEDICINE CLINIC | Facility: CLINIC | Age: 64
End: 2024-11-21
Payer: MEDICARE

## 2024-11-21 DIAGNOSIS — R79.89 LOW TESTOSTERONE: Primary | ICD-10-CM

## 2024-11-21 DIAGNOSIS — E29.1 HYPOGONADISM IN MALE: Primary | ICD-10-CM

## 2024-11-21 RX ORDER — TESTOSTERONE CYPIONATE 200 MG/ML
200 INJECTION, SOLUTION INTRAMUSCULAR
Status: SHIPPED | OUTPATIENT
Start: 2024-11-21

## 2024-11-21 RX ADMIN — TESTOSTERONE CYPIONATE 200 MG: 200 INJECTION, SOLUTION INTRAMUSCULAR at 14:38

## 2024-11-21 NOTE — PROGRESS NOTES
Injection  Injection performed in the left ventrogluteal by Janny Goodman MA. Patient tolerated the procedure well without complications.  11/21/24   Janny Goodman MA

## 2024-12-02 RX ORDER — HYDRALAZINE HYDROCHLORIDE 25 MG/1
25 TABLET, FILM COATED ORAL 3 TIMES DAILY
Qty: 270 TABLET | Refills: 0 | Status: SHIPPED | OUTPATIENT
Start: 2024-12-02

## 2024-12-05 ENCOUNTER — CLINICAL SUPPORT (OUTPATIENT)
Dept: FAMILY MEDICINE CLINIC | Facility: CLINIC | Age: 64
End: 2024-12-05
Payer: MEDICARE

## 2024-12-05 DIAGNOSIS — E29.1 HYPOGONADISM, MALE: Primary | ICD-10-CM

## 2024-12-05 PROCEDURE — 96372 THER/PROPH/DIAG INJ SC/IM: CPT | Performed by: NURSE PRACTITIONER

## 2024-12-05 RX ADMIN — TESTOSTERONE CYPIONATE 200 MG: 200 INJECTION, SOLUTION INTRAMUSCULAR at 15:34

## 2024-12-05 NOTE — PROGRESS NOTES
Injection  Injection performed in Left ventrogluteal by Megan Manjarrez MA. Patient tolerated the procedure well without complications.  12/05/24   Megan Manjarrez MA

## 2024-12-13 RX ORDER — OMEPRAZOLE 40 MG/1
40 CAPSULE, DELAYED RELEASE ORAL DAILY
Qty: 90 CAPSULE | Refills: 1 | Status: SHIPPED | OUTPATIENT
Start: 2024-12-13

## 2024-12-18 DIAGNOSIS — I10 PRIMARY HYPERTENSION: ICD-10-CM

## 2024-12-18 RX ORDER — CLONIDINE HYDROCHLORIDE 0.1 MG/1
TABLET ORAL
Qty: 90 TABLET | Refills: 1 | Status: SHIPPED | OUTPATIENT
Start: 2024-12-18

## 2024-12-23 ENCOUNTER — TELEPHONE (OUTPATIENT)
Dept: FAMILY MEDICINE CLINIC | Facility: CLINIC | Age: 64
End: 2024-12-23

## 2024-12-23 NOTE — TELEPHONE ENCOUNTER
Caller: Jaron Espinoza    Relationship: Self    Best call back number: [phone] , PATIENT STATED TO LEAVE A MESSAGE IF HE IS UNABLE TO ANSWER    Who are you requesting to speak with (clinical staff, provider,  specific staff member): CLINICAL STAFF     What was the call regarding: cloNIDine (CATAPRES) 0.1 MG tablet    PATIENT STATED THAT THE BOTTLE STATES TO TAKE IT EVERY 6 HOURS AS NEEDED FOR HIGH BLOOD PRESSURE. THE PAPER HE RECEIVED FROM THE PHARMACY STATED TO TAKE THE MEDICATION ONCE IN MORNING AND ONCE BEFORE BED. PATIENT STARTED TAKING IT ONCE IN THE MORNING AND ONCE AT NIGHT FOR THE PAST THREE DAYS. PATIENT STATED THAT THIS MORNING, HIS EYES WERE JUMPING, HE IS CONCERNED THAT THIS IS A POSSIBLE SIDE EFFECT. PATIENT WOULD LIKE TO CLARIFY THE CORRECT WAY HE SHOULD BE TAKING THE PRESCRIPTION. PLEASE ADVISE.

## 2025-01-07 DIAGNOSIS — I10 ESSENTIAL HYPERTENSION: ICD-10-CM

## 2025-01-07 RX ORDER — LISINOPRIL 40 MG/1
40 TABLET ORAL DAILY
Qty: 90 TABLET | Refills: 1 | Status: SHIPPED | OUTPATIENT
Start: 2025-01-07

## 2025-01-16 ENCOUNTER — CLINICAL SUPPORT (OUTPATIENT)
Dept: FAMILY MEDICINE CLINIC | Facility: CLINIC | Age: 65
End: 2025-01-16
Payer: MEDICARE

## 2025-01-16 DIAGNOSIS — E29.1 HYPOGONADISM, MALE: Primary | ICD-10-CM

## 2025-01-16 PROCEDURE — 96372 THER/PROPH/DIAG INJ SC/IM: CPT | Performed by: NURSE PRACTITIONER

## 2025-01-16 RX ADMIN — TESTOSTERONE CYPIONATE 200 MG: 200 INJECTION, SOLUTION INTRAMUSCULAR at 14:18

## 2025-01-16 NOTE — PROGRESS NOTES
Injection  Injection performed in the right ventrogluteal by Janny Goodman MA. Patient tolerated the procedure well without complications.  01/16/25   Janny Goodman MA

## 2025-01-28 DIAGNOSIS — I10 PRIMARY HYPERTENSION: ICD-10-CM

## 2025-01-28 RX ORDER — CLONIDINE HYDROCHLORIDE 0.1 MG/1
TABLET ORAL
Qty: 90 TABLET | Refills: 1 | Status: SHIPPED | OUTPATIENT
Start: 2025-01-28

## 2025-01-31 ENCOUNTER — CLINICAL SUPPORT (OUTPATIENT)
Dept: FAMILY MEDICINE CLINIC | Facility: CLINIC | Age: 65
End: 2025-01-31
Payer: MEDICARE

## 2025-01-31 DIAGNOSIS — R79.89 LOW TESTOSTERONE: Primary | ICD-10-CM

## 2025-01-31 PROCEDURE — 96372 THER/PROPH/DIAG INJ SC/IM: CPT | Performed by: NURSE PRACTITIONER

## 2025-01-31 RX ADMIN — TESTOSTERONE CYPIONATE 200 MG: 200 INJECTION, SOLUTION INTRAMUSCULAR at 14:00

## 2025-01-31 NOTE — PROGRESS NOTES
Injection  Injection performed in the left ventrogluteal by Janny Goodman MA. Patient tolerated the procedure well without complications.  01/31/25   Janny Goodman MA

## 2025-02-20 RX ORDER — ERGOCALCIFEROL 1.25 MG/1
50000 CAPSULE, LIQUID FILLED ORAL WEEKLY
Qty: 12 CAPSULE | Refills: 1 | Status: SHIPPED | OUTPATIENT
Start: 2025-02-20

## 2025-02-26 RX ORDER — HYDRALAZINE HYDROCHLORIDE 25 MG/1
25 TABLET, FILM COATED ORAL 3 TIMES DAILY
Qty: 270 TABLET | Refills: 0 | Status: SHIPPED | OUTPATIENT
Start: 2025-02-26 | End: 2025-03-03

## 2025-02-28 ENCOUNTER — CLINICAL SUPPORT (OUTPATIENT)
Dept: FAMILY MEDICINE CLINIC | Facility: CLINIC | Age: 65
End: 2025-02-28
Payer: MEDICARE

## 2025-02-28 DIAGNOSIS — E29.1 HYPOGONADISM, MALE: Primary | ICD-10-CM

## 2025-02-28 RX ADMIN — TESTOSTERONE CYPIONATE 200 MG: 200 INJECTION, SOLUTION INTRAMUSCULAR at 14:55

## 2025-02-28 NOTE — PROGRESS NOTES
Injection  Injection performed in left ventrogluteal by Megan Manjarrez MA. Patient tolerated the procedure well without complications.  02/28/25   Megan Manjarrez MA

## 2025-03-03 ENCOUNTER — OFFICE VISIT (OUTPATIENT)
Dept: FAMILY MEDICINE CLINIC | Facility: CLINIC | Age: 65
End: 2025-03-03
Payer: MEDICARE

## 2025-03-03 VITALS
TEMPERATURE: 98.3 F | HEIGHT: 72 IN | WEIGHT: 196.4 LBS | DIASTOLIC BLOOD PRESSURE: 78 MMHG | HEART RATE: 70 BPM | SYSTOLIC BLOOD PRESSURE: 123 MMHG | BODY MASS INDEX: 26.6 KG/M2 | OXYGEN SATURATION: 96 %

## 2025-03-03 DIAGNOSIS — H91.93 BILATERAL HEARING LOSS, UNSPECIFIED HEARING LOSS TYPE: ICD-10-CM

## 2025-03-03 DIAGNOSIS — E78.2 MIXED HYPERLIPIDEMIA: ICD-10-CM

## 2025-03-03 DIAGNOSIS — J30.1 SEASONAL ALLERGIC RHINITIS DUE TO POLLEN: ICD-10-CM

## 2025-03-03 DIAGNOSIS — H93.8X2 EAR FULLNESS, LEFT: Primary | ICD-10-CM

## 2025-03-03 DIAGNOSIS — R05.1 ACUTE COUGH: ICD-10-CM

## 2025-03-03 DIAGNOSIS — G47.09 OTHER INSOMNIA: ICD-10-CM

## 2025-03-03 DIAGNOSIS — R53.83 FATIGUE, UNSPECIFIED TYPE: ICD-10-CM

## 2025-03-03 DIAGNOSIS — I10 PRIMARY HYPERTENSION: ICD-10-CM

## 2025-03-03 PROCEDURE — 3078F DIAST BP <80 MM HG: CPT | Performed by: NURSE PRACTITIONER

## 2025-03-03 PROCEDURE — 1159F MED LIST DOCD IN RCRD: CPT | Performed by: NURSE PRACTITIONER

## 2025-03-03 PROCEDURE — 1160F RVW MEDS BY RX/DR IN RCRD: CPT | Performed by: NURSE PRACTITIONER

## 2025-03-03 PROCEDURE — 3074F SYST BP LT 130 MM HG: CPT | Performed by: NURSE PRACTITIONER

## 2025-03-03 PROCEDURE — 1126F AMNT PAIN NOTED NONE PRSNT: CPT | Performed by: NURSE PRACTITIONER

## 2025-03-03 PROCEDURE — 99215 OFFICE O/P EST HI 40 MIN: CPT | Performed by: NURSE PRACTITIONER

## 2025-03-03 RX ORDER — AZELASTINE HYDROCHLORIDE 137 UG/1
2 SPRAY, METERED NASAL 2 TIMES DAILY
Qty: 30 ML | Refills: 1 | Status: SHIPPED | OUTPATIENT
Start: 2025-03-03

## 2025-03-03 NOTE — PROGRESS NOTES
Chief Complaint  Chief Complaint   Patient presents with    Ear Fullness     Feels like water sloshing in right ear x 2-3 weeks    Cough     Dry cough at night- is asking if may be related to lisinopril           Subjective          Jaron Espinoza presents to [facility] for   History of Present Illness      Patient presents for acute same-day visit for ear fullness and cough as mentioned in chief complaint. Cough has been present for several weeks, only at night for 5-10 minutes when he first lies down, he has been on lisinopril since 2018. Reports clear nasal drainage, not using any AR meds.     He is currently only taking lisinopril daily and clonidine prn. He reports sensation of flushing, nervousness, stomach ache and back pain after eating, the clonidine resolves this. He stopped hydralazine, atorvastatin, Lexapro, omeprazole and aspirin.     He complains of insomnia, has been prescribed hydroxyzine and Lexapro for this, he is not taking either    Itching left leg/low back, uses steroid cream prn, saw dermatology    He complains of fatigue, he is taking testosterone IM        The following portions of the patient's history were reviewed and updated as appropriate: allergies, current medications, past family history, past medical history, past social history, past surgical history and problem list.    Past Medical History:   Diagnosis Date    Headache     Hx of hypogonadism     Hyperlipidemia     Hypertension      Past Surgical History:   Procedure Laterality Date    EYELID RETRACTION REPAIR       Family History   Problem Relation Age of Onset    Colon cancer Mother      Social History     Tobacco Use    Smoking status: Former     Current packs/day: 0.00     Average packs/day: 0.5 packs/day for 15.0 years (7.5 ttl pk-yrs)     Types: Cigarettes     Start date:      Quit date:      Years since quittin.1     Passive exposure: Past    Smokeless tobacco: Never     "Tobacco comments:     quit x30 years   Substance Use Topics    Alcohol use: No       Current Outpatient Medications:     aspirin 81 MG chewable tablet, Chew 1 tablet Daily., Disp: 30 tablet, Rfl: 0    atorvastatin (LIPITOR) 10 MG tablet, Take 1 tablet by mouth Every Night., Disp: 90 tablet, Rfl: 1    Azelastine HCl 137 MCG/SPRAY solution, 2 sprays by Each Nare route 2 (Two) Times a Day., Disp: 30 mL, Rfl: 1    clobetasol propionate (TEMOVATE) 0.05 % cream, , Disp: , Rfl:     cloNIDine (CATAPRES) 0.1 MG tablet, TAKE ONE TABLET BY MOUTH EVERY 6 HOURS AS NEEDED FOR HIGH BLOOD PRESSURE, Disp: 90 tablet, Rfl: 1    hydrOXYzine (ATARAX) 10 MG tablet, Take 1-2 tablets by mouth Every 6 (Six) Hours As Needed for Itching (insomnia)., Disp: 60 tablet, Rfl: 0    lisinopril (PRINIVIL,ZESTRIL) 40 MG tablet, TAKE 1 TABLET BY MOUTH DAILY, Disp: 90 tablet, Rfl: 1    omeprazole (priLOSEC) 40 MG capsule, TAKE 1 CAPSULE BY MOUTH DAILY, Disp: 90 capsule, Rfl: 1    Syringe, Disposable, 3 ML misc, Use 1 mL Every 14 (Fourteen) Days., Disp: 2 each, Rfl: 4    Syringe/Needle, Disp, (B-D INTEGRA SYRINGE) 23G X 1\" 3 ML misc, USE TO INJECT TESTOSTERONE ONCE EVERY 14 DAYS, Disp: 2 each, Rfl: 11    Testosterone Cypionate (DEPOTESTOTERONE CYPIONATE) 200 MG/ML injection, Inject 1 mL into the appropriate muscle as directed by prescriber Every 14 (Fourteen) Days., Disp: 6 mL, Rfl: 1    vitamin D (ERGOCALCIFEROL) 1.25 MG (53672 UT) capsule capsule, TAKE 1 CAPSULE BY MOUTH ONCE WEEKLY, Disp: 12 capsule, Rfl: 1    Current Facility-Administered Medications:     Testosterone Cypionate (DEPOTESTOTERONE CYPIONATE) injection 200 mg, 200 mg, Intramuscular, Q14 Days, Rina Colindres APRN, 200 mg at 02/28/25 1455    Objective   Vital Signs:   Vitals:    03/03/25 1306   BP: 123/78   BP Location: Left arm   Patient Position: Sitting   Cuff Size: Adult   Pulse: 70   Temp: 98.3 °F (36.8 °C)   TempSrc: Oral   SpO2: 96%   Weight: 89.1 kg (196 lb 6.4 oz)   Height: 182.9 " "cm (72\")   PainSc: 0-No pain       Body mass index is 26.64 kg/m².    Physical Exam  Constitutional:       General: He is not in acute distress.     Appearance: Normal appearance. He is well-developed. He is not ill-appearing or diaphoretic.   HENT:      Head: Normocephalic.      Right Ear: Tympanic membrane and ear canal normal.      Left Ear: Tympanic membrane and ear canal normal.      Nose: Rhinorrhea (blue/boggy turbinates +2) present.   Eyes:      Conjunctiva/sclera: Conjunctivae normal.      Pupils: Pupils are equal, round, and reactive to light.   Neck:      Thyroid: No thyromegaly.      Vascular: No JVD.   Cardiovascular:      Rate and Rhythm: Normal rate and regular rhythm.      Heart sounds: Normal heart sounds. No murmur heard.  Pulmonary:      Effort: Pulmonary effort is normal. No respiratory distress.      Breath sounds: Normal breath sounds. No wheezing or rhonchi.   Abdominal:      General: Bowel sounds are normal. There is no distension.      Palpations: Abdomen is soft.      Tenderness: There is no abdominal tenderness.   Musculoskeletal:         General: No swelling or tenderness. Normal range of motion.      Cervical back: Normal range of motion and neck supple. No tenderness.   Lymphadenopathy:      Cervical: No cervical adenopathy.   Skin:     General: Skin is warm and dry.      Coloration: Skin is not jaundiced.      Findings: No erythema or rash.   Neurological:      General: No focal deficit present.      Mental Status: He is alert and oriented to person, place, and time. Mental status is at baseline.      Sensory: No sensory deficit.      Motor: No weakness.      Gait: Gait normal.   Psychiatric:         Mood and Affect: Mood normal.         Behavior: Behavior normal.         Thought Content: Thought content normal.         Judgment: Judgment normal.          Result Review :     No visits with results within 7 Day(s) from this visit.   Latest known visit with results is:   Orders Only on " 10/31/2024   Component Date Value Ref Range Status    Total Cholesterol 11/07/2024 187  0 - 200 mg/dL Final    Triglycerides 11/07/2024 297 (H)  0 - 150 mg/dL Final    HDL Cholesterol 11/07/2024 31 (L)  40 - 60 mg/dL Final    LDL Cholesterol  11/07/2024 105 (H)  0 - 100 mg/dL Final    VLDL Cholesterol 11/07/2024 51 (H)  5 - 40 mg/dL Final    LDL/HDL Ratio 11/07/2024 3.12   Final    Glucose 11/07/2024 87  65 - 99 mg/dL Final    BUN 11/07/2024 20  8 - 23 mg/dL Final    Creatinine 11/07/2024 1.27  0.76 - 1.27 mg/dL Final    Sodium 11/07/2024 138  136 - 145 mmol/L Final    Potassium 11/07/2024 4.1  3.5 - 5.2 mmol/L Final    Chloride 11/07/2024 105  98 - 107 mmol/L Final    CO2 11/07/2024 24.9  22.0 - 29.0 mmol/L Final    Calcium 11/07/2024 9.2  8.6 - 10.5 mg/dL Final    Total Protein 11/07/2024 6.5  6.0 - 8.5 g/dL Final    Albumin 11/07/2024 3.9  3.5 - 5.2 g/dL Final    ALT (SGPT) 11/07/2024 27  1 - 41 U/L Final    AST (SGOT) 11/07/2024 27  1 - 40 U/L Final    Alkaline Phosphatase 11/07/2024 64  39 - 117 U/L Final    Total Bilirubin 11/07/2024 0.3  0.0 - 1.2 mg/dL Final    Globulin 11/07/2024 2.6  gm/dL Final    A/G Ratio 11/07/2024 1.5  g/dL Final    BUN/Creatinine Ratio 11/07/2024 15.7  7.0 - 25.0 Final    Anion Gap 11/07/2024 8.1  5.0 - 15.0 mmol/L Final    eGFR 11/07/2024 62.3  >60.0 mL/min/1.73 Final    WBC 11/07/2024 5.77  3.40 - 10.80 10*3/mm3 Final    RBC 11/07/2024 5.29  4.14 - 5.80 10*6/mm3 Final    Hemoglobin 11/07/2024 15.2  13.0 - 17.7 g/dL Final    Hematocrit 11/07/2024 47.7  37.5 - 51.0 % Final    MCV 11/07/2024 90.2  79.0 - 97.0 fL Final    MCH 11/07/2024 28.7  26.6 - 33.0 pg Final    MCHC 11/07/2024 31.9  31.5 - 35.7 g/dL Final    RDW 11/07/2024 12.1 (L)  12.3 - 15.4 % Final    RDW-SD 11/07/2024 39.9  37.0 - 54.0 fl Final    MPV 11/07/2024 10.6  6.0 - 12.0 fL Final    Platelets 11/07/2024 213  140 - 450 10*3/mm3 Final    TSH 11/07/2024 3.430  0.270 - 4.200 uIU/mL Final    Testosterone, Total 11/07/2024  126.00 (L)  193.00 - 740.00 ng/dL Final    PSA 11/07/2024 1.680  0.000 - 4.000 ng/mL Final                              Assessment and Plan    Diagnoses and all orders for this visit:    1. Ear fullness, left (Primary)  Comments:  resume astelin nasal spray and zyrtec    2. Seasonal allergic rhinitis due to pollen  Comments:  resume astelin, try zyrtec or claritin otc  Orders:  -     Azelastine HCl 137 MCG/SPRAY solution; 2 sprays by Each Nare route 2 (Two) Times a Day.  Dispense: 30 mL; Refill: 1    3. Primary hypertension  Comments:  controlled today, pt stopped hydralazine.  Will continue lisinopril and clonidine for now  Patient to call if SBP consistently >140 systolic    4. Bilateral hearing loss, unspecified hearing loss type  -     Ambulatory Referral to Audiology    5. Acute cough  Comments:  ? if r/t allergy/postnasal drip, less likely lisinopril. May consider change lisinopril if no improvment with allergy treatment.    6. Fatigue, unspecified type  Comments:  rec trial of B12 otc daily    7. Mixed hyperlipidemia  Comments:  Reviewed previous labs with patient, rec resume aspirin and atorvastatin    8. Other insomnia  Comments:  Patient has hydroxyzine to use for this, recommend he try it            I spent 40 minutes caring for Jaron Espinoza on this date of service. This time includes time spent by me in the following activities: preparing for the visit, reviewing tests, performing a medically appropriate examination and/or evaluation , counseling and educating the patient/family/caregiver, ordering medications, tests, or procedures and documenting information in the medical record        Follow Up     Return for Next scheduled follow up in May or earlier if needed .  Patient was given instructions and counseling regarding his condition or for health maintenance advice. Please see specific information pulled into the AVS if appropriate.        Part of this note may be an electronic  transcription/translation of spoken language to printed text using the Dragon Dictation System

## 2025-03-07 ENCOUNTER — OFFICE VISIT (OUTPATIENT)
Dept: FAMILY MEDICINE CLINIC | Facility: CLINIC | Age: 65
End: 2025-03-07
Payer: MEDICARE

## 2025-03-07 VITALS
SYSTOLIC BLOOD PRESSURE: 132 MMHG | HEIGHT: 72 IN | BODY MASS INDEX: 26.95 KG/M2 | HEART RATE: 57 BPM | WEIGHT: 199 LBS | OXYGEN SATURATION: 98 % | DIASTOLIC BLOOD PRESSURE: 80 MMHG

## 2025-03-07 DIAGNOSIS — J30.1 SEASONAL ALLERGIC RHINITIS DUE TO POLLEN: ICD-10-CM

## 2025-03-07 DIAGNOSIS — H61.21 IMPACTED CERUMEN, RIGHT EAR: Primary | ICD-10-CM

## 2025-03-07 PROCEDURE — 99213 OFFICE O/P EST LOW 20 MIN: CPT | Performed by: NURSE PRACTITIONER

## 2025-03-07 PROCEDURE — 1160F RVW MEDS BY RX/DR IN RCRD: CPT | Performed by: NURSE PRACTITIONER

## 2025-03-07 PROCEDURE — 1159F MED LIST DOCD IN RCRD: CPT | Performed by: NURSE PRACTITIONER

## 2025-03-07 PROCEDURE — 1126F AMNT PAIN NOTED NONE PRSNT: CPT | Performed by: NURSE PRACTITIONER

## 2025-03-07 PROCEDURE — 3079F DIAST BP 80-89 MM HG: CPT | Performed by: NURSE PRACTITIONER

## 2025-03-07 PROCEDURE — 3075F SYST BP GE 130 - 139MM HG: CPT | Performed by: NURSE PRACTITIONER

## 2025-03-07 NOTE — PROGRESS NOTES
"Chief Complaint  Earache (Right ear crunching sound )    Subjective        Jaron Espinoza presents to [facility]  History of Present Illness    Patient presents with c/o crunching sound to his right ear without pain, discharge or hearing loss. Sx started a few weeks ago. Patient seen by TOMAS Colindres on 3/3 - advised to restart Astelin, Zyrtec or Claritin for ear fullness and cough. Zyrtec has been started, not Astelin.     Objective   Vital Signs:  /80 (BP Location: Left arm, Patient Position: Sitting, Cuff Size: Adult)   Pulse 57   Ht 182.9 cm (72\")   Wt 90.3 kg (199 lb)   SpO2 98%   BMI 26.99 kg/m²   Estimated body mass index is 26.99 kg/m² as calculated from the following:    Height as of this encounter: 182.9 cm (72\").    Weight as of this encounter: 90.3 kg (199 lb).        Physical Exam  Constitutional:       Appearance: Normal appearance.   HENT:      Head: Normocephalic.      Right Ear: There is impacted cerumen.      Left Ear: Tympanic membrane normal.   Cardiovascular:      Rate and Rhythm: Normal rate and regular rhythm.   Pulmonary:      Effort: Pulmonary effort is normal.      Breath sounds: Normal breath sounds.   Abdominal:      General: Abdomen is flat. Bowel sounds are normal.      Palpations: Abdomen is soft.   Musculoskeletal:         General: Normal range of motion.      Cervical back: Neck supple.      Right lower leg: No edema.      Left lower leg: No edema.   Skin:     General: Skin is warm and dry.   Neurological:      Mental Status: He is alert and oriented to person, place, and time.      Gait: Gait is intact.   Psychiatric:         Attention and Perception: Attention normal.         Mood and Affect: Mood normal.         Speech: Speech normal.        Result Review :                Assessment and Plan   Diagnoses and all orders for this visit:    1. Impacted cerumen, right ear (Primary)  Comments:  1. Irrigation ordered    2. Seasonal allergic " rhinitis due to pollen  Comments:  1. Recommended patient start Astelin as prescribed  2. Continue Zyrtec  3. Call if sx persist           I spent 20 minutes caring for Jaron on this date of service. This time includes time spent by me in the following activities:preparing for the visit, obtaining and/or reviewing a separately obtained history, performing a medically appropriate examination and/or evaluation , counseling and educating the patient/family/caregiver, ordering medications, tests, or procedures, documenting information in the medical record, and care coordination  Follow Up   Return if symptoms worsen or fail to improve.  Patient was given instructions and counseling regarding his condition or for health maintenance advice. Please see specific information pulled into the AVS if appropriate.

## 2025-03-11 DIAGNOSIS — I10 PRIMARY HYPERTENSION: ICD-10-CM

## 2025-03-11 RX ORDER — CLONIDINE HYDROCHLORIDE 0.1 MG/1
TABLET ORAL
Qty: 90 TABLET | Refills: 1 | Status: SHIPPED | OUTPATIENT
Start: 2025-03-11

## 2025-03-17 ENCOUNTER — OFFICE VISIT (OUTPATIENT)
Dept: FAMILY MEDICINE CLINIC | Facility: CLINIC | Age: 65
End: 2025-03-17
Payer: MEDICARE

## 2025-03-17 VITALS
SYSTOLIC BLOOD PRESSURE: 142 MMHG | HEIGHT: 72 IN | WEIGHT: 193 LBS | HEART RATE: 80 BPM | BODY MASS INDEX: 26.14 KG/M2 | OXYGEN SATURATION: 96 % | DIASTOLIC BLOOD PRESSURE: 80 MMHG

## 2025-03-17 DIAGNOSIS — M54.42 ACUTE LEFT-SIDED LOW BACK PAIN WITH LEFT-SIDED SCIATICA: Primary | ICD-10-CM

## 2025-03-17 RX ORDER — METHYLPREDNISOLONE 4 MG/1
TABLET ORAL
Qty: 21 TABLET | Refills: 0 | Status: SHIPPED | OUTPATIENT
Start: 2025-03-17

## 2025-03-17 RX ORDER — BACLOFEN 10 MG/1
10 TABLET ORAL NIGHTLY PRN
Qty: 30 TABLET | Refills: 0 | Status: SHIPPED | OUTPATIENT
Start: 2025-03-17

## 2025-03-17 RX ADMIN — TESTOSTERONE CYPIONATE 200 MG: 200 INJECTION, SOLUTION INTRAMUSCULAR at 16:04

## 2025-03-17 NOTE — PROGRESS NOTES
"Chief Complaint  Hip Pain (Lower back pain on the left side that radiates down the leg started 10 days ago )    Subjective        Jaron Espinoza presents to [facility]  History of Present Illness    Patient presents with complaints of left-sided back pain that radiates down the front of his left leg down to knee. He denies any trauma or injury. Patient describes as a shooting pain - occurred a few times over the last few months.     Objective   Vital Signs:  /80 (BP Location: Left arm, Patient Position: Sitting, Cuff Size: Adult)   Pulse 80   Ht 182.9 cm (72\")   Wt 87.5 kg (193 lb)   SpO2 96%   BMI 26.18 kg/m²   Estimated body mass index is 26.18 kg/m² as calculated from the following:    Height as of this encounter: 182.9 cm (72\").    Weight as of this encounter: 87.5 kg (193 lb).          Physical Exam  Constitutional:       Appearance: Normal appearance.   HENT:      Head: Normocephalic.   Cardiovascular:      Rate and Rhythm: Normal rate and regular rhythm.   Pulmonary:      Effort: Pulmonary effort is normal.      Breath sounds: Normal breath sounds.   Abdominal:      General: Abdomen is flat. Bowel sounds are normal.      Palpations: Abdomen is soft.   Musculoskeletal:         General: Normal range of motion.      Cervical back: Neck supple.        Back:       Right lower leg: No edema.      Left lower leg: No edema.        Legs:    Skin:     General: Skin is warm and dry.   Neurological:      Mental Status: He is alert and oriented to person, place, and time.      Gait: Gait is intact.   Psychiatric:         Attention and Perception: Attention normal.         Mood and Affect: Mood normal.         Speech: Speech normal.        Result Review :                Assessment and Plan   Diagnoses and all orders for this visit:    1. Acute left-sided low back pain with left-sided sciatica (Primary)  -     XR Spine Lumbar 4+ View; Future    Other orders  -     " methylPREDNISolone (MEDROL) 4 MG dose pack; Take as directed on package instructions.  Dispense: 21 tablet; Refill: 0  -     baclofen (LIORESAL) 10 MG tablet; Take 1 tablet by mouth At Night As Needed for Muscle Spasms.  Dispense: 30 tablet; Refill: 0    Pain is consistent with sciatica.  Medrol and baclofen ordered.  Exercises provided to patient on printout, recommended stretching.  If pain persist, proceed with L-spine x-ray.  Call with new or worsening concerns       I spent 22 minutes caring for Jaron on this date of service. This time includes time spent by me in the following activities:preparing for the visit, obtaining and/or reviewing a separately obtained history, performing a medically appropriate examination and/or evaluation , counseling and educating the patient/family/caregiver, ordering medications, tests, or procedures, documenting information in the medical record, and care coordination  Follow Up   Return if symptoms worsen or fail to improve.  Patient was given instructions and counseling regarding his condition or for health maintenance advice. Please see specific information pulled into the AVS if appropriate.

## 2025-03-17 NOTE — PROGRESS NOTES
Injection  Injection performed in the left ventrogluteal by Janny Goodman MA. Patient tolerated the procedure well without complications.  03/17/25   Janny Goodman MA

## 2025-04-21 ENCOUNTER — OFFICE VISIT (OUTPATIENT)
Dept: FAMILY MEDICINE CLINIC | Facility: CLINIC | Age: 65
End: 2025-04-21
Payer: MEDICARE

## 2025-04-21 VITALS
SYSTOLIC BLOOD PRESSURE: 122 MMHG | HEIGHT: 72 IN | BODY MASS INDEX: 25.47 KG/M2 | WEIGHT: 188 LBS | OXYGEN SATURATION: 98 % | DIASTOLIC BLOOD PRESSURE: 78 MMHG | HEART RATE: 66 BPM

## 2025-04-21 DIAGNOSIS — M25.562 ACUTE PAIN OF LEFT KNEE: ICD-10-CM

## 2025-04-21 DIAGNOSIS — M54.42 ACUTE LEFT-SIDED LOW BACK PAIN WITH LEFT-SIDED SCIATICA: Primary | ICD-10-CM

## 2025-04-21 DIAGNOSIS — E29.1 HYPOGONADISM IN MALE: ICD-10-CM

## 2025-04-21 PROCEDURE — 3074F SYST BP LT 130 MM HG: CPT

## 2025-04-21 PROCEDURE — 1125F AMNT PAIN NOTED PAIN PRSNT: CPT

## 2025-04-21 PROCEDURE — 3078F DIAST BP <80 MM HG: CPT

## 2025-04-21 PROCEDURE — 1160F RVW MEDS BY RX/DR IN RCRD: CPT

## 2025-04-21 PROCEDURE — 1159F MED LIST DOCD IN RCRD: CPT

## 2025-04-21 PROCEDURE — 99213 OFFICE O/P EST LOW 20 MIN: CPT

## 2025-04-21 PROCEDURE — 96372 THER/PROPH/DIAG INJ SC/IM: CPT

## 2025-04-21 RX ORDER — BACLOFEN 10 MG/1
10 TABLET ORAL NIGHTLY PRN
Qty: 30 TABLET | Refills: 0 | Status: SHIPPED | OUTPATIENT
Start: 2025-04-21

## 2025-04-21 RX ORDER — TESTOSTERONE CYPIONATE 200 MG/ML
200 INJECTION, SOLUTION INTRAMUSCULAR
Qty: 6 ML | Refills: 1 | Status: SHIPPED | OUTPATIENT
Start: 2025-04-21

## 2025-04-21 RX ADMIN — TESTOSTERONE CYPIONATE 200 MG: 200 INJECTION, SOLUTION INTRAMUSCULAR at 13:57

## 2025-04-21 NOTE — PROGRESS NOTES
Injection  Injection performed in the right ventrogluteal by Janny Goodman MA. Patient tolerated the procedure well without complications.  04/21/25   Janny Goodman MA

## 2025-04-21 NOTE — PROGRESS NOTES
"Chief Complaint  Leg Pain (Left sided leg pain that has gotten worse )    Subjective        Jaron Espinoza presents to [facility]  History of Present Illness    Patient presented on March 17 with complaints of left-sided back pain that radiates down the front of his left leg down to the knee.  Patient denied any trauma or injury but described a shooting pain, occurring a few times over the last few months.  Patient was treated with Medrol and baclofen, seemed to get relief with muscle relaxant.    X-ray of L-spine was ordered but not completed. Patient also c/o today that he is having left knee pain.     Objective   Vital Signs:  /78 (BP Location: Left arm, Patient Position: Sitting, Cuff Size: Adult)   Pulse 66   Ht 182.9 cm (72\")   Wt 85.3 kg (188 lb)   SpO2 98%   BMI 25.50 kg/m²   Estimated body mass index is 25.5 kg/m² as calculated from the following:    Height as of this encounter: 182.9 cm (72\").    Weight as of this encounter: 85.3 kg (188 lb).         Physical Exam  Constitutional:       Appearance: Normal appearance.   HENT:      Head: Normocephalic.   Cardiovascular:      Rate and Rhythm: Normal rate and regular rhythm.   Pulmonary:      Effort: Pulmonary effort is normal.      Breath sounds: Normal breath sounds.   Abdominal:      General: Abdomen is flat. Bowel sounds are normal.      Palpations: Abdomen is soft.   Musculoskeletal:         General: Normal range of motion.      Cervical back: Neck supple.      Right lower leg: No edema.      Left lower leg: No edema.   Skin:     General: Skin is warm and dry.   Neurological:      Mental Status: He is alert and oriented to person, place, and time.      Gait: Gait is intact.   Psychiatric:         Attention and Perception: Attention normal.         Mood and Affect: Mood normal.         Speech: Speech normal.        Result Review :                Assessment and Plan   Diagnoses and all orders for this " visit:    1. Acute left-sided low back pain with left-sided sciatica (Primary)    2. Acute pain of left knee  -     XR Knee 3 View Left; Future    Other orders  -     baclofen (LIORESAL) 10 MG tablet; Take 1 tablet by mouth At Night As Needed for Muscle Spasms.  Dispense: 30 tablet; Refill: 0    Refill Baclofen  Patient to get x-rays today as ordered, will notify of results when available  Start Diclofenac BID PRN       I spent 25 minutes caring for Jaron on this date of service. This time includes time spent by me in the following activities:preparing for the visit, reviewing tests, obtaining and/or reviewing a separately obtained history, performing a medically appropriate examination and/or evaluation , counseling and educating the patient/family/caregiver, ordering medications, tests, or procedures, documenting information in the medical record, and care coordination  Follow Up   Return for Next scheduled follow up.  Patient was given instructions and counseling regarding his condition or for health maintenance advice. Please see specific information pulled into the AVS if appropriate.

## 2025-04-24 ENCOUNTER — RESULTS FOLLOW-UP (OUTPATIENT)
Dept: FAMILY MEDICINE CLINIC | Facility: CLINIC | Age: 65
End: 2025-04-24
Payer: MEDICARE

## 2025-04-24 DIAGNOSIS — M54.42 ACUTE LEFT-SIDED LOW BACK PAIN WITH LEFT-SIDED SCIATICA: Primary | ICD-10-CM

## 2025-04-24 DIAGNOSIS — M51.362 DEGENERATION OF INTERVERTEBRAL DISC OF LUMBAR REGION WITH DISCOGENIC BACK PAIN AND LOWER EXTREMITY PAIN: ICD-10-CM

## 2025-04-24 NOTE — PROGRESS NOTES
The knee x-ray is normal but patient's lumbar x-ray does show evidence of degenerative disc.  I would recommend physical therapy if patient is agreeable

## 2025-05-01 DIAGNOSIS — E78.2 MIXED HYPERLIPIDEMIA: ICD-10-CM

## 2025-05-01 DIAGNOSIS — I10 PRIMARY HYPERTENSION: Primary | ICD-10-CM

## 2025-05-01 DIAGNOSIS — E29.1 HYPOGONADISM IN MALE: ICD-10-CM

## 2025-05-01 DIAGNOSIS — I10 PRIMARY HYPERTENSION: ICD-10-CM

## 2025-05-01 RX ORDER — CLONIDINE HYDROCHLORIDE 0.1 MG/1
TABLET ORAL
Qty: 90 TABLET | Refills: 1 | Status: SHIPPED | OUTPATIENT
Start: 2025-05-01

## 2025-05-07 ENCOUNTER — LAB (OUTPATIENT)
Dept: FAMILY MEDICINE CLINIC | Facility: CLINIC | Age: 65
End: 2025-05-07
Payer: MEDICARE

## 2025-05-07 ENCOUNTER — CLINICAL SUPPORT (OUTPATIENT)
Dept: FAMILY MEDICINE CLINIC | Facility: CLINIC | Age: 65
End: 2025-05-07
Payer: MEDICARE

## 2025-05-07 DIAGNOSIS — F43.0 STRESS REACTION: ICD-10-CM

## 2025-05-07 DIAGNOSIS — E29.1 HYPOGONADISM, MALE: Primary | ICD-10-CM

## 2025-05-07 PROCEDURE — 84443 ASSAY THYROID STIM HORMONE: CPT | Performed by: NURSE PRACTITIONER

## 2025-05-07 PROCEDURE — 84403 ASSAY OF TOTAL TESTOSTERONE: CPT | Performed by: NURSE PRACTITIONER

## 2025-05-07 PROCEDURE — 85027 COMPLETE CBC AUTOMATED: CPT | Performed by: NURSE PRACTITIONER

## 2025-05-07 PROCEDURE — 80053 COMPREHEN METABOLIC PANEL: CPT | Performed by: NURSE PRACTITIONER

## 2025-05-07 PROCEDURE — 36415 COLL VENOUS BLD VENIPUNCTURE: CPT | Performed by: NURSE PRACTITIONER

## 2025-05-07 PROCEDURE — 80061 LIPID PANEL: CPT | Performed by: NURSE PRACTITIONER

## 2025-05-07 RX ORDER — ESCITALOPRAM OXALATE 5 MG/1
5 TABLET ORAL DAILY
Qty: 90 TABLET | Refills: 1 | OUTPATIENT
Start: 2025-05-07

## 2025-05-07 RX ADMIN — TESTOSTERONE CYPIONATE 200 MG: 200 INJECTION, SOLUTION INTRAMUSCULAR at 14:09

## 2025-05-07 NOTE — TELEPHONE ENCOUNTER
Please d/c from pt chart   Finasteride Pregnancy And Lactation Text: This medication is absolutely contraindicated during pregnancy. It is unknown if it is excreted in breast milk.

## 2025-05-07 NOTE — PROGRESS NOTES
Injection  Injection performed in the left ventrogluteal by Janny Goodman MA. Patient tolerated the procedure well without complications.  05/07/25   Janny Goodman MA

## 2025-05-08 ENCOUNTER — TELEPHONE (OUTPATIENT)
Dept: FAMILY MEDICINE CLINIC | Facility: CLINIC | Age: 65
End: 2025-05-08

## 2025-05-08 LAB
ALBUMIN SERPL-MCNC: 4.3 G/DL (ref 3.5–5.2)
ALBUMIN/GLOB SERPL: 1.6 G/DL
ALP SERPL-CCNC: 60 U/L (ref 39–117)
ALT SERPL W P-5'-P-CCNC: 22 U/L (ref 1–41)
ANION GAP SERPL CALCULATED.3IONS-SCNC: 10.4 MMOL/L (ref 5–15)
AST SERPL-CCNC: 25 U/L (ref 1–40)
BILIRUB SERPL-MCNC: 0.6 MG/DL (ref 0–1.2)
BUN SERPL-MCNC: 19 MG/DL (ref 8–23)
BUN/CREAT SERPL: 15.6 (ref 7–25)
CALCIUM SPEC-SCNC: 9.6 MG/DL (ref 8.6–10.5)
CHLORIDE SERPL-SCNC: 106 MMOL/L (ref 98–107)
CHOLEST SERPL-MCNC: 222 MG/DL (ref 0–200)
CO2 SERPL-SCNC: 24.6 MMOL/L (ref 22–29)
CREAT SERPL-MCNC: 1.22 MG/DL (ref 0.76–1.27)
DEPRECATED RDW RBC AUTO: 39.1 FL (ref 37–54)
EGFRCR SERPLBLD CKD-EPI 2021: 65.4 ML/MIN/1.73
ERYTHROCYTE [DISTWIDTH] IN BLOOD BY AUTOMATED COUNT: 12.5 % (ref 12.3–15.4)
GLOBULIN UR ELPH-MCNC: 2.7 GM/DL
GLUCOSE SERPL-MCNC: 101 MG/DL (ref 65–99)
HCT VFR BLD AUTO: 46.3 % (ref 37.5–51)
HDLC SERPL-MCNC: 37 MG/DL (ref 40–60)
HGB BLD-MCNC: 15.5 G/DL (ref 13–17.7)
LDLC SERPL CALC-MCNC: 146 MG/DL (ref 0–100)
LDLC/HDLC SERPL: 3.84 {RATIO}
MCH RBC QN AUTO: 29.2 PG (ref 26.6–33)
MCHC RBC AUTO-ENTMCNC: 33.5 G/DL (ref 31.5–35.7)
MCV RBC AUTO: 87.2 FL (ref 79–97)
PLATELET # BLD AUTO: 250 10*3/MM3 (ref 140–450)
PMV BLD AUTO: 10.6 FL (ref 6–12)
POTASSIUM SERPL-SCNC: 4.1 MMOL/L (ref 3.5–5.2)
PROT SERPL-MCNC: 7 G/DL (ref 6–8.5)
RBC # BLD AUTO: 5.31 10*6/MM3 (ref 4.14–5.8)
SODIUM SERPL-SCNC: 141 MMOL/L (ref 136–145)
TESTOST SERPL-MCNC: 82.7 NG/DL (ref 193–740)
TRIGL SERPL-MCNC: 214 MG/DL (ref 0–150)
TSH SERPL DL<=0.05 MIU/L-ACNC: 3.62 UIU/ML (ref 0.27–4.2)
VLDLC SERPL-MCNC: 39 MG/DL (ref 5–40)
WBC NRBC COR # BLD AUTO: 5.35 10*3/MM3 (ref 3.4–10.8)

## 2025-05-08 RX ORDER — GABAPENTIN 100 MG/1
100-200 CAPSULE ORAL NIGHTLY PRN
Qty: 60 CAPSULE | Refills: 0 | Status: SHIPPED | OUTPATIENT
Start: 2025-05-08 | End: 2025-05-10 | Stop reason: SINTOL

## 2025-05-08 NOTE — TELEPHONE ENCOUNTER
Caller: Jaron Espinoza    Relationship: Self    Best call back number: [phone]    What medication are you requesting: SOMETHING STRONGER FOR THE PAIN AND TO HELP HIM SLEEP , THE PAIN WAKES HIM UP     What are your current symptoms: SEVERE PAIN AT NIGHT IN LEFT KNEE AND LEG,    MORE PAIN AT NIGHT AFTER HE GETS OFF OF HIS LEG. STATED HE HAS TROUBLE WALKING THEN     Have you had these symptoms before:    [x] Yes  [] No    Have you been treated for these symptoms before:   [x] Yes  [] No    If a prescription is needed, what is your preferred pharmacy and phone number: [facility] [id number] - [place], [place] - [address] - [phone] [facility] [phone]      Additional notes:    PLEASE ADVISE WHEN ANYTHING HAS BEEN DONE

## 2025-05-09 ENCOUNTER — RESULTS FOLLOW-UP (OUTPATIENT)
Dept: FAMILY MEDICINE CLINIC | Facility: CLINIC | Age: 65
End: 2025-05-09
Payer: MEDICARE

## 2025-05-09 ENCOUNTER — TELEPHONE (OUTPATIENT)
Dept: FAMILY MEDICINE CLINIC | Facility: CLINIC | Age: 65
End: 2025-05-09
Payer: MEDICARE

## 2025-05-09 NOTE — TELEPHONE ENCOUNTER
Pt stopped in office r/t to Gabapentin that was sent to pharmacy on pt. He states he cannot take medication, causes dizziness and gets sick. Pt wanting to know if provider could call in another strong pain medication instead. Pt is in a lot of pain

## 2025-05-10 RX ORDER — CELECOXIB 200 MG/1
200 CAPSULE ORAL 2 TIMES DAILY PRN
Qty: 30 CAPSULE | Refills: 0 | Status: SHIPPED | OUTPATIENT
Start: 2025-05-10

## 2025-05-14 ENCOUNTER — OFFICE VISIT (OUTPATIENT)
Dept: FAMILY MEDICINE CLINIC | Facility: CLINIC | Age: 65
End: 2025-05-14
Payer: MEDICARE

## 2025-05-14 VITALS
DIASTOLIC BLOOD PRESSURE: 80 MMHG | TEMPERATURE: 98.1 F | HEART RATE: 61 BPM | WEIGHT: 191 LBS | SYSTOLIC BLOOD PRESSURE: 138 MMHG | HEIGHT: 72 IN | OXYGEN SATURATION: 98 % | BODY MASS INDEX: 25.87 KG/M2

## 2025-05-14 DIAGNOSIS — I10 PRIMARY HYPERTENSION: ICD-10-CM

## 2025-05-14 DIAGNOSIS — M54.42 ACUTE LEFT-SIDED LOW BACK PAIN WITH LEFT-SIDED SCIATICA: ICD-10-CM

## 2025-05-14 DIAGNOSIS — F43.0 STRESS REACTION: ICD-10-CM

## 2025-05-14 DIAGNOSIS — E29.1 HYPOGONADISM, MALE: Primary | ICD-10-CM

## 2025-05-14 DIAGNOSIS — R05.2 SUBACUTE COUGH: ICD-10-CM

## 2025-05-14 DIAGNOSIS — E78.2 MIXED HYPERLIPIDEMIA: ICD-10-CM

## 2025-05-14 DIAGNOSIS — M25.562 ACUTE PAIN OF LEFT KNEE: ICD-10-CM

## 2025-05-14 DIAGNOSIS — K21.9 GASTROESOPHAGEAL REFLUX DISEASE WITHOUT ESOPHAGITIS: ICD-10-CM

## 2025-05-14 RX ORDER — LOSARTAN POTASSIUM 100 MG/1
100 TABLET ORAL DAILY
Qty: 90 TABLET | Refills: 1 | Status: SHIPPED | OUTPATIENT
Start: 2025-05-14

## 2025-05-14 RX ORDER — ATORVASTATIN CALCIUM 10 MG/1
10 TABLET, FILM COATED ORAL NIGHTLY
Qty: 90 TABLET | Refills: 1 | Status: SHIPPED | OUTPATIENT
Start: 2025-05-14

## 2025-05-14 NOTE — PROGRESS NOTES
Chief Complaint  Chief Complaint   Patient presents with    Follow-up     6 month  Labs 5/7/25  Pt c/o dry cough, worse at night x 2-3 months  Pain/tenderness in posterior left knee x 2-3 weeks         Subjective          Jaron Espinoza presents to [facility] for   History of Present Illness    HTN, blood pressure is elevated, he is taking lisinopril daily as directed, has been on it since 2018, he complains of a cough at night for the last 2 to 3 months. Denies chest pain, headache, shortness of air, palpitations and swelling of extremities.     Hypogonadism, patient is on testosterone 200 mg IM every 14 days.  Labs were obtained prior to injection and had not had an injection since February.    Left knee pain, reports there is a tender area on the medial thigh just above the knee, he is taking Celebrex twice daily as needed.  X-ray of left knee is normal, lumbar spine x-ray shows degenerative disc, he was recommended to start physical therapy and is starting with [facility] and missed appt yesterday-he is planning to reschedule. Starting accupuncture in August.     GERD, taking omeprazole as needed stable on medication, denies nausea, vomiting, constipation, abdominal pain and diarrhea.    Vitamin D deficiency, taking vitamin D weekly    Hyperlipidemia, he stopped statin for unknown reason, denies side effects, muscle aches, constipation, diarrhea, GI upset, fatigue, chest pain/pressure, exercise intolerance, dyspnea, palpitations, syncope and pedal edema.        The following portions of the patient's history were reviewed and updated as appropriate: allergies, current medications, past family history, past medical history, past social history, past surgical history and problem list.    Past Medical History:   Diagnosis Date    Headache     Hx of hypogonadism     Hyperlipidemia     Hypertension      Past Surgical History:   Procedure Laterality Date    EYELID RETRACTION REPAIR    "    Family History   Problem Relation Age of Onset    Colon cancer Mother      Social History     Tobacco Use    Smoking status: Former     Current packs/day: 0.00     Average packs/day: 0.5 packs/day for 15.0 years (7.5 ttl pk-yrs)     Types: Cigarettes     Start date:      Quit date:      Years since quittin.4     Passive exposure: Past    Smokeless tobacco: Never    Tobacco comments:     quit x30 years   Substance Use Topics    Alcohol use: No       Current Outpatient Medications:     aspirin 81 MG chewable tablet, Chew 1 tablet Daily., Disp: 30 tablet, Rfl: 0    atorvastatin (LIPITOR) 10 MG tablet, Take 1 tablet by mouth Every Night., Disp: 90 tablet, Rfl: 1    Azelastine HCl 137 MCG/SPRAY solution, 2 sprays by Each Nare route 2 (Two) Times a Day., Disp: 30 mL, Rfl: 1    baclofen (LIORESAL) 10 MG tablet, Take 1 tablet by mouth At Night As Needed for Muscle Spasms., Disp: 30 tablet, Rfl: 0    celecoxib (CeleBREX) 200 MG capsule, Take 1 capsule by mouth 2 (Two) Times a Day As Needed for Moderate Pain., Disp: 30 capsule, Rfl: 0    clobetasol propionate (TEMOVATE) 0.05 % cream, , Disp: , Rfl:     cloNIDine (CATAPRES) 0.1 MG tablet, TAKE 1 TABLET BY MOUTH EVERY 6 HOUR AS NEEDED FOR HIGH BLOOD PRESSURE, Disp: 90 tablet, Rfl: 1    hydrOXYzine (ATARAX) 10 MG tablet, Take 1-2 tablets by mouth Every 6 (Six) Hours As Needed for Itching (insomnia)., Disp: 60 tablet, Rfl: 0    omeprazole (priLOSEC) 40 MG capsule, TAKE 1 CAPSULE BY MOUTH DAILY, Disp: 90 capsule, Rfl: 1    Syringe, Disposable, 3 ML misc, Use 1 mL Every 14 (Fourteen) Days., Disp: 2 each, Rfl: 4    Syringe/Needle, Disp, (B-D INTEGRA SYRINGE) 23G X 1\" 3 ML misc, USE TO INJECT TESTOSTERONE ONCE EVERY 14 DAYS, Disp: 2 each, Rfl: 11    Testosterone Cypionate (DEPOTESTOTERONE CYPIONATE) 200 MG/ML injection, Inject 1 mL into the appropriate muscle as directed by prescriber Every 14 (Fourteen) Days., Disp: 6 mL, Rfl: 1    vitamin D (ERGOCALCIFEROL) 1.25 MG " "(86890 UT) capsule capsule, TAKE 1 CAPSULE BY MOUTH ONCE WEEKLY, Disp: 12 capsule, Rfl: 1    losartan (Cozaar) 100 MG tablet, Take 1 tablet by mouth Daily., Disp: 90 tablet, Rfl: 1    Current Facility-Administered Medications:     Testosterone Cypionate (DEPOTESTOTERONE CYPIONATE) injection 200 mg, 200 mg, Intramuscular, Q14 Days, Rina Colindres, APRN, 200 mg at 05/07/25 1409    Objective   Vital Signs:   Vitals:    05/14/25 1341 05/14/25 1411   BP: 167/84 138/80   BP Location: Left arm Left arm   Patient Position: Sitting Sitting   Cuff Size: Adult Adult   Pulse: 61    Temp: 98.1 °F (36.7 °C)    TempSrc: Oral    SpO2: 98%    Weight: 86.6 kg (191 lb)    Height: 182.9 cm (72\")    PainSc: 4     PainLoc: Knee        Body mass index is 25.9 kg/m².    Physical Exam  Constitutional:       General: He is not in acute distress.     Appearance: Normal appearance. He is well-developed. He is not ill-appearing or diaphoretic.   HENT:      Head: Normocephalic.   Eyes:      Conjunctiva/sclera: Conjunctivae normal.      Pupils: Pupils are equal, round, and reactive to light.   Neck:      Thyroid: No thyromegaly.      Vascular: No JVD.   Cardiovascular:      Rate and Rhythm: Normal rate and regular rhythm.      Heart sounds: Normal heart sounds. No murmur heard.  Pulmonary:      Effort: Pulmonary effort is normal. No respiratory distress.      Breath sounds: Normal breath sounds. No wheezing or rhonchi.   Abdominal:      General: Bowel sounds are normal. There is no distension.      Palpations: Abdomen is soft.      Tenderness: There is no abdominal tenderness.   Musculoskeletal:         General: Tenderness (L medial just above the knee ttp, mild boland rom. no swelling or crepitus) present. No swelling. Normal range of motion.      Cervical back: Normal range of motion and neck supple. No tenderness.   Lymphadenopathy:      Cervical: No cervical adenopathy.   Skin:     General: Skin is warm and dry.      Coloration: Skin is not " jaundiced.      Findings: No erythema or rash.   Neurological:      General: No focal deficit present.      Mental Status: He is alert and oriented to person, place, and time. Mental status is at baseline.      Sensory: No sensory deficit.      Motor: No weakness.      Gait: Gait normal.   Psychiatric:         Mood and Affect: Mood normal.         Behavior: Behavior normal.         Thought Content: Thought content normal.         Judgment: Judgment normal.          Result Review :     No visits with results within 7 Day(s) from this visit.   Latest known visit with results is:   Orders Only on 05/01/2025   Component Date Value Ref Range Status    Total Cholesterol 05/07/2025 222 (H)  0 - 200 mg/dL Final    Triglycerides 05/07/2025 214 (H)  0 - 150 mg/dL Final    HDL Cholesterol 05/07/2025 37 (L)  40 - 60 mg/dL Final    LDL Cholesterol  05/07/2025 146 (H)  0 - 100 mg/dL Final    VLDL Cholesterol 05/07/2025 39  5 - 40 mg/dL Final    LDL/HDL Ratio 05/07/2025 3.84   Final    Glucose 05/07/2025 101 (H)  65 - 99 mg/dL Final    BUN 05/07/2025 19  8 - 23 mg/dL Final    Creatinine 05/07/2025 1.22  0.76 - 1.27 mg/dL Final    Sodium 05/07/2025 141  136 - 145 mmol/L Final    Potassium 05/07/2025 4.1  3.5 - 5.2 mmol/L Final    Chloride 05/07/2025 106  98 - 107 mmol/L Final    CO2 05/07/2025 24.6  22.0 - 29.0 mmol/L Final    Calcium 05/07/2025 9.6  8.6 - 10.5 mg/dL Final    Total Protein 05/07/2025 7.0  6.0 - 8.5 g/dL Final    Albumin 05/07/2025 4.3  3.5 - 5.2 g/dL Final    ALT (SGPT) 05/07/2025 22  1 - 41 U/L Final    AST (SGOT) 05/07/2025 25  1 - 40 U/L Final    Alkaline Phosphatase 05/07/2025 60  39 - 117 U/L Final    Total Bilirubin 05/07/2025 0.6  0.0 - 1.2 mg/dL Final    Globulin 05/07/2025 2.7  gm/dL Final    A/G Ratio 05/07/2025 1.6  g/dL Final    BUN/Creatinine Ratio 05/07/2025 15.6  7.0 - 25.0 Final    Anion Gap 05/07/2025 10.4  5.0 - 15.0 mmol/L Final    eGFR 05/07/2025 65.4  >60.0 mL/min/1.73 Final    WBC 05/07/2025  5.35  3.40 - 10.80 10*3/mm3 Final    RBC 05/07/2025 5.31  4.14 - 5.80 10*6/mm3 Final    Hemoglobin 05/07/2025 15.5  13.0 - 17.7 g/dL Final    Hematocrit 05/07/2025 46.3  37.5 - 51.0 % Final    MCV 05/07/2025 87.2  79.0 - 97.0 fL Final    MCH 05/07/2025 29.2  26.6 - 33.0 pg Final    MCHC 05/07/2025 33.5  31.5 - 35.7 g/dL Final    RDW 05/07/2025 12.5  12.3 - 15.4 % Final    RDW-SD 05/07/2025 39.1  37.0 - 54.0 fl Final    MPV 05/07/2025 10.6  6.0 - 12.0 fL Final    Platelets 05/07/2025 250  140 - 450 10*3/mm3 Final    TSH 05/07/2025 3.620  0.270 - 4.200 uIU/mL Final    Testosterone, Total 05/07/2025 82.70 (L)  193.00 - 740.00 ng/dL Final                              Assessment and Plan    Diagnoses and all orders for this visit:    1. Hypogonadism, male (Primary)  Comments:  resume testosterone Q2 weeks    2. Stress reaction    3. Primary hypertension  Comments:  elevated at times  has developed chronic cough, will r/o ace cough, d/c lisinopril.   start losartan  continue clonidine prn for sbp >150  Orders:  -     losartan (Cozaar) 100 MG tablet; Take 1 tablet by mouth Daily.  Dispense: 90 tablet; Refill: 1    4. Mixed hyperlipidemia  Comments:  lipids much worse  resume atorvastatin  work on HHD  Orders:  -     atorvastatin (LIPITOR) 10 MG tablet; Take 1 tablet by mouth Every Night.  Dispense: 90 tablet; Refill: 1    5. Acute pain of left knee  Comments:  likely strain/sprain  continue celebrex prn    6. Acute left-sided low back pain with left-sided sciatica  Comments:  start PT, cont celebrex    7. Gastroesophageal reflux disease without esophagitis    8. Subacute cough  Comments:  ?Ace cough vs allergy        Labs reviewed with pt    I spent 40 minutes caring for Jaron Espinoza on this date of service. This time includes time spent by me in the following activities: preparing for the visit, reviewing tests, performing a medically appropriate examination and/or evaluation , counseling and educating the  patient/family/caregiver, ordering medications, tests, or procedures and documenting information in the medical record        Follow Up     Return in about 6 months (around 11/14/2025) for Recheck. HTN panel, testosterone prior to appt. RTC on 5/21 and Q2 wks for T injections .  Patient was given instructions and counseling regarding his condition or for health maintenance advice. Please see specific information pulled into the AVS if appropriate.        Part of this note may be an electronic transcription/translation of spoken language to printed text using the Dragon Dictation System

## 2025-05-22 ENCOUNTER — CLINICAL SUPPORT (OUTPATIENT)
Dept: FAMILY MEDICINE CLINIC | Facility: CLINIC | Age: 65
End: 2025-05-22
Payer: MEDICARE

## 2025-05-22 DIAGNOSIS — E29.1 HYPOGONADISM, MALE: Primary | ICD-10-CM

## 2025-05-22 RX ADMIN — TESTOSTERONE CYPIONATE 200 MG: 200 INJECTION, SOLUTION INTRAMUSCULAR at 13:43

## 2025-05-22 NOTE — PROGRESS NOTES
Injection  Injection performed in right ventrogluteal by Megan Manjarrez MA. Patient tolerated the procedure well without complications.  05/22/25   Megan Manjarrez MA

## 2025-05-23 RX ORDER — CELECOXIB 200 MG/1
CAPSULE ORAL
Qty: 60 CAPSULE | Refills: 0 | Status: SHIPPED | OUTPATIENT
Start: 2025-05-23

## 2025-05-30 RX ORDER — HYDRALAZINE HYDROCHLORIDE 25 MG/1
25 TABLET, FILM COATED ORAL 3 TIMES DAILY
Qty: 270 TABLET | Refills: 0 | Status: SHIPPED | OUTPATIENT
Start: 2025-05-30

## 2025-06-04 ENCOUNTER — CLINICAL SUPPORT (OUTPATIENT)
Dept: FAMILY MEDICINE CLINIC | Facility: CLINIC | Age: 65
End: 2025-06-04
Payer: MEDICARE

## 2025-06-04 DIAGNOSIS — R79.89 LOW TESTOSTERONE: Primary | ICD-10-CM

## 2025-06-04 RX ADMIN — TESTOSTERONE CYPIONATE 200 MG: 200 INJECTION, SOLUTION INTRAMUSCULAR at 12:50

## 2025-06-04 NOTE — PROGRESS NOTES
Injection  Injection performed in the right ventrogluteal by Janny Goodman MA. Patient tolerated the procedure well without complications.  06/04/25   Janny Goodman MA

## 2025-06-21 DIAGNOSIS — I10 PRIMARY HYPERTENSION: ICD-10-CM

## 2025-06-23 ENCOUNTER — OFFICE VISIT (OUTPATIENT)
Dept: FAMILY MEDICINE CLINIC | Facility: CLINIC | Age: 65
End: 2025-06-23
Payer: MEDICARE

## 2025-06-23 ENCOUNTER — CLINICAL SUPPORT (OUTPATIENT)
Dept: FAMILY MEDICINE CLINIC | Facility: CLINIC | Age: 65
End: 2025-06-23
Payer: MEDICARE

## 2025-06-23 VITALS
TEMPERATURE: 97.6 F | OXYGEN SATURATION: 97 % | SYSTOLIC BLOOD PRESSURE: 158 MMHG | BODY MASS INDEX: 26.11 KG/M2 | WEIGHT: 192.8 LBS | DIASTOLIC BLOOD PRESSURE: 79 MMHG | HEART RATE: 67 BPM | HEIGHT: 72 IN

## 2025-06-23 DIAGNOSIS — E29.1 HYPOGONADISM, MALE: Primary | ICD-10-CM

## 2025-06-23 DIAGNOSIS — R35.0 URINARY FREQUENCY: Primary | ICD-10-CM

## 2025-06-23 LAB
BILIRUB BLD-MCNC: NEGATIVE MG/DL
CLARITY, POC: CLEAR
COLOR UR: YELLOW
GLUCOSE UR STRIP-MCNC: NEGATIVE MG/DL
KETONES UR QL: NEGATIVE
LEUKOCYTE EST, POC: NEGATIVE
NITRITE UR-MCNC: NEGATIVE MG/ML
PH UR: 5.5 [PH] (ref 5–8)
PROT UR STRIP-MCNC: NEGATIVE MG/DL
RBC # UR STRIP: ABNORMAL /UL
SP GR UR: 1.01 (ref 1–1.03)
UROBILINOGEN UR QL: NORMAL

## 2025-06-23 PROCEDURE — 96372 THER/PROPH/DIAG INJ SC/IM: CPT | Performed by: NURSE PRACTITIONER

## 2025-06-23 PROCEDURE — 81003 URINALYSIS AUTO W/O SCOPE: CPT | Performed by: NURSE PRACTITIONER

## 2025-06-23 PROCEDURE — 3078F DIAST BP <80 MM HG: CPT | Performed by: NURSE PRACTITIONER

## 2025-06-23 PROCEDURE — 3077F SYST BP >= 140 MM HG: CPT | Performed by: NURSE PRACTITIONER

## 2025-06-23 PROCEDURE — 1125F AMNT PAIN NOTED PAIN PRSNT: CPT | Performed by: NURSE PRACTITIONER

## 2025-06-23 PROCEDURE — 99213 OFFICE O/P EST LOW 20 MIN: CPT | Performed by: NURSE PRACTITIONER

## 2025-06-23 PROCEDURE — 1159F MED LIST DOCD IN RCRD: CPT | Performed by: NURSE PRACTITIONER

## 2025-06-23 PROCEDURE — 1160F RVW MEDS BY RX/DR IN RCRD: CPT | Performed by: NURSE PRACTITIONER

## 2025-06-23 RX ORDER — TAMSULOSIN HYDROCHLORIDE 0.4 MG/1
1 CAPSULE ORAL NIGHTLY
Qty: 90 CAPSULE | Refills: 0 | Status: SHIPPED | OUTPATIENT
Start: 2025-06-23

## 2025-06-23 RX ORDER — CLONIDINE HYDROCHLORIDE 0.1 MG/1
TABLET ORAL
Qty: 90 TABLET | Refills: 1 | Status: SHIPPED | OUTPATIENT
Start: 2025-06-23

## 2025-06-23 RX ADMIN — TESTOSTERONE CYPIONATE 200 MG: 200 INJECTION, SOLUTION INTRAMUSCULAR at 14:22

## 2025-06-23 NOTE — PROGRESS NOTES
Injection  Injection performed in the right ventrogluteal by Janny Goodman MA. Patient tolerated the procedure well without complications.  06/23/25   Janny Goodman MA

## 2025-06-23 NOTE — PROGRESS NOTES
Chief Complaint  Chief Complaint   Patient presents with    Urinary Frequency     Denies painful,            Subjective          Jaron Espinoza presents to [facility] for   History of Present Illness    Patient presents for same-day acute visit for urinary frequency, waking throughout the night to urinate x1-3, normal stream, he doesn't have to force urine out. Symptoms present for ~3mo.  He denies incontinence, dribbling, dysuria, hematuria, hesitancy. Last PSA 1.68 and down from 2.1. He is drinking more water to keep urine clear, but reports it was dark in color a few days ago    He is taking atorvastatin nightly now, reports it helps him fall sleep       The following portions of the patient's history were reviewed and updated as appropriate: allergies, current medications, past family history, past medical history, past social history, past surgical history and problem list.    Past Medical History:   Diagnosis Date    Headache     Hx of hypogonadism     Hyperlipidemia     Hypertension      Past Surgical History:   Procedure Laterality Date    EYELID RETRACTION REPAIR       Family History   Problem Relation Age of Onset    Colon cancer Mother      Social History     Tobacco Use    Smoking status: Former     Current packs/day: 0.00     Average packs/day: 0.5 packs/day for 15.0 years (7.5 ttl pk-yrs)     Types: Cigarettes     Start date:      Quit date:      Years since quittin.4     Passive exposure: Past    Smokeless tobacco: Never    Tobacco comments:     quit x30 years   Substance Use Topics    Alcohol use: No       Current Outpatient Medications:     aspirin 81 MG chewable tablet, Chew 1 tablet Daily., Disp: 30 tablet, Rfl: 0    atorvastatin (LIPITOR) 10 MG tablet, Take 1 tablet by mouth Every Night., Disp: 90 tablet, Rfl: 1    Azelastine HCl 137 MCG/SPRAY solution, 2 sprays by Each Nare route 2 (Two) Times a Day., Disp: 30 mL, Rfl: 1    baclofen (LIORESAL)  "10 MG tablet, Take 1 tablet by mouth At Night As Needed for Muscle Spasms., Disp: 30 tablet, Rfl: 0    celecoxib (CeleBREX) 200 MG capsule, TAKE 1 CAPSULE BY MOUTH 2 TIMES A DAY AS NEEDED FOR MODERATE PAIN, Disp: 60 capsule, Rfl: 0    clobetasol propionate (TEMOVATE) 0.05 % cream, , Disp: , Rfl:     cloNIDine (CATAPRES) 0.1 MG tablet, TAKE 1 TABLET BY MOUTH EVERY 6 HOURS FOR HIGH BLOOD PRESSURE, Disp: 90 tablet, Rfl: 1    hydrALAZINE (APRESOLINE) 25 MG tablet, TAKE 1 TABLET BY MOUTH 3 TIMES A DAY, Disp: 270 tablet, Rfl: 0    hydrOXYzine (ATARAX) 10 MG tablet, Take 1-2 tablets by mouth Every 6 (Six) Hours As Needed for Itching (insomnia)., Disp: 60 tablet, Rfl: 0    losartan (Cozaar) 100 MG tablet, Take 1 tablet by mouth Daily., Disp: 90 tablet, Rfl: 1    omeprazole (priLOSEC) 40 MG capsule, TAKE 1 CAPSULE BY MOUTH DAILY, Disp: 90 capsule, Rfl: 1    Syringe, Disposable, 3 ML misc, Use 1 mL Every 14 (Fourteen) Days., Disp: 2 each, Rfl: 4    Syringe/Needle, Disp, (B-D INTEGRA SYRINGE) 23G X 1\" 3 ML misc, USE TO INJECT TESTOSTERONE ONCE EVERY 14 DAYS, Disp: 2 each, Rfl: 11    Testosterone Cypionate (DEPOTESTOTERONE CYPIONATE) 200 MG/ML injection, Inject 1 mL into the appropriate muscle as directed by prescriber Every 14 (Fourteen) Days., Disp: 6 mL, Rfl: 1    vitamin D (ERGOCALCIFEROL) 1.25 MG (52815 UT) capsule capsule, TAKE 1 CAPSULE BY MOUTH ONCE WEEKLY, Disp: 12 capsule, Rfl: 1    tamsulosin (FLOMAX) 0.4 MG capsule 24 hr capsule, Take 1 capsule by mouth Every Night., Disp: 90 capsule, Rfl: 0    Current Facility-Administered Medications:     Testosterone Cypionate (DEPOTESTOTERONE CYPIONATE) injection 200 mg, 200 mg, Intramuscular, Q14 Days, Rina Colindres, APRN, 200 mg at 06/23/25 1422    Objective   Vital Signs:   Vitals:    06/23/25 1616   BP: 158/79   BP Location: Left arm   Patient Position: Sitting   Cuff Size: Adult   Pulse: 67   Temp: 97.6 °F (36.4 °C)   TempSrc: Oral   SpO2: 97%  Comment: Room air   Weight: " "87.5 kg (192 lb 12.8 oz)   Height: 182.9 cm (72\")       Body mass index is 26.15 kg/m².    Physical Exam  Constitutional:       General: He is not in acute distress.     Appearance: Normal appearance. He is not ill-appearing.   Pulmonary:      Effort: Pulmonary effort is normal.   Abdominal:      Tenderness: There is no right CVA tenderness or left CVA tenderness.   Musculoskeletal:         General: Normal range of motion.      Cervical back: Normal range of motion.   Skin:     General: Skin is warm and dry.   Neurological:      General: No focal deficit present.      Mental Status: He is alert.   Psychiatric:         Mood and Affect: Mood normal.         Behavior: Behavior normal.         Thought Content: Thought content normal.         Judgment: Judgment normal.          Result Review :     Office Visit on 06/23/2025   Component Date Value Ref Range Status    Color 06/23/2025 Yellow  Yellow, Straw, Dark Yellow, Judy Final    Clarity, UA 06/23/2025 Clear  Clear Final    Glucose, UA 06/23/2025 Negative  Negative mg/dL Final    Bilirubin 06/23/2025 Negative  Negative Final    Ketones, UA 06/23/2025 Negative  Negative Final    Specific Gravity  06/23/2025 1.015  1.005 - 1.030 Final    Blood, UA 06/23/2025 Trace (A)  Negative Final    pH, Urine 06/23/2025 5.5  5.0 - 8.0 Final    Protein, POC 06/23/2025 Negative  Negative mg/dL Final    Urobilinogen, UA 06/23/2025 Normal  Normal, 0.2 E.U./dL Final    Nitrite, UA 06/23/2025 Negative  Negative Final    Leukocytes 06/23/2025 Negative  Negative Final                              Assessment and Plan    Diagnoses and all orders for this visit:    1. Urinary frequency (Primary)  Comments:  urine dip neg with trace blood  rec water of intake of at least 2 liters/day  ?BPH, rec trial of flomax  avoid 4C's: carbonation, citrus, chocolate and caffeine  Orders:  -     POCT urinalysis dipstick, multipro    Other orders  -     tamsulosin (FLOMAX) 0.4 MG capsule 24 hr capsule; Take 1 " capsule by mouth Every Night.  Dispense: 90 capsule; Refill: 0        I spent 20 minutes caring for Jaron Espinoza on this date of service. This time includes time spent by me in the following activities: preparing for the visit, reviewing tests, performing a medically appropriate examination and/or evaluation , counseling and educating the patient/family/caregiver, ordering medications, tests, or procedures and documenting information in the medical record        Follow Up     Return for Next scheduled follow up or earlier if needed .  Patient was given instructions and counseling regarding his condition or for health maintenance advice. Please see specific information pulled into the AVS if appropriate.        Part of this note may be an electronic transcription/translation of spoken language to printed text using the Dragon Dictation System

## 2025-06-30 RX ORDER — CELECOXIB 200 MG/1
CAPSULE ORAL
Qty: 60 CAPSULE | Refills: 0 | Status: SHIPPED | OUTPATIENT
Start: 2025-06-30

## 2025-07-03 ENCOUNTER — CLINICAL SUPPORT (OUTPATIENT)
Dept: FAMILY MEDICINE CLINIC | Facility: CLINIC | Age: 65
End: 2025-07-03
Payer: MEDICARE

## 2025-07-03 DIAGNOSIS — E29.1 HYPOGONADISM, MALE: Primary | ICD-10-CM

## 2025-07-03 PROCEDURE — 96372 THER/PROPH/DIAG INJ SC/IM: CPT | Performed by: NURSE PRACTITIONER

## 2025-07-03 RX ADMIN — TESTOSTERONE CYPIONATE 200 MG: 200 INJECTION, SOLUTION INTRAMUSCULAR at 13:17

## 2025-07-03 NOTE — PROGRESS NOTES
Injection  Injection performed in right ventrogluteal by Megan Manjarrez MA. Patient tolerated the procedure well without complications.  07/03/25   Megan Manjarrez MA

## 2025-07-17 ENCOUNTER — CLINICAL SUPPORT (OUTPATIENT)
Dept: FAMILY MEDICINE CLINIC | Facility: CLINIC | Age: 65
End: 2025-07-17
Payer: MEDICARE

## 2025-07-17 DIAGNOSIS — R79.89 LOW TESTOSTERONE: Primary | ICD-10-CM

## 2025-07-17 PROCEDURE — 96372 THER/PROPH/DIAG INJ SC/IM: CPT | Performed by: NURSE PRACTITIONER

## 2025-07-17 RX ADMIN — TESTOSTERONE CYPIONATE 200 MG: 200 INJECTION, SOLUTION INTRAMUSCULAR at 14:44

## 2025-07-17 NOTE — PROGRESS NOTES
Injection  Injection performed in the right ventrogluteal by Janny Goodman MA. Patient tolerated the procedure well without complications.  07/17/25   Janny Goodman MA

## 2025-07-21 ENCOUNTER — OFFICE VISIT (OUTPATIENT)
Dept: FAMILY MEDICINE CLINIC | Facility: CLINIC | Age: 65
End: 2025-07-21
Payer: MEDICARE

## 2025-07-21 VITALS
WEIGHT: 197 LBS | HEART RATE: 66 BPM | OXYGEN SATURATION: 96 % | HEIGHT: 72 IN | SYSTOLIC BLOOD PRESSURE: 142 MMHG | BODY MASS INDEX: 26.68 KG/M2 | DIASTOLIC BLOOD PRESSURE: 78 MMHG

## 2025-07-21 DIAGNOSIS — G47.19 DAYTIME HYPERSOMNOLENCE: Primary | ICD-10-CM

## 2025-07-21 DIAGNOSIS — R79.89 LOW TESTOSTERONE: ICD-10-CM

## 2025-07-21 DIAGNOSIS — R53.83 LOW ENERGY: ICD-10-CM

## 2025-07-21 DIAGNOSIS — R06.83 LOUD SNORING: ICD-10-CM

## 2025-07-21 RX ORDER — CYANOCOBALAMIN 1000 UG/ML
1000 INJECTION, SOLUTION INTRAMUSCULAR; SUBCUTANEOUS ONCE
Status: COMPLETED | OUTPATIENT
Start: 2025-07-21 | End: 2025-07-21

## 2025-07-21 RX ADMIN — CYANOCOBALAMIN 1000 MCG: 1000 INJECTION, SOLUTION INTRAMUSCULAR; SUBCUTANEOUS at 17:02

## 2025-07-21 NOTE — PROGRESS NOTES
Injection  Injection performed in left deltoid by Megan Manjarrez MA. Patient tolerated the procedure well without complications.  07/21/25   Megan Manjarrez MA

## 2025-07-21 NOTE — PROGRESS NOTES
Chief Complaint  Chief Complaint   Patient presents with    Daytime Sleepiness     Especially after eating. Has had to pull over and sleep in parking lots recently. Gradually worsening X 3 weeks. Thinks it may be due to a medicine that was recently started            Subjective          Jaron Espinoza presents to [facility] for   History of Present Illness    Patient with hypogonadism on testosterone 200 mg IM every 2 weeks has been more consistently taking testosterone, he is taking vitamin D weekly as directed as well.  Here today for complaints of severe daytime sleepiness as mentioned in CC. Symptoms occur after eating a meal. He fell asleep in [place], has pulled over while driving multiple times to take a nap. Today had to cancel a job in [employer] d/t being afraid of driving. He was started on flomax approximately 4 weeks ago. He does take clonidine prn for elevated bp but does not feel sleepiness is related. He reports some improvement in nocturnal urination and frequency. He is still not sleeping well-getting 3-4 hours of sleep nightly.   -not using hydroxyzine, baclofen      The following portions of the patient's history were reviewed and updated as appropriate: allergies, current medications, past family history, past medical history, past social history, past surgical history and problem list.    Past Medical History:   Diagnosis Date    Headache     Hx of hypogonadism     Hyperlipidemia     Hypertension      Past Surgical History:   Procedure Laterality Date    EYELID RETRACTION REPAIR       Family History   Problem Relation Age of Onset    Colon cancer Mother      Social History     Tobacco Use    Smoking status: Former     Current packs/day: 0.00     Average packs/day: 0.5 packs/day for 15.0 years (7.5 ttl pk-yrs)     Types: Cigarettes     Start date:      Quit date:      Years since quittin.5     Passive exposure: Past    Smokeless tobacco: Never     "Tobacco comments:     quit x30 years   Substance Use Topics    Alcohol use: No       Current Outpatient Medications:     aspirin 81 MG chewable tablet, Chew 1 tablet Daily., Disp: 30 tablet, Rfl: 0    atorvastatin (LIPITOR) 10 MG tablet, Take 1 tablet by mouth Every Night., Disp: 90 tablet, Rfl: 1    Azelastine HCl 137 MCG/SPRAY solution, 2 sprays by Each Nare route 2 (Two) Times a Day., Disp: 30 mL, Rfl: 1    baclofen (LIORESAL) 10 MG tablet, Take 1 tablet by mouth At Night As Needed for Muscle Spasms., Disp: 30 tablet, Rfl: 0    celecoxib (CeleBREX) 200 MG capsule, TAKE 1 CAPSULE BY MOUTH 2 TIMES A DAY AS NEEDED FOR MODERATE PAIN, Disp: 60 capsule, Rfl: 0    clobetasol propionate (TEMOVATE) 0.05 % cream, , Disp: , Rfl:     cloNIDine (CATAPRES) 0.1 MG tablet, TAKE 1 TABLET BY MOUTH EVERY 6 HOURS FOR HIGH BLOOD PRESSURE, Disp: 90 tablet, Rfl: 1    hydrALAZINE (APRESOLINE) 25 MG tablet, TAKE 1 TABLET BY MOUTH 3 TIMES A DAY, Disp: 270 tablet, Rfl: 0    hydrOXYzine (ATARAX) 10 MG tablet, Take 1-2 tablets by mouth Every 6 (Six) Hours As Needed for Itching (insomnia)., Disp: 60 tablet, Rfl: 0    losartan (Cozaar) 100 MG tablet, Take 1 tablet by mouth Daily., Disp: 90 tablet, Rfl: 1    omeprazole (priLOSEC) 40 MG capsule, TAKE 1 CAPSULE BY MOUTH DAILY, Disp: 90 capsule, Rfl: 1    Syringe, Disposable, 3 ML misc, Use 1 mL Every 14 (Fourteen) Days., Disp: 2 each, Rfl: 4    Syringe/Needle, Disp, (B-D INTEGRA SYRINGE) 23G X 1\" 3 ML misc, USE TO INJECT TESTOSTERONE ONCE EVERY 14 DAYS, Disp: 2 each, Rfl: 11    tamsulosin (FLOMAX) 0.4 MG capsule 24 hr capsule, Take 1 capsule by mouth Every Night., Disp: 90 capsule, Rfl: 0    Testosterone Cypionate (DEPOTESTOTERONE CYPIONATE) 200 MG/ML injection, Inject 1 mL into the appropriate muscle as directed by prescriber Every 14 (Fourteen) Days., Disp: 6 mL, Rfl: 1    vitamin D (ERGOCALCIFEROL) 1.25 MG (30006 UT) capsule capsule, TAKE 1 CAPSULE BY MOUTH ONCE WEEKLY, Disp: 12 capsule, Rfl: " "1    Current Facility-Administered Medications:     Testosterone Cypionate (DEPOTESTOTERONE CYPIONATE) injection 200 mg, 200 mg, Intramuscular, Q14 Days, Rina Colindres, APRN, 200 mg at 07/17/25 1444    Objective   Vital Signs:   Vitals:    07/21/25 1615   BP: 142/78   BP Location: Left arm   Patient Position: Sitting   Cuff Size: Adult   Pulse: 66   SpO2: 96%   Weight: 89.4 kg (197 lb)   Height: 182.9 cm (72\")   PainSc: 0-No pain       Body mass index is 26.72 kg/m².    Physical Exam  Constitutional:       General: He is not in acute distress.     Appearance: Normal appearance. He is well-developed. He is not ill-appearing or diaphoretic.   HENT:      Head: Normocephalic.   Eyes:      Conjunctiva/sclera: Conjunctivae normal.      Pupils: Pupils are equal, round, and reactive to light.   Neck:      Thyroid: No thyromegaly.      Vascular: No JVD.   Cardiovascular:      Rate and Rhythm: Normal rate and regular rhythm.      Heart sounds: Normal heart sounds. No murmur heard.  Pulmonary:      Effort: Pulmonary effort is normal. No respiratory distress.      Breath sounds: Normal breath sounds. No wheezing or rhonchi.   Abdominal:      General: Bowel sounds are normal. There is no distension.      Palpations: Abdomen is soft.      Tenderness: There is no abdominal tenderness.   Musculoskeletal:         General: No swelling or tenderness. Normal range of motion.      Cervical back: Normal range of motion and neck supple. No tenderness.   Lymphadenopathy:      Cervical: No cervical adenopathy.   Skin:     General: Skin is warm and dry.      Coloration: Skin is not jaundiced.      Findings: No erythema or rash.   Neurological:      General: No focal deficit present.      Mental Status: He is alert and oriented to person, place, and time. Mental status is at baseline.      Sensory: No sensory deficit.      Motor: No weakness.      Gait: Gait normal.   Psychiatric:         Mood and Affect: Mood normal.         Behavior: " Behavior normal.         Thought Content: Thought content normal.         Judgment: Judgment normal.          Result Review :     No visits with results within 7 Day(s) from this visit.   Latest known visit with results is:   Office Visit on 06/23/2025   Component Date Value Ref Range Status    Color 06/23/2025 Yellow  Yellow, Straw, Dark Yellow, Judy Final    Clarity, UA 06/23/2025 Clear  Clear Final    Glucose, UA 06/23/2025 Negative  Negative mg/dL Final    Bilirubin 06/23/2025 Negative  Negative Final    Ketones, UA 06/23/2025 Negative  Negative Final    Specific Gravity  06/23/2025 1.015  1.005 - 1.030 Final    Blood, UA 06/23/2025 Trace (A)  Negative Final    pH, Urine 06/23/2025 5.5  5.0 - 8.0 Final    Protein, POC 06/23/2025 Negative  Negative mg/dL Final    Urobilinogen, UA 06/23/2025 Normal  Normal, 0.2 E.U./dL Final    Nitrite, UA 06/23/2025 Negative  Negative Final    Leukocytes 06/23/2025 Negative  Negative Final                              Assessment and Plan    Diagnoses and all orders for this visit:    1. Daytime hypersomnolence (Primary)  -     Home Sleep Study; Future    2. Loud snoring  -     Home Sleep Study; Future    3. Low testosterone  -     Home Sleep Study; Future    4. Low energy  -     Home Sleep Study; Future  -     cyanocobalamin injection 1,000 mcg      Previous labs reviewed and stable  Flomax is most recent med addition, hold for 1 week to see if s/s improve, resume if not  Cont T Q2 weeks  B12 IM today  Check home sleep study, consider CPAP if indicated  Return in 2 weeks for labs  Consider treat insomnia if HSS negative  Call if s/s persist or worsen, may consider sleep team referral      I spent 30 minutes caring for Jaron Espinoza on this date of service. This time includes time spent by me in the following activities: preparing for the visit, reviewing tests, performing a medically appropriate examination and/or evaluation , counseling and educating the  patient/family/caregiver, ordering medications, tests, or procedures and documenting information in the medical record        Follow Up     Return for Next scheduled follow up. return in 2 weeks for B12, [id number], total T and cmp, cbc first week of aug.  Patient was given instructions and counseling regarding his condition or for health maintenance advice. Please see specific information pulled into the AVS if appropriate.        Part of this note may be an electronic transcription/translation of spoken language to printed text using the Dragon Dictation System

## 2025-07-28 RX ORDER — CELECOXIB 200 MG/1
CAPSULE ORAL
Qty: 60 CAPSULE | Refills: 0 | Status: SHIPPED | OUTPATIENT
Start: 2025-07-28

## 2025-07-31 ENCOUNTER — CLINICAL SUPPORT (OUTPATIENT)
Dept: FAMILY MEDICINE CLINIC | Facility: CLINIC | Age: 65
End: 2025-07-31
Payer: MEDICARE

## 2025-07-31 DIAGNOSIS — E29.1 HYPOGONADISM, MALE: Primary | ICD-10-CM

## 2025-07-31 PROCEDURE — 96372 THER/PROPH/DIAG INJ SC/IM: CPT | Performed by: NURSE PRACTITIONER

## 2025-07-31 RX ADMIN — TESTOSTERONE CYPIONATE 200 MG: 200 INJECTION, SOLUTION INTRAMUSCULAR at 14:30

## 2025-07-31 NOTE — PROGRESS NOTES
Injection  Injection performed in left ventrogluteal by Balta Ornelas. Patient tolerated the procedure well without complications.  07/31/25   Balta Ornelas

## 2025-08-01 DIAGNOSIS — E29.1 HYPOGONADISM, MALE: Primary | ICD-10-CM

## 2025-08-01 DIAGNOSIS — R73.09 ELEVATED GLUCOSE: ICD-10-CM

## 2025-08-01 DIAGNOSIS — E55.9 VITAMIN D DEFICIENCY: ICD-10-CM

## 2025-08-01 DIAGNOSIS — R53.83 FATIGUE, UNSPECIFIED TYPE: ICD-10-CM

## 2025-08-01 DIAGNOSIS — I10 PRIMARY HYPERTENSION: ICD-10-CM

## 2025-08-11 ENCOUNTER — OFFICE VISIT (OUTPATIENT)
Dept: FAMILY MEDICINE CLINIC | Facility: CLINIC | Age: 65
End: 2025-08-11
Payer: MEDICARE

## 2025-08-11 VITALS
OXYGEN SATURATION: 96 % | HEIGHT: 72 IN | DIASTOLIC BLOOD PRESSURE: 87 MMHG | BODY MASS INDEX: 26.01 KG/M2 | HEART RATE: 67 BPM | SYSTOLIC BLOOD PRESSURE: 159 MMHG | WEIGHT: 192 LBS

## 2025-08-11 DIAGNOSIS — R53.83 FATIGUE, UNSPECIFIED TYPE: ICD-10-CM

## 2025-08-11 DIAGNOSIS — M54.2 CERVICALGIA: ICD-10-CM

## 2025-08-11 DIAGNOSIS — L29.9 PRURITUS: ICD-10-CM

## 2025-08-11 DIAGNOSIS — E29.1 HYPOGONADISM, MALE: ICD-10-CM

## 2025-08-11 DIAGNOSIS — M25.522 LEFT ELBOW PAIN: Primary | ICD-10-CM

## 2025-08-11 DIAGNOSIS — I10 PRIMARY HYPERTENSION: ICD-10-CM

## 2025-08-11 DIAGNOSIS — E53.8 B12 DEFICIENCY: ICD-10-CM

## 2025-08-11 LAB
DEPRECATED RDW RBC AUTO: 41.4 FL (ref 37–54)
ERYTHROCYTE [DISTWIDTH] IN BLOOD BY AUTOMATED COUNT: 12.3 % (ref 12.3–15.4)
HBA1C MFR BLD: 6 % (ref 4.8–5.6)
HCT VFR BLD AUTO: 52.7 % (ref 37.5–51)
HGB BLD-MCNC: 17.1 G/DL (ref 13–17.7)
MCH RBC QN AUTO: 29.7 PG (ref 26.6–33)
MCHC RBC AUTO-ENTMCNC: 32.4 G/DL (ref 31.5–35.7)
MCV RBC AUTO: 91.5 FL (ref 79–97)
PLATELET # BLD AUTO: 272 10*3/MM3 (ref 140–450)
PMV BLD AUTO: 10.3 FL (ref 6–12)
RBC # BLD AUTO: 5.76 10*6/MM3 (ref 4.14–5.8)
WBC NRBC COR # BLD AUTO: 7.5 10*3/MM3 (ref 3.4–10.8)

## 2025-08-11 PROCEDURE — 83036 HEMOGLOBIN GLYCOSYLATED A1C: CPT | Performed by: NURSE PRACTITIONER

## 2025-08-11 PROCEDURE — 82306 VITAMIN D 25 HYDROXY: CPT | Performed by: NURSE PRACTITIONER

## 2025-08-11 PROCEDURE — 3077F SYST BP >= 140 MM HG: CPT | Performed by: NURSE PRACTITIONER

## 2025-08-11 PROCEDURE — 3079F DIAST BP 80-89 MM HG: CPT | Performed by: NURSE PRACTITIONER

## 2025-08-11 PROCEDURE — 85027 COMPLETE CBC AUTOMATED: CPT | Performed by: NURSE PRACTITIONER

## 2025-08-11 PROCEDURE — 84403 ASSAY OF TOTAL TESTOSTERONE: CPT | Performed by: NURSE PRACTITIONER

## 2025-08-11 PROCEDURE — 82607 VITAMIN B-12: CPT | Performed by: NURSE PRACTITIONER

## 2025-08-11 PROCEDURE — 1159F MED LIST DOCD IN RCRD: CPT | Performed by: NURSE PRACTITIONER

## 2025-08-11 PROCEDURE — 99214 OFFICE O/P EST MOD 30 MIN: CPT | Performed by: NURSE PRACTITIONER

## 2025-08-11 PROCEDURE — 1125F AMNT PAIN NOTED PAIN PRSNT: CPT | Performed by: NURSE PRACTITIONER

## 2025-08-11 PROCEDURE — 1160F RVW MEDS BY RX/DR IN RCRD: CPT | Performed by: NURSE PRACTITIONER

## 2025-08-11 PROCEDURE — 80053 COMPREHEN METABOLIC PANEL: CPT | Performed by: NURSE PRACTITIONER

## 2025-08-11 RX ORDER — METHYLPREDNISOLONE 4 MG/1
TABLET ORAL
Qty: 21 TABLET | Refills: 0 | Status: SHIPPED | OUTPATIENT
Start: 2025-08-11

## 2025-08-11 RX ORDER — CYANOCOBALAMIN 1000 UG/ML
1000 INJECTION, SOLUTION INTRAMUSCULAR; SUBCUTANEOUS ONCE
Status: COMPLETED | OUTPATIENT
Start: 2025-08-14 | End: 2025-08-14

## 2025-08-11 RX ORDER — MELOXICAM 15 MG/1
15 TABLET ORAL DAILY PRN
Qty: 90 TABLET | Refills: 0 | Status: SHIPPED | OUTPATIENT
Start: 2025-08-11

## 2025-08-11 RX ORDER — CLONIDINE HYDROCHLORIDE 0.1 MG/1
TABLET ORAL
Qty: 90 TABLET | Refills: 1 | Status: SHIPPED | OUTPATIENT
Start: 2025-08-11

## 2025-08-12 LAB
25(OH)D3 SERPL-MCNC: 37.6 NG/ML (ref 30–100)
ALBUMIN SERPL-MCNC: 4.5 G/DL (ref 3.5–5.2)
ALBUMIN/GLOB SERPL: 1.5 G/DL
ALP SERPL-CCNC: 63 U/L (ref 39–117)
ALT SERPL W P-5'-P-CCNC: 26 U/L (ref 1–41)
ANION GAP SERPL CALCULATED.3IONS-SCNC: 11.8 MMOL/L (ref 5–15)
AST SERPL-CCNC: 26 U/L (ref 1–40)
BILIRUB SERPL-MCNC: 0.3 MG/DL (ref 0–1.2)
BUN SERPL-MCNC: 19 MG/DL (ref 8–23)
BUN/CREAT SERPL: 16.2 (ref 7–25)
CALCIUM SPEC-SCNC: 10.6 MG/DL (ref 8.6–10.5)
CHLORIDE SERPL-SCNC: 103 MMOL/L (ref 98–107)
CO2 SERPL-SCNC: 25.2 MMOL/L (ref 22–29)
CREAT SERPL-MCNC: 1.17 MG/DL (ref 0.76–1.27)
EGFRCR SERPLBLD CKD-EPI 2021: 68.8 ML/MIN/1.73
GLOBULIN UR ELPH-MCNC: 3.1 GM/DL
GLUCOSE SERPL-MCNC: 95 MG/DL (ref 65–99)
POTASSIUM SERPL-SCNC: 4.7 MMOL/L (ref 3.5–5.2)
PROT SERPL-MCNC: 7.6 G/DL (ref 6–8.5)
SODIUM SERPL-SCNC: 140 MMOL/L (ref 136–145)
TESTOST SERPL-MCNC: 221 NG/DL (ref 193–740)
VIT B12 BLD-MCNC: 817 PG/ML (ref 211–946)

## 2025-08-14 ENCOUNTER — CLINICAL SUPPORT (OUTPATIENT)
Dept: FAMILY MEDICINE CLINIC | Facility: CLINIC | Age: 65
End: 2025-08-14
Payer: MEDICARE

## 2025-08-14 DIAGNOSIS — E29.1 HYPOGONADISM, MALE: Primary | ICD-10-CM

## 2025-08-14 DIAGNOSIS — E53.8 VITAMIN B12 DEFICIENCY: ICD-10-CM

## 2025-08-14 PROCEDURE — 96372 THER/PROPH/DIAG INJ SC/IM: CPT | Performed by: NURSE PRACTITIONER

## 2025-08-14 RX ADMIN — TESTOSTERONE CYPIONATE 200 MG: 200 INJECTION, SOLUTION INTRAMUSCULAR at 11:56

## 2025-08-14 RX ADMIN — CYANOCOBALAMIN 1000 MCG: 1000 INJECTION, SOLUTION INTRAMUSCULAR; SUBCUTANEOUS at 11:55

## 2025-08-28 ENCOUNTER — CLINICAL SUPPORT (OUTPATIENT)
Dept: FAMILY MEDICINE CLINIC | Facility: CLINIC | Age: 65
End: 2025-08-28
Payer: MEDICARE

## 2025-08-28 DIAGNOSIS — R79.89 LOW TESTOSTERONE: ICD-10-CM

## 2025-08-28 DIAGNOSIS — E53.8 VITAMIN B12 DEFICIENCY: Primary | ICD-10-CM

## 2025-08-28 PROCEDURE — 96372 THER/PROPH/DIAG INJ SC/IM: CPT | Performed by: NURSE PRACTITIONER

## 2025-08-28 RX ORDER — CYANOCOBALAMIN 1000 UG/ML
1000 INJECTION, SOLUTION INTRAMUSCULAR; SUBCUTANEOUS
Status: SHIPPED | OUTPATIENT
Start: 2025-08-28

## 2025-08-28 RX ORDER — HYDRALAZINE HYDROCHLORIDE 25 MG/1
25 TABLET, FILM COATED ORAL 3 TIMES DAILY
Qty: 270 TABLET | Refills: 0 | Status: SHIPPED | OUTPATIENT
Start: 2025-08-28

## 2025-08-28 RX ADMIN — TESTOSTERONE CYPIONATE 200 MG: 200 INJECTION, SOLUTION INTRAMUSCULAR at 16:37

## 2025-08-28 RX ADMIN — CYANOCOBALAMIN 1000 MCG: 1000 INJECTION, SOLUTION INTRAMUSCULAR; SUBCUTANEOUS at 16:38
